# Patient Record
Sex: FEMALE | Race: BLACK OR AFRICAN AMERICAN | NOT HISPANIC OR LATINO | URBAN - METROPOLITAN AREA
[De-identification: names, ages, dates, MRNs, and addresses within clinical notes are randomized per-mention and may not be internally consistent; named-entity substitution may affect disease eponyms.]

---

## 2017-02-15 ENCOUNTER — EMERGENCY (EMERGENCY)
Facility: HOSPITAL | Age: 52
LOS: 1 days | Discharge: HOME | End: 2017-02-15
Attending: EMERGENCY MEDICINE | Admitting: EMERGENCY MEDICINE
Payer: MEDICAID

## 2017-02-15 VITALS
HEART RATE: 90 BPM | RESPIRATION RATE: 20 BRPM | TEMPERATURE: 99 F | HEIGHT: 61 IN | OXYGEN SATURATION: 100 % | DIASTOLIC BLOOD PRESSURE: 89 MMHG | SYSTOLIC BLOOD PRESSURE: 123 MMHG | WEIGHT: 240.08 LBS

## 2017-02-15 DIAGNOSIS — H10.9 UNSPECIFIED CONJUNCTIVITIS: ICD-10-CM

## 2017-02-15 DIAGNOSIS — H57.8 OTHER SPECIFIED DISORDERS OF EYE AND ADNEXA: ICD-10-CM

## 2017-02-15 PROCEDURE — 99283 EMERGENCY DEPT VISIT LOW MDM: CPT

## 2017-02-16 PROBLEM — Z00.00 ENCOUNTER FOR PREVENTIVE HEALTH EXAMINATION: Status: ACTIVE | Noted: 2017-02-16

## 2017-02-16 RX ORDER — POLYMYXIN B SULF/TRIMETHOPRIM 10000-1/ML
1 DROPS OPHTHALMIC (EYE)
Qty: 1 | Refills: 0
Start: 2017-02-16 | End: 2017-02-23

## 2017-02-16 RX ORDER — POLYMYXIN B SULF/TRIMETHOPRIM 10000-1/ML
1 DROPS OPHTHALMIC (EYE) ONCE
Qty: 0 | Refills: 0 | Status: COMPLETED | OUTPATIENT
Start: 2017-02-16 | End: 2017-02-16

## 2017-02-16 RX ADMIN — Medication 1 DROP(S): at 01:20

## 2017-02-17 NOTE — ED PROVIDER NOTE - OBJECTIVE STATEMENT
52 y/o female complaining of left eye irritation and discharge  that began earlier this am, admits to itchiness to right eye tonight. denies fever/Ha/blurry vision/FB sensation. Denies contact lens use.

## 2017-02-17 NOTE — ED PROVIDER NOTE - ATTENDING CONTRIBUTION TO CARE
51y old with acute eye irritation, and erythema, most likely conjunctivitis, no truma, no fb sensation, no uri, no fever, outpatient follow upwith eye suggested

## 2020-08-14 ENCOUNTER — INPATIENT (INPATIENT)
Facility: HOSPITAL | Age: 55
LOS: 2 days | Discharge: HOME | DRG: 638 | End: 2020-08-17
Attending: INTERNAL MEDICINE | Admitting: HOSPITALIST
Payer: COMMERCIAL

## 2020-08-14 VITALS
OXYGEN SATURATION: 98 % | HEART RATE: 91 BPM | TEMPERATURE: 98 F | SYSTOLIC BLOOD PRESSURE: 140 MMHG | RESPIRATION RATE: 18 BRPM | WEIGHT: 250 LBS | DIASTOLIC BLOOD PRESSURE: 85 MMHG | HEIGHT: 61 IN

## 2020-08-14 LAB
A1C WITH ESTIMATED AVERAGE GLUCOSE RESULT: 13.5 % — HIGH (ref 4–5.6)
ALBUMIN SERPL ELPH-MCNC: 4.5 G/DL — SIGNIFICANT CHANGE UP (ref 3.3–5.2)
ALP SERPL-CCNC: 113 U/L — SIGNIFICANT CHANGE UP (ref 40–120)
ALT FLD-CCNC: 31 U/L — SIGNIFICANT CHANGE UP
ANION GAP SERPL CALC-SCNC: 20 MMOL/L — HIGH (ref 5–17)
APPEARANCE UR: ABNORMAL
AST SERPL-CCNC: 20 U/L — SIGNIFICANT CHANGE UP
BACTERIA # UR AUTO: ABNORMAL
BASE EXCESS BLDV CALC-SCNC: -2.6 MMOL/L — LOW (ref -2–2)
BASOPHILS # BLD AUTO: 0.05 K/UL — SIGNIFICANT CHANGE UP (ref 0–0.2)
BASOPHILS NFR BLD AUTO: 0.7 % — SIGNIFICANT CHANGE UP (ref 0–2)
BILIRUB SERPL-MCNC: 0.6 MG/DL — SIGNIFICANT CHANGE UP (ref 0.4–2)
BILIRUB UR-MCNC: NEGATIVE — SIGNIFICANT CHANGE UP
BUN SERPL-MCNC: 9 MG/DL — SIGNIFICANT CHANGE UP (ref 8–20)
CA-I SERPL-SCNC: 1.1 MMOL/L — LOW (ref 1.15–1.33)
CALCIUM SERPL-MCNC: 9.9 MG/DL — SIGNIFICANT CHANGE UP (ref 8.6–10.2)
CHLORIDE BLDV-SCNC: 93 MMOL/L — LOW (ref 98–107)
CHLORIDE SERPL-SCNC: 87 MMOL/L — LOW (ref 98–107)
CO2 SERPL-SCNC: 22 MMOL/L — SIGNIFICANT CHANGE UP (ref 22–29)
COLOR SPEC: YELLOW — SIGNIFICANT CHANGE UP
COMMENT - URINE: SIGNIFICANT CHANGE UP
CREAT SERPL-MCNC: 1.01 MG/DL — SIGNIFICANT CHANGE UP (ref 0.5–1.3)
DIFF PNL FLD: ABNORMAL
EOSINOPHIL # BLD AUTO: 0.11 K/UL — SIGNIFICANT CHANGE UP (ref 0–0.5)
EOSINOPHIL NFR BLD AUTO: 1.5 % — SIGNIFICANT CHANGE UP (ref 0–6)
EPI CELLS # UR: SIGNIFICANT CHANGE UP
ESTIMATED AVERAGE GLUCOSE: 341 MG/DL — HIGH (ref 68–114)
GAS PNL BLDV: 133 MMOL/L — LOW (ref 135–145)
GAS PNL BLDV: SIGNIFICANT CHANGE UP
GAS PNL BLDV: SIGNIFICANT CHANGE UP
GLUCOSE BLDV-MCNC: 627 MG/DL — CRITICAL HIGH (ref 70–99)
GLUCOSE SERPL-MCNC: 633 MG/DL — CRITICAL HIGH (ref 70–99)
GLUCOSE UR QL: 1000 MG/DL
HCO3 BLDV-SCNC: 22 MMOL/L — SIGNIFICANT CHANGE UP (ref 20–26)
HCT VFR BLD CALC: 43.6 % — SIGNIFICANT CHANGE UP (ref 34.5–45)
HCT VFR BLDA CALC: 47 — SIGNIFICANT CHANGE UP (ref 39–50)
HGB BLD CALC-MCNC: 15.2 G/DL — SIGNIFICANT CHANGE UP (ref 11.5–15.5)
HGB BLD-MCNC: 14.4 G/DL — SIGNIFICANT CHANGE UP (ref 11.5–15.5)
IMM GRANULOCYTES NFR BLD AUTO: 0.1 % — SIGNIFICANT CHANGE UP (ref 0–1.5)
KETONES UR-MCNC: ABNORMAL
LACTATE BLDV-MCNC: 2.6 MMOL/L — HIGH (ref 0.5–2)
LEUKOCYTE ESTERASE UR-ACNC: ABNORMAL
LYMPHOCYTES # BLD AUTO: 2.67 K/UL — SIGNIFICANT CHANGE UP (ref 1–3.3)
LYMPHOCYTES # BLD AUTO: 37.4 % — SIGNIFICANT CHANGE UP (ref 13–44)
MCHC RBC-ENTMCNC: 30.3 PG — SIGNIFICANT CHANGE UP (ref 27–34)
MCHC RBC-ENTMCNC: 33 GM/DL — SIGNIFICANT CHANGE UP (ref 32–36)
MCV RBC AUTO: 91.8 FL — SIGNIFICANT CHANGE UP (ref 80–100)
MONOCYTES # BLD AUTO: 0.62 K/UL — SIGNIFICANT CHANGE UP (ref 0–0.9)
MONOCYTES NFR BLD AUTO: 8.7 % — SIGNIFICANT CHANGE UP (ref 2–14)
NEUTROPHILS # BLD AUTO: 3.68 K/UL — SIGNIFICANT CHANGE UP (ref 1.8–7.4)
NEUTROPHILS NFR BLD AUTO: 51.6 % — SIGNIFICANT CHANGE UP (ref 43–77)
NITRITE UR-MCNC: NEGATIVE — SIGNIFICANT CHANGE UP
OTHER CELLS CSF MANUAL: 15 ML/DL — LOW (ref 18–22)
PCO2 BLDV: 50 MMHG — SIGNIFICANT CHANGE UP (ref 35–50)
PH BLDV: 7.3 — LOW (ref 7.32–7.43)
PH UR: 6 — SIGNIFICANT CHANGE UP (ref 5–8)
PLATELET # BLD AUTO: 280 K/UL — SIGNIFICANT CHANGE UP (ref 150–400)
PO2 BLDV: 39 MMHG — SIGNIFICANT CHANGE UP (ref 25–45)
POTASSIUM BLDV-SCNC: 4.4 MMOL/L — SIGNIFICANT CHANGE UP (ref 3.4–4.5)
POTASSIUM SERPL-MCNC: 3.9 MMOL/L — SIGNIFICANT CHANGE UP (ref 3.5–5.3)
POTASSIUM SERPL-SCNC: 3.9 MMOL/L — SIGNIFICANT CHANGE UP (ref 3.5–5.3)
PROT SERPL-MCNC: 7.7 G/DL — SIGNIFICANT CHANGE UP (ref 6.6–8.7)
PROT UR-MCNC: 30 MG/DL
RBC # BLD: 4.75 M/UL — SIGNIFICANT CHANGE UP (ref 3.8–5.2)
RBC # FLD: 13.2 % — SIGNIFICANT CHANGE UP (ref 10.3–14.5)
RBC CASTS # UR COMP ASSIST: ABNORMAL /HPF (ref 0–4)
SAO2 % BLDV: 70 % — SIGNIFICANT CHANGE UP
SODIUM SERPL-SCNC: 129 MMOL/L — LOW (ref 135–145)
SP GR SPEC: 1.01 — SIGNIFICANT CHANGE UP (ref 1.01–1.02)
UROBILINOGEN FLD QL: NEGATIVE MG/DL — SIGNIFICANT CHANGE UP
WBC # BLD: 7.14 K/UL — SIGNIFICANT CHANGE UP (ref 3.8–10.5)
WBC # FLD AUTO: 7.14 K/UL — SIGNIFICANT CHANGE UP (ref 3.8–10.5)
WBC UR QL: ABNORMAL

## 2020-08-14 PROCEDURE — 99285 EMERGENCY DEPT VISIT HI MDM: CPT

## 2020-08-14 RX ORDER — FLUCONAZOLE 150 MG/1
150 TABLET ORAL ONCE
Refills: 0 | Status: COMPLETED | OUTPATIENT
Start: 2020-08-14 | End: 2020-08-14

## 2020-08-14 RX ORDER — INSULIN LISPRO 100/ML
15 VIAL (ML) SUBCUTANEOUS ONCE
Refills: 0 | Status: COMPLETED | OUTPATIENT
Start: 2020-08-14 | End: 2020-08-14

## 2020-08-14 RX ORDER — SODIUM CHLORIDE 9 MG/ML
1000 INJECTION, SOLUTION INTRAVENOUS ONCE
Refills: 0 | Status: COMPLETED | OUTPATIENT
Start: 2020-08-14 | End: 2020-08-14

## 2020-08-14 RX ADMIN — SODIUM CHLORIDE 1000 MILLILITER(S): 9 INJECTION, SOLUTION INTRAVENOUS at 21:58

## 2020-08-14 RX ADMIN — Medication 15 UNIT(S): at 23:02

## 2020-08-14 RX ADMIN — SODIUM CHLORIDE 1000 MILLILITER(S): 9 INJECTION, SOLUTION INTRAVENOUS at 23:00

## 2020-08-14 RX ADMIN — FLUCONAZOLE 150 MILLIGRAM(S): 150 TABLET ORAL at 22:13

## 2020-08-14 RX ADMIN — SODIUM CHLORIDE 1000 MILLILITER(S): 9 INJECTION, SOLUTION INTRAVENOUS at 23:01

## 2020-08-14 NOTE — ED ADULT NURSE NOTE - CHIEF COMPLAINT QUOTE
patient sent from  for "HI" glucose level, states feeling tired UA levels off denies N/V/HA/CP, states going to bathroom every hour. accucheck in ER HIGH

## 2020-08-14 NOTE — ED PROVIDER NOTE - PROGRESS NOTE DETAILS
Angelo: I rechecked the patient and updated her on plan of care. Angelo: I received lab critical result from patient's RN. Glucose 627 and lactate 2.4. IVF are running. UA with moderate ketones, 1000+ glucose. Awaiting VBG. Angelo: I received lab critical result from patient's RN. lactate 2.4. IVF are running. UA with moderate ketones, 1000+ glucose. Awaiting VBG. Angelo: I received lab critical result. glucose 633. Angelo. I rechecked the patient. She was updated on of care. Humalog 15 IV and 2nd liter of LR ordered. Labs (BMP, Mg, P, VBG) will be rechecked at 0230. Angelo: I rechecked the patient. She was updated on plan of care. Has received 2 units of LR now. Lab recheck at 0230. Angelo: I rechecked the patient. She feels like she is voiding less frequently. Last BG of 430. Angelo: I rechecked the patient and updated her on plan for COVID test + admission.

## 2020-08-14 NOTE — ED PROVIDER NOTE - OBJECTIVE STATEMENT
Ms. Isidro is a 54 year old female with history of obesity who presents with urinary frequency. For the last 1 week the patient has had urinary frequency. She voids once every 2 hours during the day and once every hour at night. She reports weight loss and polydipsia. Also notes "a little" blood in her urine (states this is chronic issue for her), new thick white vaginal discharge, and new vaginal pruritis. Presented to Urgent Care today where glucose was noted to be over 500 and was instructed to come to ED. Of note patient used an OTC diuretic for weight loss/bloating for most of July as well as laso tea from about 7/21-8/7. Denies fevers, chills, chest pain, shortness of breath, chest pain, abdominal pain, leg swelling, rashes.  Family hx significant for mother, sister, and brother all with DM2.

## 2020-08-14 NOTE — ED PROVIDER NOTE - NS ED ROS FT
Constitutional: +polydipsia, +weight loss, no fever, sweats, and no chills.  Eyes: no pain, no redness, and no visual changes.  ENMT: no ear pain and no hearing problems, no nasal congestion/drainage, no dysphagia, and no throat pain, no neck pain, no stiffness  CV: no chest pain, no edema.  Resp: no cough, no dyspnea  GI: no abdominal pain, no bloating, no constipation, no diarrhea, no nausea and no vomiting.  : +frequency, no dysuria, +hematuria, +increased vaginal discharge, +vaginal pruritis   MSK: no msk pain, no weakness  Skin: no lesions, and no rashes.  Neuro: no LOC, no headache, no sensory deficits, and no weakness.

## 2020-08-14 NOTE — ED ADULT NURSE NOTE - ED STAT RN HANDOFF DETAILS
Pt handed off to RN NG in stable condition. Pt A+Ox 4. No apparent distress noted at this time. Receiving RN without any questions or concerns at time of handoff.

## 2020-08-14 NOTE — ED ADULT TRIAGE NOTE - CHIEF COMPLAINT QUOTE
patient sent from  for "HI" glucose level, states feeling tired UA levels off denies N/V/HA/CP, states going to bathroom every hour patient sent from  for "HI" glucose level, states feeling tired UA levels off denies N/V/HA/CP, states going to bathroom every hour. accucheck in ER HIGH

## 2020-08-14 NOTE — ED PROVIDER NOTE - CLINICAL SUMMARY MEDICAL DECISION MAKING FREE TEXT BOX
Ms. Isidro is a 54 year old female with history of obesity who presents with urinary frequency, weight loss, and polydipsia. Labs showed glucose of 633, anion gap of 20, and A1C of 13.5. UA with moderate ketones and glycosuria. Also reported symptoms consistent with vulvovaginal candidiasis, given fluconazole 150 mg PO x1. Patient given 2 liters of LR and humalog 15 units IV with improvement in gap to 17. She will be admitted to medicine for further care and education on diabetes management.

## 2020-08-14 NOTE — ED PROVIDER NOTE - ATTENDING CONTRIBUTION TO CARE
Pt new onset diabetic with A1c >13, symptomatic hyperglycemia with mild ketosis, polyuria, polydipsia. Mild elevation of AG closed with single dose of humalog, FS downtrending to 200s. Given high A1c will likely need continued insulin treatment. Admitted to medicine for glycemic control and medical optimization.

## 2020-08-14 NOTE — ED PROVIDER NOTE - PHYSICAL EXAMINATION
General: well appearing, NAD  Head:  NC, AT  Eyes: EOMI,  no scleral icterus  Ears: no erythema/drainage  Nose: midline, no bleeding/drainage  Throat: MMM  Cardiac: RRR, no m/r/g, no lower extremity edema  Respiratory: CTA anteriorly, no wheezes/rales/rhonchi, equal chest wall expansions  Abdomen: soft, obese, ND, NT, no rebound tenderness, no guarding, nonperitonitic  MSK/Vascular: full ROM, distal pulses intact, soft compartments, warm extremities  Neuro: AAOx3, strength 5/5, sensation to light touch intact  Psych: calm, cooperative, normal affect

## 2020-08-14 NOTE — ED ADULT NURSE NOTE - OBJECTIVE STATEMENT
Pt went to urgent care today for urinary frequency. Pt sent to ED for high blood sugar. On arrival BG >530.

## 2020-08-15 DIAGNOSIS — Z98.890 OTHER SPECIFIED POSTPROCEDURAL STATES: Chronic | ICD-10-CM

## 2020-08-15 DIAGNOSIS — E11.10 TYPE 2 DIABETES MELLITUS WITH KETOACIDOSIS WITHOUT COMA: ICD-10-CM

## 2020-08-15 LAB
ANION GAP SERPL CALC-SCNC: 16 MMOL/L — SIGNIFICANT CHANGE UP (ref 5–17)
ANION GAP SERPL CALC-SCNC: 17 MMOL/L — SIGNIFICANT CHANGE UP (ref 5–17)
BASE EXCESS BLDV CALC-SCNC: -1.3 MMOL/L — SIGNIFICANT CHANGE UP (ref -2–2)
BUN SERPL-MCNC: 6 MG/DL — LOW (ref 8–20)
BUN SERPL-MCNC: 7 MG/DL — LOW (ref 8–20)
CA-I SERPL-SCNC: 1.13 MMOL/L — LOW (ref 1.15–1.33)
CALCIUM SERPL-MCNC: 9.2 MG/DL — SIGNIFICANT CHANGE UP (ref 8.6–10.2)
CALCIUM SERPL-MCNC: 9.3 MG/DL — SIGNIFICANT CHANGE UP (ref 8.6–10.2)
CHLORIDE BLDV-SCNC: 99 MMOL/L — SIGNIFICANT CHANGE UP (ref 98–107)
CHLORIDE SERPL-SCNC: 93 MMOL/L — LOW (ref 98–107)
CHLORIDE SERPL-SCNC: 95 MMOL/L — LOW (ref 98–107)
CO2 SERPL-SCNC: 21 MMOL/L — LOW (ref 22–29)
CO2 SERPL-SCNC: 25 MMOL/L — SIGNIFICANT CHANGE UP (ref 22–29)
CREAT SERPL-MCNC: 0.81 MG/DL — SIGNIFICANT CHANGE UP (ref 0.5–1.3)
CREAT SERPL-MCNC: 0.84 MG/DL — SIGNIFICANT CHANGE UP (ref 0.5–1.3)
GAS PNL BLDV: 137 MMOL/L — SIGNIFICANT CHANGE UP (ref 135–145)
GAS PNL BLDV: SIGNIFICANT CHANGE UP
GAS PNL BLDV: SIGNIFICANT CHANGE UP
GLUCOSE BLDC GLUCOMTR-MCNC: 202 MG/DL — HIGH (ref 70–99)
GLUCOSE BLDC GLUCOMTR-MCNC: 268 MG/DL — HIGH (ref 70–99)
GLUCOSE BLDC GLUCOMTR-MCNC: 273 MG/DL — HIGH (ref 70–99)
GLUCOSE BLDC GLUCOMTR-MCNC: 273 MG/DL — HIGH (ref 70–99)
GLUCOSE BLDC GLUCOMTR-MCNC: 300 MG/DL — HIGH (ref 70–99)
GLUCOSE BLDV-MCNC: 365 MG/DL — HIGH (ref 70–99)
GLUCOSE SERPL-MCNC: 281 MG/DL — HIGH (ref 70–99)
GLUCOSE SERPL-MCNC: 362 MG/DL — HIGH (ref 70–99)
HCO3 BLDV-SCNC: 23 MMOL/L — SIGNIFICANT CHANGE UP (ref 20–26)
HCT VFR BLDA CALC: 45 — SIGNIFICANT CHANGE UP (ref 39–50)
HGB BLD CALC-MCNC: 14.8 G/DL — SIGNIFICANT CHANGE UP (ref 11.5–15.5)
LACTATE BLDV-MCNC: 2 MMOL/L — SIGNIFICANT CHANGE UP (ref 0.5–2)
MAGNESIUM SERPL-MCNC: 1.8 MG/DL — SIGNIFICANT CHANGE UP (ref 1.6–2.6)
OTHER CELLS CSF MANUAL: 15 ML/DL — LOW (ref 18–22)
PCO2 BLDV: 47 MMHG — SIGNIFICANT CHANGE UP (ref 35–50)
PH BLDV: 7.34 — SIGNIFICANT CHANGE UP (ref 7.32–7.43)
PHOSPHATE SERPL-MCNC: 2.5 MG/DL — SIGNIFICANT CHANGE UP (ref 2.4–4.7)
PO2 BLDV: 40 MMHG — SIGNIFICANT CHANGE UP (ref 25–45)
POTASSIUM BLDV-SCNC: 3.5 MMOL/L — SIGNIFICANT CHANGE UP (ref 3.4–4.5)
POTASSIUM SERPL-MCNC: 3.3 MMOL/L — LOW (ref 3.5–5.3)
POTASSIUM SERPL-MCNC: 3.5 MMOL/L — SIGNIFICANT CHANGE UP (ref 3.5–5.3)
POTASSIUM SERPL-SCNC: 3.3 MMOL/L — LOW (ref 3.5–5.3)
POTASSIUM SERPL-SCNC: 3.5 MMOL/L — SIGNIFICANT CHANGE UP (ref 3.5–5.3)
SAO2 % BLDV: 74 % — SIGNIFICANT CHANGE UP
SARS-COV-2 RNA SPEC QL NAA+PROBE: SIGNIFICANT CHANGE UP
SODIUM SERPL-SCNC: 131 MMOL/L — LOW (ref 135–145)
SODIUM SERPL-SCNC: 136 MMOL/L — SIGNIFICANT CHANGE UP (ref 135–145)

## 2020-08-15 PROCEDURE — 99223 1ST HOSP IP/OBS HIGH 75: CPT

## 2020-08-15 PROCEDURE — 99222 1ST HOSP IP/OBS MODERATE 55: CPT

## 2020-08-15 PROCEDURE — 12345: CPT | Mod: NC

## 2020-08-15 RX ORDER — INSULIN LISPRO 100/ML
VIAL (ML) SUBCUTANEOUS
Refills: 0 | Status: DISCONTINUED | OUTPATIENT
Start: 2020-08-15 | End: 2020-08-17

## 2020-08-15 RX ORDER — INSULIN LISPRO 100/ML
VIAL (ML) SUBCUTANEOUS AT BEDTIME
Refills: 0 | Status: DISCONTINUED | OUTPATIENT
Start: 2020-08-15 | End: 2020-08-17

## 2020-08-15 RX ORDER — ACETAMINOPHEN 500 MG
650 TABLET ORAL ONCE
Refills: 0 | Status: COMPLETED | OUTPATIENT
Start: 2020-08-15 | End: 2020-08-15

## 2020-08-15 RX ORDER — ENOXAPARIN SODIUM 100 MG/ML
40 INJECTION SUBCUTANEOUS DAILY
Refills: 0 | Status: DISCONTINUED | OUTPATIENT
Start: 2020-08-15 | End: 2020-08-17

## 2020-08-15 RX ORDER — SODIUM CHLORIDE 9 MG/ML
1000 INJECTION, SOLUTION INTRAVENOUS
Refills: 0 | Status: DISCONTINUED | OUTPATIENT
Start: 2020-08-15 | End: 2020-08-16

## 2020-08-15 RX ORDER — DEXTROSE 50 % IN WATER 50 %
15 SYRINGE (ML) INTRAVENOUS ONCE
Refills: 0 | Status: DISCONTINUED | OUTPATIENT
Start: 2020-08-15 | End: 2020-08-17

## 2020-08-15 RX ORDER — DEXTROSE 50 % IN WATER 50 %
12.5 SYRINGE (ML) INTRAVENOUS ONCE
Refills: 0 | Status: DISCONTINUED | OUTPATIENT
Start: 2020-08-15 | End: 2020-08-17

## 2020-08-15 RX ORDER — INSULIN GLARGINE 100 [IU]/ML
20 INJECTION, SOLUTION SUBCUTANEOUS EVERY MORNING
Refills: 0 | Status: DISCONTINUED | OUTPATIENT
Start: 2020-08-15 | End: 2020-08-16

## 2020-08-15 RX ORDER — DEXTROSE 50 % IN WATER 50 %
25 SYRINGE (ML) INTRAVENOUS ONCE
Refills: 0 | Status: DISCONTINUED | OUTPATIENT
Start: 2020-08-15 | End: 2020-08-17

## 2020-08-15 RX ORDER — GLUCAGON INJECTION, SOLUTION 0.5 MG/.1ML
1 INJECTION, SOLUTION SUBCUTANEOUS ONCE
Refills: 0 | Status: DISCONTINUED | OUTPATIENT
Start: 2020-08-15 | End: 2020-08-17

## 2020-08-15 RX ORDER — NITROFURANTOIN MACROCRYSTAL 50 MG
100 CAPSULE ORAL
Refills: 0 | Status: DISCONTINUED | OUTPATIENT
Start: 2020-08-15 | End: 2020-08-17

## 2020-08-15 RX ORDER — SODIUM CHLORIDE 9 MG/ML
1000 INJECTION, SOLUTION INTRAVENOUS
Refills: 0 | Status: DISCONTINUED | OUTPATIENT
Start: 2020-08-15 | End: 2020-08-17

## 2020-08-15 RX ADMIN — Medication 100 MILLIGRAM(S): at 16:34

## 2020-08-15 RX ADMIN — SODIUM CHLORIDE 100 MILLILITER(S): 9 INJECTION, SOLUTION INTRAVENOUS at 20:32

## 2020-08-15 RX ADMIN — Medication 1: at 21:17

## 2020-08-15 RX ADMIN — SODIUM CHLORIDE 1000 MILLILITER(S): 9 INJECTION, SOLUTION INTRAVENOUS at 00:00

## 2020-08-15 RX ADMIN — Medication 650 MILLIGRAM(S): at 21:59

## 2020-08-15 RX ADMIN — ENOXAPARIN SODIUM 40 MILLIGRAM(S): 100 INJECTION SUBCUTANEOUS at 16:32

## 2020-08-15 RX ADMIN — SODIUM CHLORIDE 100 MILLILITER(S): 9 INJECTION, SOLUTION INTRAVENOUS at 08:41

## 2020-08-15 RX ADMIN — Medication 4: at 16:29

## 2020-08-15 RX ADMIN — Medication 6: at 08:41

## 2020-08-15 RX ADMIN — Medication 100 MILLIGRAM(S): at 04:57

## 2020-08-15 RX ADMIN — INSULIN GLARGINE 20 UNIT(S): 100 INJECTION, SOLUTION SUBCUTANEOUS at 08:42

## 2020-08-15 RX ADMIN — Medication 6: at 11:54

## 2020-08-15 NOTE — CONSULT NOTE ADULT - SUBJECTIVE AND OBJECTIVE BOX
HPI:  54yoF hx obesity but no other PMHx who presents to [facility] ED after being sent from urgent care for hyperglycemia.  Pt reports 1 week hx of polyuria every 1-2 hours, associated with dysuria and mild hematuria.  She has been drinking water more than usual and lost about 20 lbs in teofilo last 3 mos.  She also had increased vaginal discharge associated with pruritus.  She went to urgent care due to above symptoms, her labs were checked which showed glucose >500 and pt was advised to go to ER. Denies any prior diagnosis of DM.  Reports hx of DM in multiple family members including mother, brother and sister.  On admission glucose 633, AG of 20, HgbA1c 13, pH 7.30 on VBG. UA positive.  In ED, received Humalog 15U x 1 with improvement of glucose to 362, resolution of AG and normalization of pH on VBG.     PAST MEDICAL & SURGICAL HISTORY:  Obesity  H/O knee surgery    FAMILY HISTORY:  Family history of diabetes mellitus: mother, brother, sister    SOCIAL HISTORY: no tobacco, no etoh, no drugs, 5 children     REVIEW OF SYSTEMS:  Constitutional: No fever, no chills, lost 20 lbs   Eyes: No eye swelling,no  blurry vision, no redness, no loss of vision.  Neck: No neck pain, no change in voice.  Lungs: No shortness of breath, no wheezing, no cough  CV: No chest pain, no palpitations  GI: No nausea, no vomiting, no constipation, no diarrhea, no abdominal pain  : polyuria, hematuria, dysuria   Musculoskeletal: No muscle pain, no joint pain, no swelling.  Skin: No rash, no infections.  Neurologic: No headaches, no weakness, no burning or pain in feet, no tremor.  Endocrine: No heat intolerance, no cold intolerance  Psych: No depression, no anxiety    MEDICATIONS  (STANDING):  dextrose 5%. 1000 milliLiter(s) (50 mL/Hr) IV Continuous <Continuous>  dextrose 50% Injectable 12.5 Gram(s) IV Push once  dextrose 50% Injectable 25 Gram(s) IV Push once  dextrose 50% Injectable 25 Gram(s) IV Push once  enoxaparin Injectable 40 milliGRAM(s) SubCutaneous daily  insulin glargine Injectable (LANTUS) 20 Unit(s) SubCutaneous every morning  insulin lispro (HumaLOG) corrective regimen sliding scale   SubCutaneous three times a day before meals  insulin lispro (HumaLOG) corrective regimen sliding scale   SubCutaneous at bedtime  lactated ringers. 1000 milliLiter(s) (100 mL/Hr) IV Continuous <Continuous>  nitrofurantoin monohydrate/macrocrystals (MACROBID) 100 milliGRAM(s) Oral two times a day with meals    MEDICATIONS  (PRN):  dextrose 40% Gel 15 Gram(s) Oral once PRN Blood Glucose LESS THAN 70 milliGRAM(s)/deciliter  glucagon  Injectable 1 milliGRAM(s) IntraMuscular once PRN Glucose LESS THAN 70 milligrams/deciliter    Allergies  No Known Allergies    CAPILLARY BLOOD GLUCOSE  POCT Blood Glucose.: 268 mg/dL (15 Aug 2020 11:51)      PHYSICAL EXAM:    Vital Signs Last 24 Hrs  T(C): 36.6 (15 Aug 2020 08:46), Max: 37 (15 Aug 2020 05:45)  T(F): 97.8 (15 Aug 2020 08:46), Max: 98.6 (15 Aug 2020 05:45)  HR: 72 (15 Aug 2020 08:46) (72 - 91)  BP: 116/83 (15 Aug 2020 08:46) (116/83 - 140/85)  BP(mean): --  RR: 18 (15 Aug 2020 08:46) (18 - 18)  SpO2: 98% (15 Aug 2020 08:46) (96% - 98%)    General appearance: Well developed, well nourished,obese   Eyes: Pupils equal and reactive to light. EOM full. No exophthalmos.  Neck: Trachea midline. No thyroid enlargement.  Lungs: Normal respiratory excursion. Lungs clear.  CV: Regular cardiac rhythm. No murmur. Carotid and pedal pulses intact.  Abdomen: Soft, non tender, no organomegaly or mass.  Musculoskeletal: No cyanosis, clubbing, or edema. No pedal lesions.  Skin: Warm and moist. No rash.   Neuro: Cranial nerves intact. Normal motor and sensory function.   Psych: Normal affect, good judgement.    LABS:                        14.4   7.14  )-----------( 280      ( 14 Aug 2020 22:03 )             43.6     08-15    131<L>  |  93<L>  |  7.0<L>  ----------------------------<  362<H>  3.5   |  21.0<L>  |  0.84    Ca    9.3      15 Aug 2020 02:16  Phos  2.5     08-15  Mg     1.8     08-15    TPro  7.7  /  Alb  4.5  /  TBili  0.6  /  DBili  x   /  AST  20  /  ALT  31  /  AlkPhos  113  08-14    Urinalysis Basic - ( 14 Aug 2020 22:04 )    Color: Yellow / Appearance: Slightly Turbid / S.010 / pH: x  Gluc: x / Ketone: Moderate  / Bili: Negative / Urobili: Negative mg/dL   Blood: x / Protein: 30 mg/dL / Nitrite: Negative   Leuk Esterase: Moderate / RBC: 11-25 /HPF / WBC 11-25   Sq Epi: x / Non Sq Epi: Few / Bacteria: Occasional      LIVER FUNCTIONS - ( 14 Aug 2020 22:03 )  Alb: 4.5 g/dL / Pro: 7.7 g/dL / ALK PHOS: 113 U/L / ALT: 31 U/L / AST: 20 U/L / GGT: x

## 2020-08-15 NOTE — CONSULT NOTE ADULT - ASSESSMENT
54yoF hx obesity, strong FHx of DM in multiple family members, who presented to urgent care with 1 week hx of urinary frequency, dysuria, hematuria with vaginal discharge and pruritis and was sent to ED for having glucose >500; on admission, pt with serum glucose 633, AG 20, HgbA1c 13, being admitted for new onset diabetes mellitus    New onset, uncontrolled diabetes mellitus with hyperglycemia and mild DKA   - gap closed, bicarbonate still low.   - HgbA1c 13  - continue iv fluid generously   - started insulin based on 0.2units/kg   - she got lantus 20 u x 1 today   - monitor Bgs q 2 hr   - use SSI moderate intensity sliding scale  - trend cmp q 4 hr to ensure AG remains closed and bicarbonate recovered.   - she will need CDE   - she needs to followup with endocrine as outpatient and continue CDE followups after discharge   - she will need insulin upon discharge basal and bolus TID for meals.     Hyponatremia  -Pseudohyponatremia from hyperglycemia.Will correct with fluid replenished . TRend CMP     Acute cystitis: started on macrobid  x 5 days    Vaginitis   -Pt with likely candida vulvo-vaginitis from uncontrolled DM  -Received dilfucan x 1 in ED

## 2020-08-15 NOTE — H&P ADULT - ATTENDING COMMENTS
Pt with several questions about new diagnosis and management, explained new uncontrolled DM2 which requires insulin at this stage, also explained about cardiovascular, neurological, renal, ophthalmological associated complications and need for close outpatient follow up with PMD, endocrinologist and eye doctor.

## 2020-08-15 NOTE — H&P ADULT - NSHPLABSRESULTS_GEN_ALL_CORE
14.4   7.14  )-----------( 280      ( 14 Aug 2020 22:03 )             43.6       08-15    131<L>  |  93<L>  |  7.0<L>  ----------------------------<  362<H>  3.5   |  21.0<L>  |  0.84    Ca    9.3      15 Aug 2020 02:16  Phos  2.5     08-15  Mg     1.8     08-15    TPro  7.7  /  Alb  4.5  /  TBili  0.6  /  DBili  x   /  AST  20  /  ALT  31  /  AlkPhos  113  08-14

## 2020-08-15 NOTE — H&P ADULT - NSHPPHYSICALEXAM_GEN_ALL_CORE
Vital Signs Last 24 Hrs  T(C): 36.9 (15 Aug 2020 02:13), Max: 36.9 (15 Aug 2020 02:13)  T(F): 98.5 (15 Aug 2020 02:13), Max: 98.5 (15 Aug 2020 02:13)  HR: 86 (15 Aug 2020 02:13) (86 - 91)  BP: 138/78 (15 Aug 2020 02:13) (138/78 - 140/85)  BP(mean): --  RR: 18 (15 Aug 2020 02:13) (18 - 18)  SpO2: 96% (15 Aug 2020 02:13) (96% - 98%)    GENERAL:  Well-appearing obese, not in acute distress  EYES:  Clear conjunctiva, extraocular movement intact  ENT: Moist mucous membranes, no thrush   RESP:  Non-labored breathing pattern, lungs clear to ausculation in anterior fields  CV: Regular rate and rhythm, no murmurs appreciated, no lower extremity edema  GI: Soft, non-tender, non-distended  NEURO: Awake, alert, conversant, upper and lower extremity strength 5/5, light touch sensation grossly intact  PSYCH: Calm, cooperative  SKIN: No rash or lesions, warm and dry

## 2020-08-15 NOTE — H&P ADULT - HISTORY OF PRESENT ILLNESS
54yoF hx obesity but no other PMHx who presents to [facility] ED after being sent from urgent care for hyperglycemia.  Pt reports 1 week hx of urinary frequency with voiding almost every 1-2 hours, associated with dysuria and mild hematuria.  She has been drinking water more than usual as well but thought she was dehydrated as she had recently been using supplements (Xpel diuretic and then Iaso tea) for weight loss from June of this year up until about 2 weeks.  She also had increased vaginal discharge associated with pruritus.  She went to urgent care due to above symptoms, her labs were checked which showed glucose >500 and pt was advised to go to ER.   Pt has a PMD, reports having physical last August in which she had ‘normal labs’.  Denies any prior diagnosis of DM.  Reports hx of DM in multiple family members including mother, brother and sister.  On admission, FS >530, labs notable for serums glucose 633, AG of 20, HgbA1c 13, pH 7.30 on VBG. UA positive.  In ED, received Humalog 15U x 1 with improvement of glucose to 362, resolution of AG and normalization of pH on VBG.

## 2020-08-15 NOTE — H&P ADULT - ASSESSMENT
54yoF hx obesity, strong FHx of DM in multiple family members, who presented to urgent care with 1 week hx of urinary frequency, dysuria, hematuria with vaginal discharge and pruritis and was sent to ED for having glucose >500; on admission, pt with serum glucose 633, AG 20, HgbA1c 13, being admitted for new onset diabetes mellitus    New onset, uncontrolled diabetes mellitus with hyperglycemia  -s/p Humalog 15 U in ED with improvement in glucose  -Will start insulin based on 0.2units/kg which equals about 22.6U, will start lantus 20U for now  -Moderate intensity sliding scale  -Endocrine consult  -Pt inquiring about 'specific meal plans’ for weight loss, nutrition consulted  -Pt will likely require insulin at time of discharge and will need diabetes teaching    Acute cystitis  -Pt with urinary symptoms, +UA, no systemic signs of infection  -Start macrobid x 5 days    Vaginitis   -Pt with likely candida vulvo-vaginitis from uncontrolled DM  -Received dilfucan x 1 in ED    Hyponatremia  -Pseudohyponatremia from hyperglycemia    Prophylactic measure  -Lovenox 54yoF hx obesity, strong FHx of DM in multiple family members, who presented to urgent care with 1 week hx of urinary frequency, dysuria, hematuria with vaginal discharge and pruritis and was sent to ED for having glucose >500; on admission, pt with serum glucose 633, AG 20, HgbA1c 13, being admitted for new onset diabetes mellitus    New onset, uncontrolled diabetes mellitus with hyperglycemia and mild DKA  -Pt with significant hyperglycemia mild AGMA and acidemia and ketonuria and  -s/p Humalog 15 U in ED with improvement in glucose, resolution of AMGA and acidemia  -Will start insulin based on 0.2units/kg which equals about 22.6U, will start lantus 20U for now  -Moderate intensity sliding scale  -Endocrine consult  -Trend BMP to ensure AG remains closed  -Pt inquiring about 'specific meal plans’ for weight loss, nutrition consulted  -Pt will likely require insulin at time of discharge and will need diabetes teaching    Acute cystitis  -Pt with urinary symptoms, +UA, no systemic signs of infection  -Start macrobid x 5 days    Vaginitis   -Pt with likely candida vulvo-vaginitis from uncontrolled DM  -Received dilfucan x 1 in ED    Hyponatremia  -Pseudohyponatremia from hyperglycemia    Prophylactic measure  -Lovenox 54yoF hx obesity, strong FHx of DM in multiple family members, who presented to urgent care with 1 week hx of urinary frequency, dysuria, hematuria with vaginal discharge and pruritis and was sent to ED for having glucose >500; on admission, pt with serum glucose 633, AG 20, HgbA1c 13, being admitted for new onset diabetes mellitus    New onset, uncontrolled diabetes mellitus with hyperglycemia and mild DKA  -Pt with significant hyperglycemia, mild AGMA and acidemia, ketonuria, HgbA1c 13  -s/p Humalog 15 U in ED with improvement in glucose, resolution of AMGA and acidemia  -Will start insulin based on 0.2units/kg which equals about 22.6U, will start lantus 20U for now  -Moderate intensity sliding scale  -Endocrine consult  -Trend BMP to ensure AG remains closed  -Pt inquiring about 'specific meal plans’ for weight loss, nutrition consulted  -Pt will likely require insulin at time of discharge and will need diabetes teaching    Acute cystitis  -Pt with urinary symptoms, +UA, no systemic signs of infection  -Start macrobid x 5 days    Vaginitis   -Pt with likely candida vulvo-vaginitis from uncontrolled DM  -Received dilfucan x 1 in ED    Hyponatremia  -Pseudohyponatremia from hyperglycemia    Prophylactic measure  -Lovenox 54yoF hx obesity, strong FHx of DM in multiple family members, who presented to urgent care with 1 week hx of urinary frequency, dysuria, hematuria with vaginal discharge and pruritis and was sent to ED for having glucose >500; on admission, pt with serum glucose 633, AG 20, HgbA1c 13, being admitted for new onset diabetes mellitus    New onset, uncontrolled diabetes mellitus with hyperglycemia and mild DKA  -Pt with significant hyperglycemia, mild AGMA and acidemia, ketonuria, HgbA1c 13  -s/p 2L IVF and humalog 15 U in ED with improvement in glucose, resolution of AMGA and acidemia  -Will start insulin based on 0.2units/kg which equals about 22.6U, will start lantus 20U for now  -Moderate intensity sliding scale  -Start maintenance IVF  -Endocrine consult  -Trend BMP to ensure AG remains closed  -Pt inquiring about 'specific meal plans’ for weight loss, nutrition consulted  -Pt will likely require insulin at time of discharge and will need diabetes teaching    Acute cystitis  -Pt with urinary symptoms, +UA, no systemic signs of infection  -Start macrobid x 5 days    Vaginitis   -Pt with likely candida vulvo-vaginitis from uncontrolled DM  -Received dilfucan x 1 in ED    Hyponatremia  -Pseudohyponatremia from hyperglycemia    Prophylactic measure  -Lovenox

## 2020-08-16 LAB
ANION GAP SERPL CALC-SCNC: 15 MMOL/L — SIGNIFICANT CHANGE UP (ref 5–17)
ANION GAP SERPL CALC-SCNC: 17 MMOL/L — SIGNIFICANT CHANGE UP (ref 5–17)
BASOPHILS # BLD AUTO: 0.05 K/UL — SIGNIFICANT CHANGE UP (ref 0–0.2)
BASOPHILS NFR BLD AUTO: 0.8 % — SIGNIFICANT CHANGE UP (ref 0–2)
BUN SERPL-MCNC: 7 MG/DL — LOW (ref 8–20)
BUN SERPL-MCNC: 8 MG/DL — SIGNIFICANT CHANGE UP (ref 8–20)
CALCIUM SERPL-MCNC: 8.9 MG/DL — SIGNIFICANT CHANGE UP (ref 8.6–10.2)
CALCIUM SERPL-MCNC: 8.9 MG/DL — SIGNIFICANT CHANGE UP (ref 8.6–10.2)
CHLORIDE SERPL-SCNC: 94 MMOL/L — LOW (ref 98–107)
CHLORIDE SERPL-SCNC: 97 MMOL/L — LOW (ref 98–107)
CO2 SERPL-SCNC: 23 MMOL/L — SIGNIFICANT CHANGE UP (ref 22–29)
CO2 SERPL-SCNC: 26 MMOL/L — SIGNIFICANT CHANGE UP (ref 22–29)
CREAT SERPL-MCNC: 0.68 MG/DL — SIGNIFICANT CHANGE UP (ref 0.5–1.3)
CREAT SERPL-MCNC: 0.7 MG/DL — SIGNIFICANT CHANGE UP (ref 0.5–1.3)
CULTURE RESULTS: SIGNIFICANT CHANGE UP
EOSINOPHIL # BLD AUTO: 0.17 K/UL — SIGNIFICANT CHANGE UP (ref 0–0.5)
EOSINOPHIL NFR BLD AUTO: 2.8 % — SIGNIFICANT CHANGE UP (ref 0–6)
GLUCOSE BLDC GLUCOMTR-MCNC: 296 MG/DL — HIGH (ref 70–99)
GLUCOSE BLDC GLUCOMTR-MCNC: 310 MG/DL — HIGH (ref 70–99)
GLUCOSE BLDC GLUCOMTR-MCNC: 363 MG/DL — HIGH (ref 70–99)
GLUCOSE BLDC GLUCOMTR-MCNC: 387 MG/DL — HIGH (ref 70–99)
GLUCOSE SERPL-MCNC: 280 MG/DL — HIGH (ref 70–99)
GLUCOSE SERPL-MCNC: 292 MG/DL — HIGH (ref 70–99)
HCT VFR BLD CALC: 41.5 % — SIGNIFICANT CHANGE UP (ref 34.5–45)
HCV AB S/CO SERPL IA: 0.11 S/CO — SIGNIFICANT CHANGE UP (ref 0–0.99)
HCV AB SERPL-IMP: SIGNIFICANT CHANGE UP
HGB BLD-MCNC: 13.5 G/DL — SIGNIFICANT CHANGE UP (ref 11.5–15.5)
IMM GRANULOCYTES NFR BLD AUTO: 0.2 % — SIGNIFICANT CHANGE UP (ref 0–1.5)
LYMPHOCYTES # BLD AUTO: 2.57 K/UL — SIGNIFICANT CHANGE UP (ref 1–3.3)
LYMPHOCYTES # BLD AUTO: 42 % — SIGNIFICANT CHANGE UP (ref 13–44)
MCHC RBC-ENTMCNC: 30 PG — SIGNIFICANT CHANGE UP (ref 27–34)
MCHC RBC-ENTMCNC: 32.5 GM/DL — SIGNIFICANT CHANGE UP (ref 32–36)
MCV RBC AUTO: 92.2 FL — SIGNIFICANT CHANGE UP (ref 80–100)
MONOCYTES # BLD AUTO: 0.53 K/UL — SIGNIFICANT CHANGE UP (ref 0–0.9)
MONOCYTES NFR BLD AUTO: 8.7 % — SIGNIFICANT CHANGE UP (ref 2–14)
NEUTROPHILS # BLD AUTO: 2.79 K/UL — SIGNIFICANT CHANGE UP (ref 1.8–7.4)
NEUTROPHILS NFR BLD AUTO: 45.5 % — SIGNIFICANT CHANGE UP (ref 43–77)
PLATELET # BLD AUTO: 249 K/UL — SIGNIFICANT CHANGE UP (ref 150–400)
POTASSIUM SERPL-MCNC: 3.4 MMOL/L — LOW (ref 3.5–5.3)
POTASSIUM SERPL-MCNC: 4 MMOL/L — SIGNIFICANT CHANGE UP (ref 3.5–5.3)
POTASSIUM SERPL-SCNC: 3.4 MMOL/L — LOW (ref 3.5–5.3)
POTASSIUM SERPL-SCNC: 4 MMOL/L — SIGNIFICANT CHANGE UP (ref 3.5–5.3)
PROCALCITONIN SERPL-MCNC: 0.1 NG/ML — SIGNIFICANT CHANGE UP (ref 0.02–0.1)
RBC # BLD: 4.5 M/UL — SIGNIFICANT CHANGE UP (ref 3.8–5.2)
RBC # FLD: 13.5 % — SIGNIFICANT CHANGE UP (ref 10.3–14.5)
SARS-COV-2 IGG SERPL QL IA: NEGATIVE — SIGNIFICANT CHANGE UP
SARS-COV-2 IGM SERPL IA-ACNC: 0.08 INDEX — SIGNIFICANT CHANGE UP
SODIUM SERPL-SCNC: 135 MMOL/L — SIGNIFICANT CHANGE UP (ref 135–145)
SODIUM SERPL-SCNC: 137 MMOL/L — SIGNIFICANT CHANGE UP (ref 135–145)
SPECIMEN SOURCE: SIGNIFICANT CHANGE UP
WBC # BLD: 6.12 K/UL — SIGNIFICANT CHANGE UP (ref 3.8–10.5)
WBC # FLD AUTO: 6.12 K/UL — SIGNIFICANT CHANGE UP (ref 3.8–10.5)

## 2020-08-16 PROCEDURE — 99232 SBSQ HOSP IP/OBS MODERATE 35: CPT

## 2020-08-16 RX ORDER — INSULIN GLARGINE 100 [IU]/ML
25 INJECTION, SOLUTION SUBCUTANEOUS EVERY MORNING
Refills: 0 | Status: DISCONTINUED | OUTPATIENT
Start: 2020-08-16 | End: 2020-08-17

## 2020-08-16 RX ORDER — POTASSIUM CHLORIDE 20 MEQ
40 PACKET (EA) ORAL ONCE
Refills: 0 | Status: COMPLETED | OUTPATIENT
Start: 2020-08-16 | End: 2020-08-16

## 2020-08-16 RX ORDER — BENZOCAINE AND MENTHOL 5; 1 G/100ML; G/100ML
1 LIQUID ORAL
Refills: 0 | Status: DISCONTINUED | OUTPATIENT
Start: 2020-08-16 | End: 2020-08-17

## 2020-08-16 RX ORDER — INSULIN LISPRO 100/ML
5 VIAL (ML) SUBCUTANEOUS
Refills: 0 | Status: DISCONTINUED | OUTPATIENT
Start: 2020-08-16 | End: 2020-08-17

## 2020-08-16 RX ADMIN — Medication 40 MILLIEQUIVALENT(S): at 07:48

## 2020-08-16 RX ADMIN — INSULIN GLARGINE 25 UNIT(S): 100 INJECTION, SOLUTION SUBCUTANEOUS at 09:12

## 2020-08-16 RX ADMIN — Medication 100 MILLIGRAM(S): at 18:05

## 2020-08-16 RX ADMIN — Medication 5 UNIT(S): at 16:59

## 2020-08-16 RX ADMIN — Medication 100 MILLIGRAM(S): at 09:12

## 2020-08-16 RX ADMIN — Medication 3: at 21:45

## 2020-08-16 RX ADMIN — BENZOCAINE AND MENTHOL 1 LOZENGE: 5; 1 LIQUID ORAL at 18:05

## 2020-08-16 RX ADMIN — BENZOCAINE AND MENTHOL 1 LOZENGE: 5; 1 LIQUID ORAL at 21:47

## 2020-08-16 RX ADMIN — Medication 8: at 12:06

## 2020-08-16 RX ADMIN — Medication 10: at 16:59

## 2020-08-16 RX ADMIN — Medication 6: at 07:43

## 2020-08-17 ENCOUNTER — TRANSCRIPTION ENCOUNTER (OUTPATIENT)
Age: 55
End: 2020-08-17

## 2020-08-17 VITALS
DIASTOLIC BLOOD PRESSURE: 68 MMHG | HEART RATE: 82 BPM | TEMPERATURE: 98 F | SYSTOLIC BLOOD PRESSURE: 124 MMHG | RESPIRATION RATE: 18 BRPM | OXYGEN SATURATION: 96 %

## 2020-08-17 LAB
ANION GAP SERPL CALC-SCNC: 14 MMOL/L — SIGNIFICANT CHANGE UP (ref 5–17)
BASOPHILS # BLD AUTO: 0.03 K/UL — SIGNIFICANT CHANGE UP (ref 0–0.2)
BASOPHILS NFR BLD AUTO: 0.5 % — SIGNIFICANT CHANGE UP (ref 0–2)
BUN SERPL-MCNC: 9 MG/DL — SIGNIFICANT CHANGE UP (ref 8–20)
CALCIUM SERPL-MCNC: 8.7 MG/DL — SIGNIFICANT CHANGE UP (ref 8.6–10.2)
CHLORIDE SERPL-SCNC: 100 MMOL/L — SIGNIFICANT CHANGE UP (ref 98–107)
CO2 SERPL-SCNC: 24 MMOL/L — SIGNIFICANT CHANGE UP (ref 22–29)
CREAT SERPL-MCNC: 0.65 MG/DL — SIGNIFICANT CHANGE UP (ref 0.5–1.3)
EOSINOPHIL # BLD AUTO: 0.18 K/UL — SIGNIFICANT CHANGE UP (ref 0–0.5)
EOSINOPHIL NFR BLD AUTO: 3 % — SIGNIFICANT CHANGE UP (ref 0–6)
GLUCOSE BLDC GLUCOMTR-MCNC: 231 MG/DL — HIGH (ref 70–99)
GLUCOSE BLDC GLUCOMTR-MCNC: 243 MG/DL — HIGH (ref 70–99)
GLUCOSE BLDC GLUCOMTR-MCNC: 245 MG/DL — HIGH (ref 70–99)
GLUCOSE SERPL-MCNC: 281 MG/DL — HIGH (ref 70–99)
HCT VFR BLD CALC: 39.9 % — SIGNIFICANT CHANGE UP (ref 34.5–45)
HGB BLD-MCNC: 12.9 G/DL — SIGNIFICANT CHANGE UP (ref 11.5–15.5)
IMM GRANULOCYTES NFR BLD AUTO: 0.2 % — SIGNIFICANT CHANGE UP (ref 0–1.5)
LYMPHOCYTES # BLD AUTO: 1.86 K/UL — SIGNIFICANT CHANGE UP (ref 1–3.3)
LYMPHOCYTES # BLD AUTO: 30.6 % — SIGNIFICANT CHANGE UP (ref 13–44)
MCHC RBC-ENTMCNC: 30 PG — SIGNIFICANT CHANGE UP (ref 27–34)
MCHC RBC-ENTMCNC: 32.3 GM/DL — SIGNIFICANT CHANGE UP (ref 32–36)
MCV RBC AUTO: 92.8 FL — SIGNIFICANT CHANGE UP (ref 80–100)
MONOCYTES # BLD AUTO: 0.81 K/UL — SIGNIFICANT CHANGE UP (ref 0–0.9)
MONOCYTES NFR BLD AUTO: 13.3 % — SIGNIFICANT CHANGE UP (ref 2–14)
NEUTROPHILS # BLD AUTO: 3.19 K/UL — SIGNIFICANT CHANGE UP (ref 1.8–7.4)
NEUTROPHILS NFR BLD AUTO: 52.4 % — SIGNIFICANT CHANGE UP (ref 43–77)
PLATELET # BLD AUTO: 249 K/UL — SIGNIFICANT CHANGE UP (ref 150–400)
POTASSIUM SERPL-MCNC: 3.5 MMOL/L — SIGNIFICANT CHANGE UP (ref 3.5–5.3)
POTASSIUM SERPL-SCNC: 3.5 MMOL/L — SIGNIFICANT CHANGE UP (ref 3.5–5.3)
RBC # BLD: 4.3 M/UL — SIGNIFICANT CHANGE UP (ref 3.8–5.2)
RBC # FLD: 13.8 % — SIGNIFICANT CHANGE UP (ref 10.3–14.5)
SODIUM SERPL-SCNC: 138 MMOL/L — SIGNIFICANT CHANGE UP (ref 135–145)
WBC # BLD: 6.08 K/UL — SIGNIFICANT CHANGE UP (ref 3.8–10.5)
WBC # FLD AUTO: 6.08 K/UL — SIGNIFICANT CHANGE UP (ref 3.8–10.5)

## 2020-08-17 PROCEDURE — 83605 ASSAY OF LACTIC ACID: CPT

## 2020-08-17 PROCEDURE — 99232 SBSQ HOSP IP/OBS MODERATE 35: CPT

## 2020-08-17 PROCEDURE — 86769 SARS-COV-2 COVID-19 ANTIBODY: CPT

## 2020-08-17 PROCEDURE — 85027 COMPLETE CBC AUTOMATED: CPT

## 2020-08-17 PROCEDURE — U0003: CPT

## 2020-08-17 PROCEDURE — 82803 BLOOD GASES ANY COMBINATION: CPT

## 2020-08-17 PROCEDURE — 84295 ASSAY OF SERUM SODIUM: CPT

## 2020-08-17 PROCEDURE — 84100 ASSAY OF PHOSPHORUS: CPT

## 2020-08-17 PROCEDURE — 85014 HEMATOCRIT: CPT

## 2020-08-17 PROCEDURE — 82947 ASSAY GLUCOSE BLOOD QUANT: CPT

## 2020-08-17 PROCEDURE — 81001 URINALYSIS AUTO W/SCOPE: CPT

## 2020-08-17 PROCEDURE — 96361 HYDRATE IV INFUSION ADD-ON: CPT

## 2020-08-17 PROCEDURE — 87086 URINE CULTURE/COLONY COUNT: CPT

## 2020-08-17 PROCEDURE — 82330 ASSAY OF CALCIUM: CPT

## 2020-08-17 PROCEDURE — 99285 EMERGENCY DEPT VISIT HI MDM: CPT | Mod: 25

## 2020-08-17 PROCEDURE — 99239 HOSP IP/OBS DSCHRG MGMT >30: CPT

## 2020-08-17 PROCEDURE — 82435 ASSAY OF BLOOD CHLORIDE: CPT

## 2020-08-17 PROCEDURE — 80053 COMPREHEN METABOLIC PANEL: CPT

## 2020-08-17 PROCEDURE — 84145 PROCALCITONIN (PCT): CPT

## 2020-08-17 PROCEDURE — 84132 ASSAY OF SERUM POTASSIUM: CPT

## 2020-08-17 PROCEDURE — 83036 HEMOGLOBIN GLYCOSYLATED A1C: CPT

## 2020-08-17 PROCEDURE — 86803 HEPATITIS C AB TEST: CPT

## 2020-08-17 PROCEDURE — 83735 ASSAY OF MAGNESIUM: CPT

## 2020-08-17 PROCEDURE — 82962 GLUCOSE BLOOD TEST: CPT

## 2020-08-17 PROCEDURE — 80048 BASIC METABOLIC PNL TOTAL CA: CPT

## 2020-08-17 PROCEDURE — 36415 COLL VENOUS BLD VENIPUNCTURE: CPT

## 2020-08-17 PROCEDURE — 96360 HYDRATION IV INFUSION INIT: CPT

## 2020-08-17 RX ORDER — SACCHAROMYCES BOULARDII 250 MG
1 POWDER IN PACKET (EA) ORAL
Qty: 8 | Refills: 0
Start: 2020-08-17 | End: 2020-08-20

## 2020-08-17 RX ORDER — INSULIN GLARGINE 100 [IU]/ML
30 INJECTION, SOLUTION SUBCUTANEOUS EVERY MORNING
Refills: 0 | Status: DISCONTINUED | OUTPATIENT
Start: 2020-08-17 | End: 2020-08-17

## 2020-08-17 RX ORDER — INSULIN GLARGINE 100 [IU]/ML
5 INJECTION, SOLUTION SUBCUTANEOUS ONCE
Refills: 0 | Status: COMPLETED | OUTPATIENT
Start: 2020-08-17 | End: 2020-08-17

## 2020-08-17 RX ORDER — NITROFURANTOIN MACROCRYSTAL 50 MG
1 CAPSULE ORAL
Qty: 8 | Refills: 0
Start: 2020-08-17 | End: 2020-08-20

## 2020-08-17 RX ORDER — INSULIN LISPRO 100/ML
7 VIAL (ML) SUBCUTANEOUS
Refills: 0 | Status: DISCONTINUED | OUTPATIENT
Start: 2020-08-17 | End: 2020-08-17

## 2020-08-17 RX ORDER — INSULIN LISPRO 100/ML
7 VIAL (ML) SUBCUTANEOUS
Qty: 1 | Refills: 0
Start: 2020-08-17

## 2020-08-17 RX ORDER — INSULIN GLARGINE 100 [IU]/ML
30 INJECTION, SOLUTION SUBCUTANEOUS
Qty: 1 | Refills: 0
Start: 2020-08-17

## 2020-08-17 RX ORDER — SACCHAROMYCES BOULARDII 250 MG
250 POWDER IN PACKET (EA) ORAL
Refills: 0 | Status: DISCONTINUED | OUTPATIENT
Start: 2020-08-17 | End: 2020-08-17

## 2020-08-17 RX ORDER — LANOLIN ALCOHOL/MO/W.PET/CERES
5 CREAM (GRAM) TOPICAL AT BEDTIME
Refills: 0 | Status: COMPLETED | OUTPATIENT
Start: 2020-08-17 | End: 2020-08-17

## 2020-08-17 RX ADMIN — INSULIN GLARGINE 25 UNIT(S): 100 INJECTION, SOLUTION SUBCUTANEOUS at 08:11

## 2020-08-17 RX ADMIN — Medication 5 MILLIGRAM(S): at 01:39

## 2020-08-17 RX ADMIN — Medication 5 UNIT(S): at 12:11

## 2020-08-17 RX ADMIN — Medication 5 UNIT(S): at 08:49

## 2020-08-17 RX ADMIN — Medication 4: at 16:38

## 2020-08-17 RX ADMIN — Medication 100 MILLIGRAM(S): at 09:10

## 2020-08-17 RX ADMIN — Medication 100 MILLIGRAM(S): at 16:33

## 2020-08-17 RX ADMIN — INSULIN GLARGINE 5 UNIT(S): 100 INJECTION, SOLUTION SUBCUTANEOUS at 13:00

## 2020-08-17 RX ADMIN — Medication 250 MILLIGRAM(S): at 16:33

## 2020-08-17 RX ADMIN — Medication 4: at 11:59

## 2020-08-17 RX ADMIN — Medication 4: at 08:48

## 2020-08-17 RX ADMIN — Medication 7 UNIT(S): at 16:39

## 2020-08-17 NOTE — DISCHARGE NOTE PROVIDER - NSDCMRMEDTOKEN_GEN_ALL_CORE_FT
Basaglar KwikPen 100 units/mL subcutaneous solution: 30 unit(s) subcutaneous once a day   Glucometer:   HumaLOG KwikPen 100 units/mL injectable solution: 7 unit(s) injectable 3 times a day (with meals)   Insulin pen needles:   Lansets to check FS 4xdaily:   nitrofurantoin macrocrystals-monohydrate 100 mg oral capsule: 1 cap(s) orally 2 times a day (with meals)  saccharomyces boulardii lyo 250 mg oral capsule: 1 cap(s) orally 2 times a day  Test strips to check FS 4xdaily:

## 2020-08-17 NOTE — DISCHARGE NOTE PROVIDER - HOSPITAL COURSE
54yoF hx obesity, strong FHx of DM in multiple family members, who presented to urgent care with 1 week polyuria/polydipsia, she was diagnosed with new diagnosis of DM and started on Insulin treatment. A1c 13.  She also found to have signs of UTI. Patient treated with Macrobid to complete 5days.  Patient also treated with Diflucan for vaginitis secondary to uncontrolled DM.  Hyperglycemia slowly improving.  Insulin dosages adjusted.  Endocrine following.  Patient verbalized understanding of insulin teaching, checking glucose and diabetic diet.  Nutrition following.  Patient stable for discharge to home with outpatient follow up with primary doctor and endocrinology.          Vital Signs Last 24 Hrs    T(C): 36.3 (17 Aug 2020 08:19), Max: 37 (16 Aug 2020 23:48)    T(F): 97.3 (17 Aug 2020 08:19), Max: 98.6 (16 Aug 2020 23:48)    HR: 80 (17 Aug 2020 08:19) (80 - 86)    BP: 107/71 (17 Aug 2020 08:19) (107/71 - 137/62)    BP(mean): --    RR: 18 (17 Aug 2020 08:19) (18 - 18)    SpO2: 94% (17 Aug 2020 08:19) (94% - 96%)        PHYSICAL EXAM:    GENERAL: NAD, well-groomed, well-developed    HEAD:  Atraumatic, Normocephalic    NECK: Supple, No JVD, Normal thyroid    NERVOUS SYSTEM:  Alert & Oriented X3, Good concentration; Motor Strength 5/5 B/L upper and lower extremities    CHEST/LUNG: Clear to auscultation bilaterally; No rales, rhonchi, wheezing, or rubs    HEART: Regular rate and rhythm; No murmurs, rubs, or gallops    ABDOMEN: Soft, Nontender, Nondistended; Bowel sounds present    EXTREMITIES:  2+ Peripheral Pulses, No clubbing, cyanosis, or edema

## 2020-08-17 NOTE — DISCHARGE NOTE PROVIDER - NSDCCPCAREPLAN_GEN_ALL_CORE_FT
PRINCIPAL DISCHARGE DIAGNOSIS  Diagnosis: DKA, type 2  Assessment and Plan of Treatment: Continue current medications as prescribed.  Follow up with primary doctor and endocrinology.  Check FS 4xdaily and record results - bring results to appointment.      SECONDARY DISCHARGE DIAGNOSES  Diagnosis: Acute UTI  Assessment and Plan of Treatment: Complete antibiotic course.  Follow up with primary doctor.    Diagnosis: Vaginitis  Assessment and Plan of Treatment: Follow up with primary doctor.

## 2020-08-17 NOTE — PROGRESS NOTE ADULT - ASSESSMENT
54yoF hx obesity, strong FHx of DM in multiple family members, who presented to urgent care with 1 week  poliruia/polydipsis, she was diagnosed with new diagnosis of DM and started on Insulin treatment. She also found to have signs of UTI.    #New onset, uncontrolled diabetes mellitus with hyperglycemia, has improved  -  HgbA1c 13  - closing AG and trending up Bicarb  - increase basal insulin to 25 units, 5 AC with MDSS for now,  and DM diet, will adjust Insulin base on POC  - appreciate Endocrinology recommendation   - counseled on new Dm diagnosis, ongoing teaching for Insulin injections and FS by nursing.   - d/c IV  fluids, encourage fluid intake    #Acute cystitis, has improved  -Pt with urinary symptoms, +UA, no systemic signs of infection  -continue  microbid x 5 days    Vaginitis   -Pt with likely candida vulvo-vaginitis from uncontrolled DM  -Received diflucan x 1 in ED    Hyponatremia  -Pseudohyponatremia from hyperglycemia    Obesity due to excessive calory intake, BMi >40  - educated about importance of weigh loss      Prophylactic measure  -Lovenox  Activity Level:  as tolerated   Advanced Directives: full code  Disposition: will continue to titrat Insulin anticipation 2 days of hospital stay.
54yoF hx obesity, strong FHx of DM in multiple family members, who presented to urgent care with 1 week hx of urinary frequency, dysuria, hematuria with vaginal discharge and pruritis and was sent to ED for having glucose >500; on admission, pt with serum glucose 633, AG 20, HgbA1c 13, being admitted for new onset diabetes mellitus    New onset, uncontrolled diabetes mellitus with hyperglycemia and mild DKA  - HgbA1c 13  - increase lantsu to 25 u daily.   - start lispro 5 u TDI w meals  - start SSI   - check bsg actid and hs     Hyponatremia: resolved.     Acute cystitis: started on macrobid  x 5 days    Vaginitis   -Pt with likely candida vulvo-vaginitis from uncontrolled DM  -Received dilfucan x 1 in ED
54yoF hx obesity, strong FHx of DM in multiple family members, who presented to urgent care with 1 week  poliruia/polydipsis, she was diagnosed with new diagnosis of DM and started on Insulin treatment. She also found to have signs of UTI.    #New onset, uncontrolled diabetes mellitus with hyperglycemia, has improved  -  HgbA1c 13  - repeat BMP with mild downtreding hco3 but improving AG  - continue basal insulin 20 units, MDSS for now, and DM diet, will adjust Insulin base on POC  - appreciate Endocrinology recommendation   - counseled on new Dm diagnosis, pending DM teaching.   - will give another liter of LR @ 100 cc/hr today     #Acute cystitis, has improved  -Pt with urinary symptoms, +UA, no systemic signs of infection  -continue  microbid x 5 days    Vaginitis   -Pt with likely candida vulvo-vaginitis from uncontrolled DM  -Received diflucan x 1 in ED    Hyponatremia  -Pseudohyponatremia from hyperglycemia    Obesity due to excessive calory intake, BMi >40  - educated about importance of weigh loss      Prophylactic measure  -Lovenox  Activity Level:  as tolerated   Advanced Directives: full code  Disposition: will continue to titrat Insulin anticipation 2 days of hospital stay.
54yoF hx obesity, strong FHx of DM in multiple family members, who presented to urgent care with 1 week hx of urinary frequency, dysuria, hematuria with vaginal discharge and pruritis and was sent to ED for having glucose >500; on admission, pt with serum glucose 633, AG 20, HgbA1c 13, being admitted for new onset diabetes mellitus  1. T2DM - suboptimal control  - cont Lantus 30 units and Humalog 7  units w meals (dose increased today)  - pt will go home on basal/bolus insulin regimen, received teaching while in hospital  - should f/u in our office as out pt for DM management    2. Acute cystitis - improved sx  - cont macrobid    3. Vaginitis due to uncontrolled sugars  - s/p Diflucan    4. Obesity  - weight loss w lifestyle changes
54yoF hx obesity, strong FHx of DM in multiple family members, who presented to urgent care with 1 week polyuria/polydipsia, she was diagnosed with new diagnosis of DM and started on Insulin treatment. She also found to have signs of UTI.    #New onset, uncontrolled diabetes mellitus with hyperglycemia, has improved  -  HgbA1c 13  - closing AG and trending up Bicarb  - increase basal insulin to 30 units, 7 AC with MDSS for now, and DM diet  - appreciate Endocrinology recommendation   - counseled on new Dm diagnosis, ongoing teaching for Insulin injections and FS by nursing.   - encourage fluid intake    #Acute cystitis, has improved  -Pt with urinary symptoms, +UA, no systemic signs of infection  -continue macrobid x 5 days    Vaginitis   -Pt with likely candida vulvo-vaginitis from uncontrolled DM  -Received diflucan x 1 in ED    Hyponatremia  -Pseudohyponatremia from hyperglycemia - improved    Obesity due to excessive calorie intake, BMI >40  - educated about importance of weigh loss      Prophylactic measure  -Lovenox  Activity Level:  as tolerated   Advanced Directives: full code  Disposition: Discharge today with outpatient follow up with primary doctor and endocrine

## 2020-08-17 NOTE — DIETITIAN INITIAL EVALUATION ADULT. - PERTINENT LABORATORY DATA
08-17 Na138 mmol/L Glu 281 mg/dL<H> K+ 3.5 mmol/L Cr  0.65 mg/dL BUN 9.0 mg/dL Phos n/a   Alb n/a   PAB n/a

## 2020-08-17 NOTE — DISCHARGE NOTE PROVIDER - CARE PROVIDER_API CALL
Reece Bo  [facility] [place] [address]  [place] [address]  Phone: [phone]  Fax: [phone]  Follow Up Time:     Tere Corona  ENDOCRINOLOGY/METAB/DIABETES  [address]  [place] [address]  Phone: [phone]  Fax: [phone]  Follow Up Time:

## 2020-08-17 NOTE — DISCHARGE NOTE NURSING/CASE MANAGEMENT/SOCIAL WORK - PATIENT PORTAL LINK FT
You can access the FollowMyHealth Patient Portal offered by [facility] by registering at the following website: [email or web address]. By joining [facility]’s FollowMyHealth portal, you will also be able to view your health information using other applications (apps) compatible with our system.

## 2020-08-17 NOTE — PROGRESS NOTE ADULT - ATTENDING COMMENTS
I personally seen and examined simona, author reviewed available labs, imaging, consultants recommendations, discuss results and plan of care with patient and patient.     I agree with above physical exam, assessment and plan.     We spend 40 min coordination discharge

## 2020-08-17 NOTE — PROGRESS NOTE ADULT - REASON FOR ADMISSION
new onset, uncontrolled DM, mild DKA

## 2020-08-17 NOTE — PROGRESS NOTE ADULT - SUBJECTIVE AND OBJECTIVE BOX
INTERVAL HPI/OVERNIGHT EVENTS:  followup for dm management.     MEDICATIONS  (STANDING):  dextrose 5%. 1000 milliLiter(s) (50 mL/Hr) IV Continuous <Continuous>  dextrose 50% Injectable 12.5 Gram(s) IV Push once  dextrose 50% Injectable 25 Gram(s) IV Push once  dextrose 50% Injectable 25 Gram(s) IV Push once  enoxaparin Injectable 40 milliGRAM(s) SubCutaneous daily  insulin glargine Injectable (LANTUS) 25 Unit(s) SubCutaneous every morning  insulin lispro (HumaLOG) corrective regimen sliding scale   SubCutaneous three times a day before meals  insulin lispro (HumaLOG) corrective regimen sliding scale   SubCutaneous at bedtime  lactated ringers. 1000 milliLiter(s) (100 mL/Hr) IV Continuous <Continuous>  lactated ringers. 1000 milliLiter(s) (100 mL/Hr) IV Continuous <Continuous>  nitrofurantoin monohydrate/macrocrystals (MACROBID) 100 milliGRAM(s) Oral two times a day with meals    MEDICATIONS  (PRN):  dextrose 40% Gel 15 Gram(s) Oral once PRN Blood Glucose LESS THAN 70 milliGRAM(s)/deciliter  glucagon  Injectable 1 milliGRAM(s) IntraMuscular once PRN Glucose LESS THAN 70 milligrams/deciliter    Allergies  No Known Allergies    Review of systems: noCP, no SOb, no cough     Vital Signs Last 24 Hrs  T(C): 36.6 (16 Aug 2020 09:20), Max: 36.9 (15 Aug 2020 23:54)  T(F): 97.9 (16 Aug 2020 09:20), Max: 98.5 (15 Aug 2020 23:54)  HR: 83 (16 Aug 2020 09:20) (76 - 83)  BP: 118/64 (16 Aug 2020 09:20) (118/64 - 142/95)  BP(mean): --  RR: 19 (16 Aug 2020 09:20) (16 - 19)  SpO2: 98% (16 Aug 2020 09:20) (95% - 98%)    PHYSICAL EXAM:  General appearance: Well developed, obese   Eyes: Pupils equal and reactive to light. EOM full.   Neck: Trachea midline.   Lungs: Normal respiratory excursion. Lungs clear.  CV: Regular cardiac rhythm. No murmur. Carotid and pedal pulses intact.  Abdomen: Soft, non tender, no organomegaly or mass.  Musculoskeletal: No cyanosis, clubbing, or edema. No pedal lesions.  Skin: Warm and moist. No rash.   Neuro: Cranial nerves intact. Normal motor and sensory function.   Psych: Normal affect    LABS:                        13.5   6.12  )-----------( 249      ( 16 Aug 2020 07:13 )             41.5     08-16    135  |  94<L>  |  7.0<L>  ----------------------------<  292<H>  4.0   |  26.0  |  0.70    Ca    8.9      16 Aug 2020 09:07  Phos  2.5     08-15  Mg     1.8     08-15    TPro  7.7  /  Alb  4.5  /  TBili  0.6  /  DBili  x   /  AST  20  /  ALT  31  /  AlkPhos  113  08-14    Urinalysis Basic - ( 14 Aug 2020 22:04 )    Color: Yellow / Appearance: Slightly Turbid / S.010 / pH: x  Gluc: x / Ketone: Moderate  / Bili: Negative / Urobili: Negative mg/dL   Blood: x / Protein: 30 mg/dL / Nitrite: Negative   Leuk Esterase: Moderate / RBC: 11-25 /HPF / WBC 11-25   Sq Epi: x / Non Sq Epi: Few / Bacteria: Occasional
[facility] Division of Hospital Medicine    Chief Complaint:  polyuria/polydipsia    SUBJECTIVE: reports some dry cough, denied any fever, chills, other URI symptoms    OVERNIGHT EVENTS: none    Patient denies chest pain, SOB, abd pain, N/V, fever, chills, dysuria or any other complaints. All remainder ROS negative.     MEDICATIONS  (STANDING):  dextrose 5%. 1000 milliLiter(s) (50 mL/Hr) IV Continuous <Continuous>  dextrose 50% Injectable 12.5 Gram(s) IV Push once  dextrose 50% Injectable 25 Gram(s) IV Push once  dextrose 50% Injectable 25 Gram(s) IV Push once  enoxaparin Injectable 40 milliGRAM(s) SubCutaneous daily  insulin glargine Injectable (LANTUS) 25 Unit(s) SubCutaneous every morning  insulin lispro (HumaLOG) corrective regimen sliding scale   SubCutaneous three times a day before meals  insulin lispro (HumaLOG) corrective regimen sliding scale   SubCutaneous at bedtime  insulin lispro Injectable (HumaLOG) 5 Unit(s) SubCutaneous three times a day before meals  lactated ringers. 1000 milliLiter(s) (100 mL/Hr) IV Continuous <Continuous>  lactated ringers. 1000 milliLiter(s) (100 mL/Hr) IV Continuous <Continuous>  nitrofurantoin monohydrate/macrocrystals (MACROBID) 100 milliGRAM(s) Oral two times a day with meals    MEDICATIONS  (PRN):  dextrose 40% Gel 15 Gram(s) Oral once PRN Blood Glucose LESS THAN 70 milliGRAM(s)/deciliter  glucagon  Injectable 1 milliGRAM(s) IntraMuscular once PRN Glucose LESS THAN 70 milligrams/deciliter        I&O's Summary    15 Aug 2020 07  -  16 Aug 2020 07:00  --------------------------------------------------------  IN: 100 mL / OUT: 0 mL / NET: 100 mL    16 Aug 2020 07:01  -  16 Aug 2020 16:21  --------------------------------------------------------  IN: 118 mL / OUT: 0 mL / NET: 118 mL        PHYSICAL EXAM:  Vital Signs Last 24 Hrs  T(C): 36.9 (16 Aug 2020 15:50), Max: 36.9 (15 Aug 2020 23:54)  T(F): 98.5 (16 Aug 2020 15:50), Max: 98.5 (15 Aug 2020 23:54)  HR: 83 (16 Aug 2020 15:50) (76 - 83)  BP: 134/85 (16 Aug 2020 15:50) (118/64 - 142/95)  BP(mean): --  RR: 19 (16 Aug 2020 09:20) (16 - 19)  SpO2: 98% (16 Aug 2020 09:20) (95% - 98%)        CONSTITUTIONAL: NAD, well-developed, well-groomed, obese  ENMT: Moist oral mucosa, no pharyngeal injection or exudates; normal dentition; No JVD  RESPIRATORY: Normal respiratory effort; lungs are clear to auscultation bilaterally  CARDIOVASCULAR: Regular rate and rhythm, normal S1 and S2, no murmur/rub/gallop; No lower extremity edema; Peripheral pulses are 2+ bilaterally  ABDOMEN: Nontender to palpation, normoactive bowel sounds, no rebound/guarding; No hepatosplenomegaly  MUSCLOSKELETAL:  Normal gait; no clubbing or cyanosis of digits; no joint swelling or tenderness to palpation  PSYCH: A+O to person, place, and time; affect appropriate  NEUROLOGY: CN 2-12 are intact and symmetric; no gross sensory deficits; was observed moving all 4 ext against gravity cooperating with exam.   SKIN: No rashes; no palpable lesions    LABS:                        13.5   6.12  )-----------( 249      ( 16 Aug 2020 07:13 )             41.5     08-16    135  |  94<L>  |  7.0<L>  ----------------------------<  292<H>  4.0   |  26.0  |  0.70    Ca    8.9      16 Aug 2020 09:07  Phos  2.5     08-15  Mg     1.8     08-15    TPro  7.7  /  Alb  4.5  /  TBili  0.6  /  DBili  x   /  AST  20  /  ALT  31  /  AlkPhos  113  08-14          Urinalysis Basic - ( 14 Aug 2020 22:04 )    Color: Yellow / Appearance: Slightly Turbid / S.010 / pH: x  Gluc: x / Ketone: Moderate  / Bili: Negative / Urobili: Negative mg/dL   Blood: x / Protein: 30 mg/dL / Nitrite: Negative   Leuk Esterase: Moderate / RBC: 11-25 /HPF / WBC 11-25   Sq Epi: x / Non Sq Epi: Few / Bacteria: Occasional        CAPILLARY BLOOD GLUCOSE      POCT Blood Glucose.: 310 mg/dL (16 Aug 2020 12:05)  POCT Blood Glucose.: 296 mg/dL (16 Aug 2020 07:43)  POCT Blood Glucose.: 273 mg/dL (15 Aug 2020 20:40)  POCT Blood Glucose.: 202 mg/dL (15 Aug 2020 16:25)        RADIOLOGY & ADDITIONAL TESTS:  Results Reviewed:   Imaging Personally Reviewed:  Electrocardiogram Personally Reviewed:
CC: new onset, uncontrolled DM, mild DKA (16 Aug 2020 16:21)    HPI:  54yoF hx obesity but no other PMHx who presents to [facility] ED after being sent from urgent care for hyperglycemia.  She also had increased vaginal discharge associated with pruritus.  She went to urgent care due to above symptoms, her labs were checked which showed glucose >500 and pt was advised to go to ER.  On admission, FS >530, labs notable for serums glucose 633, AG of 20, HgbA1c 13, pH 7.30 on VBG. UA positive.  In ED, received Humalog 15U x 1 with improvement of glucose to 362, resolution of AG and normalization of pH on VBG. (15 Aug 2020 04:44)    INTERVAL HPI/OVERNIGHT EVENTS: Patient seen and examined lying in bed.  Patient complaining of fatigue, reports she didn't sleep well last night.  Patient denies any headache, dizziness, SOB, CP, abdominal pain, nausea, vomiting, dysuria.  Verbalized understanding of insulin teaching, and RN confirmed.  Other ROS reviewed and are negative.    Vital Signs Last 24 Hrs  T(C): 36.3 (17 Aug 2020 08:19), Max: 37 (16 Aug 2020 23:48)  T(F): 97.3 (17 Aug 2020 08:19), Max: 98.6 (16 Aug 2020 23:48)  HR: 80 (17 Aug 2020 08:19) (80 - 86)  BP: 107/71 (17 Aug 2020 08:19) (107/71 - 137/62)  BP(mean): --  RR: 18 (17 Aug 2020 08:19) (18 - 18)  SpO2: 94% (17 Aug 2020 08:19) (94% - 96%)  I&O's Detail    16 Aug 2020 07:01  -  17 Aug 2020 07:00  --------------------------------------------------------  IN:    Oral Fluid: 118 mL  Total IN: 118 mL    OUT:  Total OUT: 0 mL    Total NET: 118 mL      PHYSICAL EXAM:  GENERAL: NAD  HEAD:  Atraumatic, Normocephalic  NECK: Supple, No JVD, Normal thyroid  NERVOUS SYSTEM:  Alert & Oriented X3, Good concentration; Motor Strength 5/5 B/L upper and lower extremities  CHEST/LUNG: Clear to auscultation bilaterally; No rales, rhonchi, wheezing, or rubs  HEART: Regular rate and rhythm; No murmurs, rubs, or gallops  ABDOMEN: Soft, Nontender, Nondistended; Bowel sounds present  EXTREMITIES:  2+ Peripheral Pulses, No clubbing, cyanosis, or edema                                12.9   6.08  )-----------( 249      ( 17 Aug 2020 07:15 )             39.9     17 Aug 2020 07:15    138    |  100    |  9.0    ----------------------------<  281    3.5     |  24.0   |  0.65     Ca    8.7        17 Aug 2020 07:15        CAPILLARY BLOOD GLUCOSE  POCT Blood Glucose.: 243 mg/dL (17 Aug 2020 11:57)  POCT Blood Glucose.: 245 mg/dL (17 Aug 2020 08:33)  POCT Blood Glucose.: 387 mg/dL (16 Aug 2020 21:23)  POCT Blood Glucose.: 363 mg/dL (16 Aug 2020 16:59)          MEDICATIONS  (STANDING):  dextrose 5%. 1000 milliLiter(s) (50 mL/Hr) IV Continuous <Continuous>  dextrose 50% Injectable 12.5 Gram(s) IV Push once  dextrose 50% Injectable 25 Gram(s) IV Push once  dextrose 50% Injectable 25 Gram(s) IV Push once  enoxaparin Injectable 40 milliGRAM(s) SubCutaneous daily  insulin glargine Injectable (LANTUS) 30 Unit(s) SubCutaneous every morning  insulin lispro (HumaLOG) corrective regimen sliding scale   SubCutaneous three times a day before meals  insulin lispro (HumaLOG) corrective regimen sliding scale   SubCutaneous at bedtime  insulin lispro Injectable (HumaLOG) 7 Unit(s) SubCutaneous three times a day before meals  nitrofurantoin monohydrate/macrocrystals (MACROBID) 100 milliGRAM(s) Oral two times a day with meals  saccharomyces boulardii 250 milliGRAM(s) Oral two times a day    MEDICATIONS  (PRN):  benzocaine 15 mG/menthol 3.6 mG (Sugar-Free) Lozenge 1 Lozenge Oral every 3 hours PRN Sore Throat  dextrose 40% Gel 15 Gram(s) Oral once PRN Blood Glucose LESS THAN 70 milliGRAM(s)/deciliter  glucagon  Injectable 1 milliGRAM(s) IntraMuscular once PRN Glucose LESS THAN 70 milligrams/deciliter
Follow up: T2DM  Interval Hx/Events: no issues    MEDICATIONS  (STANDING):  dextrose 5%. 1000 milliLiter(s) (50 mL/Hr) IV Continuous <Continuous>  dextrose 50% Injectable 12.5 Gram(s) IV Push once  dextrose 50% Injectable 25 Gram(s) IV Push once  dextrose 50% Injectable 25 Gram(s) IV Push once  enoxaparin Injectable 40 milliGRAM(s) SubCutaneous daily  insulin glargine Injectable (LANTUS) 30 Unit(s) SubCutaneous every morning  insulin lispro (HumaLOG) corrective regimen sliding scale   SubCutaneous three times a day before meals  insulin lispro (HumaLOG) corrective regimen sliding scale   SubCutaneous at bedtime  insulin lispro Injectable (HumaLOG) 7 Unit(s) SubCutaneous three times a day before meals  nitrofurantoin monohydrate/macrocrystals (MACROBID) 100 milliGRAM(s) Oral two times a day with meals  saccharomyces boulardii 250 milliGRAM(s) Oral two times a day    MEDICATIONS  (PRN):  benzocaine 15 mG/menthol 3.6 mG (Sugar-Free) Lozenge 1 Lozenge Oral every 3 hours PRN Sore Throat  dextrose 40% Gel 15 Gram(s) Oral once PRN Blood Glucose LESS THAN 70 milliGRAM(s)/deciliter  glucagon  Injectable 1 milliGRAM(s) IntraMuscular once PRN Glucose LESS THAN 70 milligrams/deciliter      ALLERGIES: No Known Allergies      REVIEW OF SYSTEMS:  CONSTITUTIONAL: No fever, weight loss, or fatigue  EYES: No blurry vision  RESPIRATORY: No cough, No shortness of breath  CARDIOVASCULAR: No chest pain, No palpitations  GASTROINTESTINAL: No abdominal or epigastric pain. No nausea or vomitin  GENITOURINARY: No dysuria  SKIN: No rashes  PSYCHIATRIC: No depression or anxiety      Vital Signs Last 24 Hrs  T(C): 36.3 (17 Aug 2020 08:19), Max: 37 (16 Aug 2020 23:48)  T(F): 97.3 (17 Aug 2020 08:19), Max: 98.6 (16 Aug 2020 23:48)  HR: 80 (17 Aug 2020 08:19) (80 - 86)  BP: 107/71 (17 Aug 2020 08:19) (107/71 - 137/62)  BP(mean): --  RR: 18 (17 Aug 2020 08:19) (18 - 18)  SpO2: 94% (17 Aug 2020 08:19) (94% - 96%)    Physical Exam:  General appearance: Well developed, well nourished.  Eyes: Pupils equal. EOMI  Lungs: Normal respiratory excursion. Lungs clear no w/r/r  CV: Normal S1S2, regular. No m/r/g. No edema  Abdomen: Soft, nontender, nondistended, (+) BS  Musculoskeletal: No cyanosis, clubbing, or edema.  Skin: Warm and moist.   Neuro: Cranial nerves intact.   Psych: Normal affect, good judgement/insight      LABS:                        12.9   6.08  )-----------( 249      ( 17 Aug 2020 07:15 )             39.9     08-17    138  |  100  |  9.0  ----------------------------<  281<H>  3.5   |  24.0  |  0.65    Ca    8.7      17 Aug 2020 07:15          A1C with Estimated Average Glucose Result: 13.5 % (08-14-20 @ 22:03)      CAPILLARY BLOOD GLUCOSE  POCT Blood Glucose.: 231 mg/dL (17 Aug 2020 16:32)  POCT Blood Glucose.: 243 mg/dL (17 Aug 2020 11:57)  POCT Blood Glucose.: 245 mg/dL (17 Aug 2020 08:33)  POCT Blood Glucose.: 387 mg/dL (16 Aug 2020 21:23)  POCT Blood Glucose.: 363 mg/dL (16 Aug 2020 16:59)  POCT Blood Glucose.: 310 mg/dL (16 Aug 2020 12:05)  POCT Blood Glucose.: 296 mg/dL (16 Aug 2020 07:43)  POCT Blood Glucose.: 273 mg/dL (15 Aug 2020 20:40)
[facility] Division of Hospital Medicine    Chief Complaint:  polyria/polidypsia/fatigue    SUBJECTIVE: reports improvement in polyria/polidypsia, deneid any suprapubic pain or dysuria  OVERNIGHT EVENTS: started on Insulin with improvement in FS.     Patient denies chest pain, SOB, abd pain, N/V, fever, chills, dysuria or any other complaints. All remainder ROS negative.     MEDICATIONS  (STANDING):  dextrose 5%. 1000 milliLiter(s) (50 mL/Hr) IV Continuous <Continuous>  dextrose 50% Injectable 12.5 Gram(s) IV Push once  dextrose 50% Injectable 25 Gram(s) IV Push once  dextrose 50% Injectable 25 Gram(s) IV Push once  enoxaparin Injectable 40 milliGRAM(s) SubCutaneous daily  insulin glargine Injectable (LANTUS) 20 Unit(s) SubCutaneous every morning  insulin lispro (HumaLOG) corrective regimen sliding scale   SubCutaneous three times a day before meals  insulin lispro (HumaLOG) corrective regimen sliding scale   SubCutaneous at bedtime  lactated ringers. 1000 milliLiter(s) (100 mL/Hr) IV Continuous <Continuous>  lactated ringers. 1000 milliLiter(s) (100 mL/Hr) IV Continuous <Continuous>  nitrofurantoin monohydrate/macrocrystals (MACROBID) 100 milliGRAM(s) Oral two times a day with meals    MEDICATIONS  (PRN):  dextrose 40% Gel 15 Gram(s) Oral once PRN Blood Glucose LESS THAN 70 milliGRAM(s)/deciliter  glucagon  Injectable 1 milliGRAM(s) IntraMuscular once PRN Glucose LESS THAN 70 milligrams/deciliter        I&O's Summary      PHYSICAL EXAM:  Vital Signs Last 24 Hrs  T(C): 36.6 (15 Aug 2020 08:46), Max: 37 (15 Aug 2020 05:45)  T(F): 97.8 (15 Aug 2020 08:46), Max: 98.6 (15 Aug 2020 05:45)  HR: 72 (15 Aug 2020 08:46) (72 - 91)  BP: 116/83 (15 Aug 2020 08:46) (116/83 - 140/85)  BP(mean): --  RR: 18 (15 Aug 2020 08:46) (18 - 18)  SpO2: 98% (15 Aug 2020 08:46) (96% - 98%)        CONSTITUTIONAL: NAD, well-developed, well-groomed, obese  ENMT: Moist oral mucosa, no pharyngeal injection or exudates; normal dentition; No JVD  RESPIRATORY: Normal respiratory effort; lungs are clear to auscultation bilaterally  CARDIOVASCULAR: Regular rate and rhythm, normal S1 and S2, no murmur/rub/gallop; No lower extremity edema; Peripheral pulses are 2+ bilaterally  ABDOMEN: Nontender to palpation, normoactive bowel sounds, no rebound/guarding; No hepatosplenomegaly  MUSCLOSKELETAL:  Normal gait; no clubbing or cyanosis of digits; no joint swelling or tenderness to palpation, feet with dry skin and signs of onychomycosis but no ulcers or skin brakes.  PSYCH: A+O to person, place, and time; affect appropriate  NEUROLOGY: CN 2-12 are intact and symmetric; no gross sensory deficits; was observed moving all 4 ext against gravity cooperating with exam.   SKIN: No rashes; no palpable lesions    LABS:                        14.4   7.14  )-----------( 280      ( 14 Aug 2020 22:03 )             43.6     08-15    131<L>  |  93<L>  |  7.0<L>  ----------------------------<  362<H>  3.5   |  21.0<L>  |  0.84    Ca    9.3      15 Aug 2020 02:16  Phos  2.5     08-15  Mg     1.8     08-15    TPro  7.7  /  Alb  4.5  /  TBili  0.6  /  DBili  x   /  AST  20  /  ALT  31  /  AlkPhos  113  08-14          Urinalysis Basic - ( 14 Aug 2020 22:04 )    Color: Yellow / Appearance: Slightly Turbid / S.010 / pH: x  Gluc: x / Ketone: Moderate  / Bili: Negative / Urobili: Negative mg/dL   Blood: x / Protein: 30 mg/dL / Nitrite: Negative   Leuk Esterase: Moderate / RBC: 11-25 /HPF / WBC 11-25   Sq Epi: x / Non Sq Epi: Few / Bacteria: Occasional        CAPILLARY BLOOD GLUCOSE      POCT Blood Glucose.: 268 mg/dL (15 Aug 2020 11:51)  POCT Blood Glucose.: 300 mg/dL (15 Aug 2020 08:28)  POCT Blood Glucose.: 273 mg/dL (15 Aug 2020 04:50)  POCT Blood Glucose.: 341 mg/dL (15 Aug 2020 02:23)  POCT Blood Glucose.: 430 mg/dL (15 Aug 2020 00:31)  POCT Blood Glucose.: >530 mg/dL (14 Aug 2020 21:13)        RADIOLOGY & ADDITIONAL TESTS:  Results Reviewed:   Imaging Personally Reviewed:  Electrocardiogram Personally Reviewed:

## 2020-08-17 NOTE — DIETITIAN INITIAL EVALUATION ADULT. - OTHER INFO
54yoF hx obesity, strong FHx of DM in multiple family members, who presented to urgent care with 1 week polyuria/polydipsia, she was diagnosed with new diagnosis of DM and started on Insulin treatment. A1c 13.  She also found to have signs of UTI. Patient also treated with Diflucan for vaginitis secondary to uncontrolled DM. Hyperglycemia slowly improving. Insulin dosages adjusted.  Endocrine following. Pt reported good po intake PTA and currently. Pt stated her wt frequently fluctuates, #. Pt provided with written and verbal diabetes and portion control nutrition education. Pt verbalized understanding. Pt encouraged to see an outside RD for further DM education. HgbA1c 13% noted.

## 2020-09-25 ENCOUNTER — NON-APPOINTMENT (OUTPATIENT)
Age: 55
End: 2020-09-25

## 2020-09-25 ENCOUNTER — APPOINTMENT (OUTPATIENT)
Dept: CHRONIC DISEASE MANAGEMENT | Facility: CLINIC | Age: 55
End: 2020-09-25

## 2020-09-25 VITALS — HEIGHT: 61 IN | BODY MASS INDEX: 55.32 KG/M2 | WEIGHT: 293 LBS

## 2020-09-28 PROBLEM — E66.9 OBESITY, UNSPECIFIED: Chronic | Status: ACTIVE | Noted: 2020-08-14

## 2020-10-23 ENCOUNTER — APPOINTMENT (OUTPATIENT)
Dept: CHRONIC DISEASE MANAGEMENT | Facility: CLINIC | Age: 55
End: 2020-10-23

## 2020-10-23 ENCOUNTER — NON-APPOINTMENT (OUTPATIENT)
Age: 55
End: 2020-10-23

## 2020-11-24 ENCOUNTER — APPOINTMENT (OUTPATIENT)
Dept: CHRONIC DISEASE MANAGEMENT | Facility: CLINIC | Age: 55
End: 2020-11-24

## 2020-11-24 ENCOUNTER — NON-APPOINTMENT (OUTPATIENT)
Age: 55
End: 2020-11-24

## 2021-09-07 ENCOUNTER — INPATIENT (INPATIENT)
Facility: HOSPITAL | Age: 56
LOS: 19 days | Discharge: HOME | DRG: 981 | End: 2021-09-27
Attending: NEUROLOGICAL SURGERY | Admitting: HOSPITALIST
Payer: COMMERCIAL

## 2021-09-07 VITALS
OXYGEN SATURATION: 100 % | RESPIRATION RATE: 8 BRPM | SYSTOLIC BLOOD PRESSURE: 112 MMHG | HEART RATE: 130 BPM | DIASTOLIC BLOOD PRESSURE: 88 MMHG | WEIGHT: 250 LBS | HEIGHT: 66 IN

## 2021-09-07 DIAGNOSIS — Z98.890 OTHER SPECIFIED POSTPROCEDURAL STATES: Chronic | ICD-10-CM

## 2021-09-07 LAB
ALBUMIN SERPL ELPH-MCNC: 4.4 G/DL — SIGNIFICANT CHANGE UP (ref 3.3–5.2)
ALP SERPL-CCNC: 137 U/L — HIGH (ref 40–120)
ALT FLD-CCNC: 20 U/L — SIGNIFICANT CHANGE UP
ANION GAP SERPL CALC-SCNC: SIGNIFICANT CHANGE UP MMOL/L (ref 5–17)
APTT BLD: 29 SEC — SIGNIFICANT CHANGE UP (ref 27.5–35.5)
AST SERPL-CCNC: 21 U/L — SIGNIFICANT CHANGE UP
BASOPHILS # BLD AUTO: 0.11 K/UL — SIGNIFICANT CHANGE UP (ref 0–0.2)
BASOPHILS NFR BLD AUTO: 0.7 % — SIGNIFICANT CHANGE UP (ref 0–2)
BILIRUB SERPL-MCNC: 0.2 MG/DL — LOW (ref 0.4–2)
BUN SERPL-MCNC: 21.2 MG/DL — HIGH (ref 8–20)
CA-I SERPL-SCNC: 1.24 MMOL/L — SIGNIFICANT CHANGE UP (ref 1.15–1.33)
CALCIUM SERPL-MCNC: 9.4 MG/DL — SIGNIFICANT CHANGE UP (ref 8.6–10.2)
CHLORIDE BLDV-SCNC: 104 MMOL/L — SIGNIFICANT CHANGE UP (ref 98–107)
CHLORIDE SERPL-SCNC: 95 MMOL/L — LOW (ref 98–107)
CK SERPL-CCNC: 99 U/L — SIGNIFICANT CHANGE UP (ref 25–170)
CO2 SERPL-SCNC: <6 MMOL/L — CRITICAL LOW (ref 22–29)
CREAT SERPL-MCNC: 1.73 MG/DL — HIGH (ref 0.5–1.3)
EOSINOPHIL # BLD AUTO: 0.01 K/UL — SIGNIFICANT CHANGE UP (ref 0–0.5)
EOSINOPHIL NFR BLD AUTO: 0.1 % — SIGNIFICANT CHANGE UP (ref 0–6)
GAS PNL BLDV: 134 MMOL/L — LOW (ref 136–145)
GAS PNL BLDV: SIGNIFICANT CHANGE UP
GAS PNL BLDV: SIGNIFICANT CHANGE UP
GLUCOSE BLDV-MCNC: >656 MG/DL — CRITICAL HIGH (ref 70–99)
GLUCOSE SERPL-MCNC: 676 MG/DL — CRITICAL HIGH (ref 70–99)
HCG SERPL-ACNC: <4 MIU/ML — SIGNIFICANT CHANGE UP
HCT VFR BLD CALC: 56.3 % — HIGH (ref 34.5–45)
HCT VFR BLDA CALC: 53 % — HIGH (ref 34–46)
HGB BLD CALC-MCNC: 17.5 G/DL — HIGH (ref 11.7–16.1)
HGB BLD-MCNC: 17.1 G/DL — HIGH (ref 11.5–15.5)
IMM GRANULOCYTES NFR BLD AUTO: 1.2 % — SIGNIFICANT CHANGE UP (ref 0–1.5)
INR BLD: 0.95 RATIO — SIGNIFICANT CHANGE UP (ref 0.88–1.16)
LACTATE BLDV-MCNC: 4.3 MMOL/L — CRITICAL HIGH (ref 0.5–2)
LYMPHOCYTES # BLD AUTO: 1.7 K/UL — SIGNIFICANT CHANGE UP (ref 1–3.3)
LYMPHOCYTES # BLD AUTO: 11.5 % — LOW (ref 13–44)
MCHC RBC-ENTMCNC: 30.4 GM/DL — LOW (ref 32–36)
MCHC RBC-ENTMCNC: 30.4 PG — SIGNIFICANT CHANGE UP (ref 27–34)
MCV RBC AUTO: 100.2 FL — HIGH (ref 80–100)
MONOCYTES # BLD AUTO: 1.05 K/UL — HIGH (ref 0–0.9)
MONOCYTES NFR BLD AUTO: 7.1 % — SIGNIFICANT CHANGE UP (ref 2–14)
NEUTROPHILS # BLD AUTO: 11.73 K/UL — HIGH (ref 1.8–7.4)
NEUTROPHILS NFR BLD AUTO: 79.4 % — HIGH (ref 43–77)
NT-PROBNP SERPL-SCNC: 87 PG/ML — SIGNIFICANT CHANGE UP (ref 0–300)
PCO2 BLDV: 41 MMHG — SIGNIFICANT CHANGE UP (ref 39–42)
PH BLDV: <6.942 — CRITICAL LOW (ref 7.32–7.43)
PLATELET # BLD AUTO: 237 K/UL — SIGNIFICANT CHANGE UP (ref 150–400)
PO2 BLDV: 75 MMHG — HIGH (ref 25–45)
POTASSIUM BLDV-SCNC: 5.7 MMOL/L — HIGH (ref 3.5–5.1)
POTASSIUM SERPL-MCNC: 5.7 MMOL/L — HIGH (ref 3.5–5.3)
POTASSIUM SERPL-SCNC: 5.7 MMOL/L — HIGH (ref 3.5–5.3)
PROT SERPL-MCNC: 8.1 G/DL — SIGNIFICANT CHANGE UP (ref 6.6–8.7)
PROTHROM AB SERPL-ACNC: 11.1 SEC — SIGNIFICANT CHANGE UP (ref 10.6–13.6)
RBC # BLD: 5.62 M/UL — HIGH (ref 3.8–5.2)
RBC # FLD: 14.6 % — HIGH (ref 10.3–14.5)
SAO2 % BLDV: 93.1 % — SIGNIFICANT CHANGE UP
SODIUM SERPL-SCNC: 136 MMOL/L — SIGNIFICANT CHANGE UP (ref 135–145)
TROPONIN T SERPL-MCNC: <0.01 NG/ML — SIGNIFICANT CHANGE UP (ref 0–0.06)
WBC # BLD: 14.77 K/UL — HIGH (ref 3.8–10.5)
WBC # FLD AUTO: 14.77 K/UL — HIGH (ref 3.8–10.5)

## 2021-09-07 PROCEDURE — 71250 CT THORAX DX C-: CPT | Mod: 26,MD

## 2021-09-07 PROCEDURE — 71045 X-RAY EXAM CHEST 1 VIEW: CPT | Mod: 26,76

## 2021-09-07 PROCEDURE — 72125 CT NECK SPINE W/O DYE: CPT | Mod: 26

## 2021-09-07 PROCEDURE — 70450 CT HEAD/BRAIN W/O DYE: CPT | Mod: 26

## 2021-09-07 PROCEDURE — 99291 CRITICAL CARE FIRST HOUR: CPT | Mod: 25

## 2021-09-07 PROCEDURE — 31500 INSERT EMERGENCY AIRWAY: CPT

## 2021-09-07 RX ORDER — ETOMIDATE 2 MG/ML
20 INJECTION INTRAVENOUS ONCE
Refills: 0 | Status: COMPLETED | OUTPATIENT
Start: 2021-09-07 | End: 2021-09-07

## 2021-09-07 RX ORDER — SODIUM CHLORIDE 9 MG/ML
1000 INJECTION, SOLUTION INTRAVENOUS ONCE
Refills: 0 | Status: COMPLETED | OUTPATIENT
Start: 2021-09-07 | End: 2021-09-07

## 2021-09-07 RX ORDER — SUCCINYLCHOLINE CHLORIDE 100 MG/5ML
100 SYRINGE (ML) INTRAVENOUS ONCE
Refills: 0 | Status: COMPLETED | OUTPATIENT
Start: 2021-09-07 | End: 2021-09-07

## 2021-09-07 RX ORDER — PROPOFOL 10 MG/ML
5 INJECTION, EMULSION INTRAVENOUS
Qty: 1000 | Refills: 0 | Status: DISCONTINUED | OUTPATIENT
Start: 2021-09-07 | End: 2021-09-09

## 2021-09-07 RX ORDER — INSULIN HUMAN 100 [IU]/ML
11 INJECTION, SOLUTION SUBCUTANEOUS
Qty: 100 | Refills: 0 | Status: DISCONTINUED | OUTPATIENT
Start: 2021-09-07 | End: 2021-09-09

## 2021-09-07 RX ADMIN — Medication 100 MILLIGRAM(S): at 22:24

## 2021-09-07 RX ADMIN — PROPOFOL 3.6 MICROGRAM(S)/KG/MIN: 10 INJECTION, EMULSION INTRAVENOUS at 22:47

## 2021-09-07 RX ADMIN — ETOMIDATE 20 MILLIGRAM(S): 2 INJECTION INTRAVENOUS at 22:24

## 2021-09-07 NOTE — ED PROVIDER NOTE - CLINICAL SUMMARY MEDICAL DECISION MAKING FREE TEXT BOX
Patient coming in found down last known well 2 days ago, as per EMS found by son on ground unresponsive and hypoxic EMS attempted intubation in the field and were unsuccessful. Patient unresponsive upon arrival requiring immediate airway protection. CT head to rule out traumatic injury, fingerstick over 500 evaluate for DKA. Labs, cultures, fluids, abx, and MICU admission.

## 2021-09-07 NOTE — ED PROVIDER NOTE - OBJECTIVE STATEMENT
55 y/o female with no PMHx presents to ED BIBA in respiratory distress. As per EMS, family last saw patient 2 days ago and she was complaining of a headache, which patient is known to have but not as severe as this episode. Tonight, patient was found on the floor next to her bed unresponsive. As per EMS, patient has a BGL of 444, and intubation was attempted but EMS was unsuccessful. Patient was being ventilated upon arrival in ED. In ED, patient is unresponsive and was brought straight to critical care. 55 y/o female with no PMHx presents to ED BIBA in respiratory distress. As per EMS, family last saw patient 2 days ago and she was complaining of a headache, which patient is known to have but not as severe as this episode. Tonight, patient was found on the floor next to her bed unresponsive. As per EMS, patient has a BGL of 444, had urinary incontinence, and intubation was attempted but EMS was unsuccessful. Patient was being ventilated upon arrival in ED. In ED, patient is unresponsive and was brought straight to critical care.

## 2021-09-07 NOTE — ED PROVIDER NOTE - PROGRESS NOTE DETAILS
Stefano AIKEN for ED attending, Dr. Casper. Son Robert at bedside reports patient had a headache earlier this morning, has been urinating frequently, does not take any medications. son updated on status, pending MICU evaluation for admission. Dominick Guerra [phone]

## 2021-09-07 NOTE — ED ADULT TRIAGE NOTE - CHIEF COMPLAINT QUOTE
Pt BIBEMS after being found next to her bed unresponsive with periods of apnea. As per EMS patient was last seen 2 days ago and was c/o a headache at the time. Patient is cool and clammy on arrival with a GCS of 7. Patient taken to critical care bay with MD at bedside.

## 2021-09-07 NOTE — ED PROVIDER NOTE - PHYSICAL EXAMINATION
Vital Signs per nursing documentation  Gen: Obese, being bagged  HEENT: NCAT, MMM, pupils 3mm and reactive  Cardiac: Tachycardic, normal S1S2  Chest: clear to auscultation bilateral, no wheezes or crackles  Abdomen: soft, obese, non tender non distended  Extremity: no gross deformity, good perfusion  Skin: no bruising  Neuro: unresponsive

## 2021-09-08 DIAGNOSIS — E11.10 TYPE 2 DIABETES MELLITUS WITH KETOACIDOSIS WITHOUT COMA: ICD-10-CM

## 2021-09-08 LAB
ALBUMIN SERPL ELPH-MCNC: 3.5 G/DL — SIGNIFICANT CHANGE UP (ref 3.3–5.2)
ALBUMIN SERPL ELPH-MCNC: 3.5 G/DL — SIGNIFICANT CHANGE UP (ref 3.3–5.2)
ALP SERPL-CCNC: 102 U/L — SIGNIFICANT CHANGE UP (ref 40–120)
ALP SERPL-CCNC: 108 U/L — SIGNIFICANT CHANGE UP (ref 40–120)
ALT FLD-CCNC: 16 U/L — SIGNIFICANT CHANGE UP
ALT FLD-CCNC: 18 U/L — SIGNIFICANT CHANGE UP
AMPHET UR-MCNC: NEGATIVE — SIGNIFICANT CHANGE UP
ANION GAP SERPL CALC-SCNC: 20 MMOL/L — HIGH (ref 5–17)
ANION GAP SERPL CALC-SCNC: 24 MMOL/L — HIGH (ref 5–17)
ANION GAP SERPL CALC-SCNC: 24 MMOL/L — HIGH (ref 5–17)
ANION GAP SERPL CALC-SCNC: SIGNIFICANT CHANGE UP MMOL/L (ref 5–17)
APAP SERPL-MCNC: <3 UG/ML — LOW (ref 10–26)
APPEARANCE UR: CLEAR — SIGNIFICANT CHANGE UP
AST SERPL-CCNC: 22 U/L — SIGNIFICANT CHANGE UP
AST SERPL-CCNC: 27 U/L — SIGNIFICANT CHANGE UP
B-OH-BUTYR SERPL-SCNC: 12.2 MMOL/L — HIGH
BARBITURATES UR SCN-MCNC: NEGATIVE — SIGNIFICANT CHANGE UP
BASE EXCESS BLDV CALC-SCNC: -11.3 MMOL/L — LOW (ref -2–3)
BASE EXCESS BLDV CALC-SCNC: -15.4 MMOL/L — LOW (ref -2–3)
BASOPHILS # BLD AUTO: 0.05 K/UL — SIGNIFICANT CHANGE UP (ref 0–0.2)
BASOPHILS NFR BLD AUTO: 0.3 % — SIGNIFICANT CHANGE UP (ref 0–2)
BENZODIAZ UR-MCNC: NEGATIVE — SIGNIFICANT CHANGE UP
BILIRUB SERPL-MCNC: 0.3 MG/DL — LOW (ref 0.4–2)
BILIRUB SERPL-MCNC: 0.3 MG/DL — LOW (ref 0.4–2)
BILIRUB UR-MCNC: NEGATIVE — SIGNIFICANT CHANGE UP
BLD GP AB SCN SERPL QL: SIGNIFICANT CHANGE UP
BUN SERPL-MCNC: 23.1 MG/DL — HIGH (ref 8–20)
BUN SERPL-MCNC: 24.4 MG/DL — HIGH (ref 8–20)
BUN SERPL-MCNC: 24.9 MG/DL — HIGH (ref 8–20)
BUN SERPL-MCNC: 25.7 MG/DL — HIGH (ref 8–20)
BUN SERPL-MCNC: 26 MG/DL — HIGH (ref 8–20)
BUN SERPL-MCNC: 26.5 MG/DL — HIGH (ref 8–20)
CA-I SERPL-SCNC: 1.07 MMOL/L — LOW (ref 1.15–1.33)
CA-I SERPL-SCNC: 1.32 MMOL/L — SIGNIFICANT CHANGE UP (ref 1.15–1.33)
CALCIUM SERPL-MCNC: 8.5 MG/DL — LOW (ref 8.6–10.2)
CALCIUM SERPL-MCNC: 8.6 MG/DL — SIGNIFICANT CHANGE UP (ref 8.6–10.2)
CALCIUM SERPL-MCNC: 8.9 MG/DL — SIGNIFICANT CHANGE UP (ref 8.6–10.2)
CALCIUM SERPL-MCNC: 9.2 MG/DL — SIGNIFICANT CHANGE UP (ref 8.6–10.2)
CALCIUM SERPL-MCNC: 9.3 MG/DL — SIGNIFICANT CHANGE UP (ref 8.6–10.2)
CALCIUM SERPL-MCNC: 9.7 MG/DL — SIGNIFICANT CHANGE UP (ref 8.6–10.2)
CHLORIDE BLDV-SCNC: 110 MMOL/L — HIGH (ref 98–107)
CHLORIDE BLDV-SCNC: 119 MMOL/L — HIGH (ref 98–107)
CHLORIDE SERPL-SCNC: 103 MMOL/L — SIGNIFICANT CHANGE UP (ref 98–107)
CHLORIDE SERPL-SCNC: 104 MMOL/L — SIGNIFICANT CHANGE UP (ref 98–107)
CHLORIDE SERPL-SCNC: 108 MMOL/L — HIGH (ref 98–107)
CHLORIDE SERPL-SCNC: 108 MMOL/L — HIGH (ref 98–107)
CHLORIDE SERPL-SCNC: 112 MMOL/L — HIGH (ref 98–107)
CHLORIDE SERPL-SCNC: 99 MMOL/L — SIGNIFICANT CHANGE UP (ref 98–107)
CK SERPL-CCNC: 106 U/L — SIGNIFICANT CHANGE UP (ref 25–170)
CK SERPL-CCNC: 115 U/L — SIGNIFICANT CHANGE UP (ref 25–170)
CO2 SERPL-SCNC: 10 MMOL/L — CRITICAL LOW (ref 22–29)
CO2 SERPL-SCNC: 12 MMOL/L — LOW (ref 22–29)
CO2 SERPL-SCNC: 12 MMOL/L — LOW (ref 22–29)
CO2 SERPL-SCNC: <6 MMOL/L — CRITICAL LOW (ref 22–29)
COCAINE METAB.OTHER UR-MCNC: NEGATIVE — SIGNIFICANT CHANGE UP
COLOR SPEC: YELLOW — SIGNIFICANT CHANGE UP
CREAT SERPL-MCNC: 1.67 MG/DL — HIGH (ref 0.5–1.3)
CREAT SERPL-MCNC: 1.7 MG/DL — HIGH (ref 0.5–1.3)
CREAT SERPL-MCNC: 1.75 MG/DL — HIGH (ref 0.5–1.3)
CREAT SERPL-MCNC: 1.76 MG/DL — HIGH (ref 0.5–1.3)
CREAT SERPL-MCNC: 1.82 MG/DL — HIGH (ref 0.5–1.3)
CREAT SERPL-MCNC: 1.89 MG/DL — HIGH (ref 0.5–1.3)
CULTURE RESULTS: NO GROWTH — SIGNIFICANT CHANGE UP
DIFF PNL FLD: ABNORMAL
EOSINOPHIL # BLD AUTO: 0 K/UL — SIGNIFICANT CHANGE UP (ref 0–0.5)
EOSINOPHIL NFR BLD AUTO: 0 % — SIGNIFICANT CHANGE UP (ref 0–6)
EPI CELLS # UR: SIGNIFICANT CHANGE UP
ETHANOL SERPL-MCNC: <10 MG/DL — SIGNIFICANT CHANGE UP (ref 0–9)
GAS PNL BLDA: SIGNIFICANT CHANGE UP
GAS PNL BLDV: 141 MMOL/L — SIGNIFICANT CHANGE UP (ref 136–145)
GAS PNL BLDV: 141 MMOL/L — SIGNIFICANT CHANGE UP (ref 136–145)
GAS PNL BLDV: SIGNIFICANT CHANGE UP
GLUCOSE BLDC GLUCOMTR-MCNC: 146 MG/DL — HIGH (ref 70–99)
GLUCOSE BLDC GLUCOMTR-MCNC: 165 MG/DL — HIGH (ref 70–99)
GLUCOSE BLDC GLUCOMTR-MCNC: 175 MG/DL — HIGH (ref 70–99)
GLUCOSE BLDC GLUCOMTR-MCNC: 184 MG/DL — HIGH (ref 70–99)
GLUCOSE BLDC GLUCOMTR-MCNC: 193 MG/DL — HIGH (ref 70–99)
GLUCOSE BLDC GLUCOMTR-MCNC: 194 MG/DL — HIGH (ref 70–99)
GLUCOSE BLDC GLUCOMTR-MCNC: 200 MG/DL — HIGH (ref 70–99)
GLUCOSE BLDC GLUCOMTR-MCNC: 215 MG/DL — HIGH (ref 70–99)
GLUCOSE BLDC GLUCOMTR-MCNC: 225 MG/DL — HIGH (ref 70–99)
GLUCOSE BLDC GLUCOMTR-MCNC: 240 MG/DL — HIGH (ref 70–99)
GLUCOSE BLDC GLUCOMTR-MCNC: 251 MG/DL — HIGH (ref 70–99)
GLUCOSE BLDC GLUCOMTR-MCNC: 259 MG/DL — HIGH (ref 70–99)
GLUCOSE BLDC GLUCOMTR-MCNC: 278 MG/DL — HIGH (ref 70–99)
GLUCOSE BLDC GLUCOMTR-MCNC: 322 MG/DL — HIGH (ref 70–99)
GLUCOSE BLDC GLUCOMTR-MCNC: 337 MG/DL — HIGH (ref 70–99)
GLUCOSE BLDC GLUCOMTR-MCNC: 367 MG/DL — HIGH (ref 70–99)
GLUCOSE BLDC GLUCOMTR-MCNC: 393 MG/DL — HIGH (ref 70–99)
GLUCOSE BLDC GLUCOMTR-MCNC: 423 MG/DL — HIGH (ref 70–99)
GLUCOSE BLDC GLUCOMTR-MCNC: 434 MG/DL — HIGH (ref 70–99)
GLUCOSE BLDC GLUCOMTR-MCNC: 522 MG/DL — CRITICAL HIGH (ref 70–99)
GLUCOSE BLDC GLUCOMTR-MCNC: >530 MG/DL — CRITICAL HIGH (ref 70–99)
GLUCOSE BLDC GLUCOMTR-MCNC: >530 MG/DL — CRITICAL HIGH (ref 70–99)
GLUCOSE BLDV-MCNC: 212 MG/DL — HIGH (ref 70–99)
GLUCOSE SERPL-MCNC: 179 MG/DL — HIGH (ref 70–99)
GLUCOSE SERPL-MCNC: 201 MG/DL — HIGH (ref 70–99)
GLUCOSE SERPL-MCNC: 244 MG/DL — HIGH (ref 70–99)
GLUCOSE SERPL-MCNC: 346 MG/DL — HIGH (ref 70–99)
GLUCOSE SERPL-MCNC: 458 MG/DL — HIGH (ref 70–99)
GLUCOSE SERPL-MCNC: 659 MG/DL — CRITICAL HIGH (ref 70–99)
GLUCOSE UR QL: 1000 MG/DL
HCO3 BLDV-SCNC: 11 MMOL/L — LOW (ref 22–29)
HCO3 BLDV-SCNC: 16 MMOL/L — LOW (ref 22–29)
HCT VFR BLD CALC: 46.4 % — HIGH (ref 34.5–45)
HCT VFR BLDA CALC: 44 % — SIGNIFICANT CHANGE UP (ref 34–46)
HGB BLD CALC-MCNC: 14.5 G/DL — SIGNIFICANT CHANGE UP (ref 11.7–16.1)
HGB BLD-MCNC: 14.9 G/DL — SIGNIFICANT CHANGE UP (ref 11.5–15.5)
IMM GRANULOCYTES NFR BLD AUTO: 0.9 % — SIGNIFICANT CHANGE UP (ref 0–1.5)
KETONES UR-MCNC: ABNORMAL
LACTATE BLDV-MCNC: 2.3 MMOL/L — HIGH (ref 0.5–2)
LACTATE BLDV-MCNC: 2.9 MMOL/L — HIGH (ref 0.5–2)
LEUKOCYTE ESTERASE UR-ACNC: NEGATIVE — SIGNIFICANT CHANGE UP
LYMPHOCYTES # BLD AUTO: 26 % — SIGNIFICANT CHANGE UP (ref 13–44)
LYMPHOCYTES # BLD AUTO: 4.04 K/UL — HIGH (ref 1–3.3)
MAGNESIUM SERPL-MCNC: 2 MG/DL — SIGNIFICANT CHANGE UP (ref 1.6–2.6)
MAGNESIUM SERPL-MCNC: 2.1 MG/DL — SIGNIFICANT CHANGE UP (ref 1.8–2.6)
MAGNESIUM SERPL-MCNC: 2.2 MG/DL — SIGNIFICANT CHANGE UP (ref 1.8–2.6)
MAGNESIUM SERPL-MCNC: 2.3 MG/DL — SIGNIFICANT CHANGE UP (ref 1.6–2.6)
MAGNESIUM SERPL-MCNC: 2.5 MG/DL — SIGNIFICANT CHANGE UP (ref 1.6–2.6)
MAGNESIUM SERPL-MCNC: 2.6 MG/DL — SIGNIFICANT CHANGE UP (ref 1.6–2.6)
MCHC RBC-ENTMCNC: 31.4 PG — SIGNIFICANT CHANGE UP (ref 27–34)
MCHC RBC-ENTMCNC: 32.1 GM/DL — SIGNIFICANT CHANGE UP (ref 32–36)
MCV RBC AUTO: 97.9 FL — SIGNIFICANT CHANGE UP (ref 80–100)
METHADONE UR-MCNC: NEGATIVE — SIGNIFICANT CHANGE UP
MONOCYTES # BLD AUTO: 0.84 K/UL — SIGNIFICANT CHANGE UP (ref 0–0.9)
MONOCYTES NFR BLD AUTO: 5.4 % — SIGNIFICANT CHANGE UP (ref 2–14)
NEUTROPHILS # BLD AUTO: 10.45 K/UL — HIGH (ref 1.8–7.4)
NEUTROPHILS NFR BLD AUTO: 67.4 % — SIGNIFICANT CHANGE UP (ref 43–77)
NITRITE UR-MCNC: NEGATIVE — SIGNIFICANT CHANGE UP
OPIATES UR-MCNC: NEGATIVE — SIGNIFICANT CHANGE UP
PCO2 BLDV: 24 MMHG — LOW (ref 39–42)
PCO2 BLDV: 34 MMHG — LOW (ref 39–42)
PCP SPEC-MCNC: SIGNIFICANT CHANGE UP
PCP UR-MCNC: NEGATIVE — SIGNIFICANT CHANGE UP
PH BLDV: 7.27 — LOW (ref 7.32–7.43)
PH BLDV: 7.28 — LOW (ref 7.32–7.43)
PH UR: 5 — SIGNIFICANT CHANGE UP (ref 5–8)
PHOSPHATE SERPL-MCNC: 0.9 MG/DL — CRITICAL LOW (ref 2.4–4.7)
PHOSPHATE SERPL-MCNC: 1.2 MG/DL — LOW (ref 2.4–4.7)
PHOSPHATE SERPL-MCNC: 1.4 MG/DL — LOW (ref 2.4–4.7)
PHOSPHATE SERPL-MCNC: 1.7 MG/DL — LOW (ref 2.4–4.7)
PHOSPHATE SERPL-MCNC: 3.9 MG/DL — SIGNIFICANT CHANGE UP (ref 2.4–4.7)
PHOSPHATE SERPL-MCNC: 6.1 MG/DL — HIGH (ref 2.4–4.7)
PLATELET # BLD AUTO: 194 K/UL — SIGNIFICANT CHANGE UP (ref 150–400)
PO2 BLDV: 51 MMHG — HIGH (ref 25–45)
POTASSIUM BLDV-SCNC: 3.5 MMOL/L — SIGNIFICANT CHANGE UP (ref 3.5–5.1)
POTASSIUM BLDV-SCNC: 4 MMOL/L — SIGNIFICANT CHANGE UP (ref 3.5–5.1)
POTASSIUM SERPL-MCNC: 3.4 MMOL/L — LOW (ref 3.5–5.3)
POTASSIUM SERPL-MCNC: 3.6 MMOL/L — SIGNIFICANT CHANGE UP (ref 3.5–5.3)
POTASSIUM SERPL-MCNC: 4.2 MMOL/L — SIGNIFICANT CHANGE UP (ref 3.5–5.3)
POTASSIUM SERPL-MCNC: 4.3 MMOL/L — SIGNIFICANT CHANGE UP (ref 3.5–5.3)
POTASSIUM SERPL-MCNC: 5 MMOL/L — SIGNIFICANT CHANGE UP (ref 3.5–5.3)
POTASSIUM SERPL-MCNC: 5.5 MMOL/L — HIGH (ref 3.5–5.3)
POTASSIUM SERPL-SCNC: 3.4 MMOL/L — LOW (ref 3.5–5.3)
POTASSIUM SERPL-SCNC: 3.6 MMOL/L — SIGNIFICANT CHANGE UP (ref 3.5–5.3)
POTASSIUM SERPL-SCNC: 4.2 MMOL/L — SIGNIFICANT CHANGE UP (ref 3.5–5.3)
POTASSIUM SERPL-SCNC: 4.3 MMOL/L — SIGNIFICANT CHANGE UP (ref 3.5–5.3)
POTASSIUM SERPL-SCNC: 5 MMOL/L — SIGNIFICANT CHANGE UP (ref 3.5–5.3)
POTASSIUM SERPL-SCNC: 5.5 MMOL/L — HIGH (ref 3.5–5.3)
PROT SERPL-MCNC: 6.3 G/DL — LOW (ref 6.6–8.7)
PROT SERPL-MCNC: 6.7 G/DL — SIGNIFICANT CHANGE UP (ref 6.6–8.7)
PROT UR-MCNC: 100 MG/DL
RAPID RVP RESULT: SIGNIFICANT CHANGE UP
RBC # BLD: 4.74 M/UL — SIGNIFICANT CHANGE UP (ref 3.8–5.2)
RBC # FLD: 14.6 % — HIGH (ref 10.3–14.5)
RBC CASTS # UR COMP ASSIST: ABNORMAL /HPF (ref 0–4)
SALICYLATES SERPL-MCNC: <0.6 MG/DL — LOW (ref 10–20)
SAO2 % BLDV: 50.8 % — SIGNIFICANT CHANGE UP
SARS-COV-2 RNA SPEC QL NAA+PROBE: SIGNIFICANT CHANGE UP
SODIUM SERPL-SCNC: 137 MMOL/L — SIGNIFICANT CHANGE UP (ref 135–145)
SODIUM SERPL-SCNC: 137 MMOL/L — SIGNIFICANT CHANGE UP (ref 135–145)
SODIUM SERPL-SCNC: 140 MMOL/L — SIGNIFICANT CHANGE UP (ref 135–145)
SODIUM SERPL-SCNC: 141 MMOL/L — SIGNIFICANT CHANGE UP (ref 135–145)
SODIUM SERPL-SCNC: 144 MMOL/L — SIGNIFICANT CHANGE UP (ref 135–145)
SODIUM SERPL-SCNC: 144 MMOL/L — SIGNIFICANT CHANGE UP (ref 135–145)
SP GR SPEC: 1.02 — SIGNIFICANT CHANGE UP (ref 1.01–1.02)
SPECIMEN SOURCE: SIGNIFICANT CHANGE UP
THC UR QL: NEGATIVE — SIGNIFICANT CHANGE UP
TROPONIN T SERPL-MCNC: 0.03 NG/ML — SIGNIFICANT CHANGE UP (ref 0–0.06)
TSH SERPL-MCNC: 0.5 UIU/ML — SIGNIFICANT CHANGE UP (ref 0.27–4.2)
UROBILINOGEN FLD QL: NEGATIVE MG/DL — SIGNIFICANT CHANGE UP
WBC # BLD: 15.52 K/UL — HIGH (ref 3.8–10.5)
WBC # FLD AUTO: 15.52 K/UL — HIGH (ref 3.8–10.5)
WBC UR QL: NEGATIVE — SIGNIFICANT CHANGE UP

## 2021-09-08 PROCEDURE — 99222 1ST HOSP IP/OBS MODERATE 55: CPT

## 2021-09-08 PROCEDURE — 71045 X-RAY EXAM CHEST 1 VIEW: CPT | Mod: 26

## 2021-09-08 PROCEDURE — 95720 EEG PHY/QHP EA INCR W/VEEG: CPT

## 2021-09-08 PROCEDURE — 93010 ELECTROCARDIOGRAM REPORT: CPT

## 2021-09-08 RX ORDER — CHLORHEXIDINE GLUCONATE 213 G/1000ML
1 SOLUTION TOPICAL
Refills: 0 | Status: DISCONTINUED | OUTPATIENT
Start: 2021-09-08 | End: 2021-09-14

## 2021-09-08 RX ORDER — POTASSIUM PHOSPHATE, MONOBASIC POTASSIUM PHOSPHATE, DIBASIC 236; 224 MG/ML; MG/ML
30 INJECTION, SOLUTION INTRAVENOUS ONCE
Refills: 0 | Status: COMPLETED | OUTPATIENT
Start: 2021-09-08 | End: 2021-09-08

## 2021-09-08 RX ORDER — SODIUM CHLORIDE 9 MG/ML
1000 INJECTION, SOLUTION INTRAVENOUS
Refills: 0 | Status: DISCONTINUED | OUTPATIENT
Start: 2021-09-08 | End: 2021-09-08

## 2021-09-08 RX ORDER — SODIUM BICARBONATE 1 MEQ/ML
50 SYRINGE (ML) INTRAVENOUS ONCE
Refills: 0 | Status: COMPLETED | OUTPATIENT
Start: 2021-09-08 | End: 2021-09-08

## 2021-09-08 RX ORDER — SODIUM CHLORIDE 9 MG/ML
2000 INJECTION, SOLUTION INTRAVENOUS ONCE
Refills: 0 | Status: COMPLETED | OUTPATIENT
Start: 2021-09-08 | End: 2021-09-08

## 2021-09-08 RX ORDER — CHLORHEXIDINE GLUCONATE 213 G/1000ML
15 SOLUTION TOPICAL EVERY 12 HOURS
Refills: 0 | Status: DISCONTINUED | OUTPATIENT
Start: 2021-09-08 | End: 2021-09-09

## 2021-09-08 RX ORDER — LEVETIRACETAM 250 MG/1
500 TABLET, FILM COATED ORAL EVERY 12 HOURS
Refills: 0 | Status: DISCONTINUED | OUTPATIENT
Start: 2021-09-08 | End: 2021-09-09

## 2021-09-08 RX ORDER — LEVETIRACETAM 250 MG/1
1000 TABLET, FILM COATED ORAL EVERY 12 HOURS
Refills: 0 | Status: DISCONTINUED | OUTPATIENT
Start: 2021-09-08 | End: 2021-09-08

## 2021-09-08 RX ORDER — DEXTROSE MONOHYDRATE, SODIUM CHLORIDE, AND POTASSIUM CHLORIDE 50; .745; 4.5 G/1000ML; G/1000ML; G/1000ML
1000 INJECTION, SOLUTION INTRAVENOUS
Refills: 0 | Status: DISCONTINUED | OUTPATIENT
Start: 2021-09-08 | End: 2021-09-09

## 2021-09-08 RX ORDER — PANTOPRAZOLE SODIUM 20 MG/1
40 TABLET, DELAYED RELEASE ORAL DAILY
Refills: 0 | Status: DISCONTINUED | OUTPATIENT
Start: 2021-09-08 | End: 2021-09-09

## 2021-09-08 RX ORDER — PIPERACILLIN AND TAZOBACTAM 4; .5 G/20ML; G/20ML
3.38 INJECTION, POWDER, LYOPHILIZED, FOR SOLUTION INTRAVENOUS ONCE
Refills: 0 | Status: COMPLETED | OUTPATIENT
Start: 2021-09-08 | End: 2021-09-08

## 2021-09-08 RX ORDER — POTASSIUM CHLORIDE 20 MEQ
10 PACKET (EA) ORAL
Refills: 0 | Status: COMPLETED | OUTPATIENT
Start: 2021-09-08 | End: 2021-09-08

## 2021-09-08 RX ORDER — NYSTATIN CREAM 100000 [USP'U]/G
1 CREAM TOPICAL
Refills: 0 | Status: DISCONTINUED | OUTPATIENT
Start: 2021-09-08 | End: 2021-09-27

## 2021-09-08 RX ORDER — MIDAZOLAM HYDROCHLORIDE 1 MG/ML
0.02 INJECTION, SOLUTION INTRAMUSCULAR; INTRAVENOUS
Qty: 100 | Refills: 0 | Status: DISCONTINUED | OUTPATIENT
Start: 2021-09-08 | End: 2021-09-08

## 2021-09-08 RX ORDER — LEVETIRACETAM 250 MG/1
500 TABLET, FILM COATED ORAL EVERY 12 HOURS
Refills: 0 | Status: DISCONTINUED | OUTPATIENT
Start: 2021-09-08 | End: 2021-09-08

## 2021-09-08 RX ORDER — MIDAZOLAM HYDROCHLORIDE 1 MG/ML
4 INJECTION, SOLUTION INTRAMUSCULAR; INTRAVENOUS ONCE
Refills: 0 | Status: DISCONTINUED | OUTPATIENT
Start: 2021-09-08 | End: 2021-09-08

## 2021-09-08 RX ORDER — VANCOMYCIN HCL 1 G
1000 VIAL (EA) INTRAVENOUS ONCE
Refills: 0 | Status: COMPLETED | OUTPATIENT
Start: 2021-09-08 | End: 2021-09-08

## 2021-09-08 RX ORDER — CEFEPIME 1 G/1
2000 INJECTION, POWDER, FOR SOLUTION INTRAMUSCULAR; INTRAVENOUS ONCE
Refills: 0 | Status: COMPLETED | OUTPATIENT
Start: 2021-09-08 | End: 2021-09-08

## 2021-09-08 RX ORDER — SODIUM BICARBONATE 1 MEQ/ML
100 SYRINGE (ML) INTRAVENOUS ONCE
Refills: 0 | Status: COMPLETED | OUTPATIENT
Start: 2021-09-08 | End: 2021-09-08

## 2021-09-08 RX ORDER — HEPARIN SODIUM 5000 [USP'U]/ML
5000 INJECTION INTRAVENOUS; SUBCUTANEOUS EVERY 8 HOURS
Refills: 0 | Status: DISCONTINUED | OUTPATIENT
Start: 2021-09-08 | End: 2021-09-20

## 2021-09-08 RX ORDER — MIDAZOLAM HYDROCHLORIDE 1 MG/ML
0.05 INJECTION, SOLUTION INTRAMUSCULAR; INTRAVENOUS
Qty: 1250 | Refills: 0 | Status: DISCONTINUED | OUTPATIENT
Start: 2021-09-08 | End: 2021-09-08

## 2021-09-08 RX ADMIN — SODIUM CHLORIDE 150 MILLILITER(S): 9 INJECTION, SOLUTION INTRAVENOUS at 07:06

## 2021-09-08 RX ADMIN — SODIUM CHLORIDE 1000 MILLILITER(S): 9 INJECTION, SOLUTION INTRAVENOUS at 00:00

## 2021-09-08 RX ADMIN — Medication 250 MILLIGRAM(S): at 01:47

## 2021-09-08 RX ADMIN — PROPOFOL 3.6 MICROGRAM(S)/KG/MIN: 10 INJECTION, EMULSION INTRAVENOUS at 09:38

## 2021-09-08 RX ADMIN — SODIUM CHLORIDE 125 MILLILITER(S): 9 INJECTION, SOLUTION INTRAVENOUS at 03:48

## 2021-09-08 RX ADMIN — INSULIN HUMAN 11 UNIT(S)/HR: 100 INJECTION, SOLUTION SUBCUTANEOUS at 00:50

## 2021-09-08 RX ADMIN — MIDAZOLAM HYDROCHLORIDE 2.27 MG/KG/HR: 1 INJECTION, SOLUTION INTRAMUSCULAR; INTRAVENOUS at 03:47

## 2021-09-08 RX ADMIN — INSULIN HUMAN 11 UNIT(S)/HR: 100 INJECTION, SOLUTION SUBCUTANEOUS at 03:47

## 2021-09-08 RX ADMIN — SODIUM CHLORIDE 200 MILLILITER(S): 9 INJECTION, SOLUTION INTRAVENOUS at 12:49

## 2021-09-08 RX ADMIN — NYSTATIN CREAM 1 APPLICATION(S): 100000 CREAM TOPICAL at 05:04

## 2021-09-08 RX ADMIN — Medication 100 MILLIEQUIVALENT(S): at 06:13

## 2021-09-08 RX ADMIN — HEPARIN SODIUM 5000 UNIT(S): 5000 INJECTION INTRAVENOUS; SUBCUTANEOUS at 13:57

## 2021-09-08 RX ADMIN — PIPERACILLIN AND TAZOBACTAM 25 GRAM(S): 4; .5 INJECTION, POWDER, LYOPHILIZED, FOR SOLUTION INTRAVENOUS at 11:47

## 2021-09-08 RX ADMIN — POTASSIUM PHOSPHATE, MONOBASIC POTASSIUM PHOSPHATE, DIBASIC 83.33 MILLIMOLE(S): 236; 224 INJECTION, SOLUTION INTRAVENOUS at 22:05

## 2021-09-08 RX ADMIN — Medication 100 MILLIEQUIVALENT(S): at 07:05

## 2021-09-08 RX ADMIN — HEPARIN SODIUM 5000 UNIT(S): 5000 INJECTION INTRAVENOUS; SUBCUTANEOUS at 06:12

## 2021-09-08 RX ADMIN — Medication 85 MILLIMOLE(S): at 15:38

## 2021-09-08 RX ADMIN — MIDAZOLAM HYDROCHLORIDE 4 MILLIGRAM(S): 1 INJECTION, SOLUTION INTRAMUSCULAR; INTRAVENOUS at 03:50

## 2021-09-08 RX ADMIN — CEFEPIME 100 MILLIGRAM(S): 1 INJECTION, POWDER, FOR SOLUTION INTRAMUSCULAR; INTRAVENOUS at 00:50

## 2021-09-08 RX ADMIN — SODIUM CHLORIDE 4000 MILLILITER(S): 9 INJECTION, SOLUTION INTRAVENOUS at 07:06

## 2021-09-08 RX ADMIN — PANTOPRAZOLE SODIUM 40 MILLIGRAM(S): 20 TABLET, DELAYED RELEASE ORAL at 11:04

## 2021-09-08 RX ADMIN — CHLORHEXIDINE GLUCONATE 1 APPLICATION(S): 213 SOLUTION TOPICAL at 05:05

## 2021-09-08 RX ADMIN — Medication 100 MILLIEQUIVALENT(S): at 11:58

## 2021-09-08 RX ADMIN — SODIUM CHLORIDE 200 MILLILITER(S): 9 INJECTION, SOLUTION INTRAVENOUS at 18:03

## 2021-09-08 RX ADMIN — SODIUM CHLORIDE 4000 MILLILITER(S): 9 INJECTION, SOLUTION INTRAVENOUS at 01:48

## 2021-09-08 RX ADMIN — Medication 100 MILLIEQUIVALENT(S): at 08:05

## 2021-09-08 RX ADMIN — CHLORHEXIDINE GLUCONATE 15 MILLILITER(S): 213 SOLUTION TOPICAL at 17:20

## 2021-09-08 RX ADMIN — PROPOFOL 3.6 MICROGRAM(S)/KG/MIN: 10 INJECTION, EMULSION INTRAVENOUS at 03:47

## 2021-09-08 RX ADMIN — LEVETIRACETAM 400 MILLIGRAM(S): 250 TABLET, FILM COATED ORAL at 03:48

## 2021-09-08 RX ADMIN — PROPOFOL 3.6 MICROGRAM(S)/KG/MIN: 10 INJECTION, EMULSION INTRAVENOUS at 16:20

## 2021-09-08 RX ADMIN — Medication 50 MILLIEQUIVALENT(S): at 05:03

## 2021-09-08 RX ADMIN — CHLORHEXIDINE GLUCONATE 15 MILLILITER(S): 213 SOLUTION TOPICAL at 05:03

## 2021-09-08 RX ADMIN — PIPERACILLIN AND TAZOBACTAM 200 GRAM(S): 4; .5 INJECTION, POWDER, LYOPHILIZED, FOR SOLUTION INTRAVENOUS at 03:48

## 2021-09-08 RX ADMIN — DEXTROSE MONOHYDRATE, SODIUM CHLORIDE, AND POTASSIUM CHLORIDE 150 MILLILITER(S): 50; .745; 4.5 INJECTION, SOLUTION INTRAVENOUS at 23:06

## 2021-09-08 RX ADMIN — HEPARIN SODIUM 5000 UNIT(S): 5000 INJECTION INTRAVENOUS; SUBCUTANEOUS at 21:03

## 2021-09-08 RX ADMIN — SODIUM CHLORIDE 200 MILLILITER(S): 9 INJECTION, SOLUTION INTRAVENOUS at 09:38

## 2021-09-08 RX ADMIN — LEVETIRACETAM 420 MILLIGRAM(S): 250 TABLET, FILM COATED ORAL at 17:20

## 2021-09-08 RX ADMIN — HEPARIN SODIUM 5000 UNIT(S): 5000 INJECTION INTRAVENOUS; SUBCUTANEOUS at 01:48

## 2021-09-08 RX ADMIN — NYSTATIN CREAM 1 APPLICATION(S): 100000 CREAM TOPICAL at 17:20

## 2021-09-08 NOTE — H&P ADULT - HISTORY OF PRESENT ILLNESS
Pt is a 53 y/o female with reportedly no PMHx questionable "DKA attack" in the past per family was misdiagnosed presents to [facility] ED after being found unresponsive in pool of urine @ home yesterday. Pt reported last known normal in yesterday AM. On arrival by EMS, pt unresponsive, agonal, attempted intubation but was unsuccessful. BS in field >400. Brought to ED subsequently intubated. Labs revealing severe DKA w/ pH 6.9, serum co2 2, undetectable BS on f/s, 676 on BMP. Utox neg. CTH/chest unremarkable. ICU consulted.     Seen and examined in ED. Tachycardic, normotensive, saturating 100% on 50% fio2. On 60mcg propofol, 11u insulin.

## 2021-09-08 NOTE — ED ADULT NURSE NOTE - OBJECTIVE STATEMENT
BIBEMS from home s/p syncope. As per patients' son (who was interviewed hours after patient arrival) patient was very lethargic, thirsty and peeing a lot the passed 2 days. Son was asked to go get her orange juice at the store, which was unusual for patient. Patient was sleeping all day 9/6 and son got concerned. Patient was eating and drinking today as usual however was taking multiple naps and still complained of increased urination and lethargy. Son last spoke with mother around 7p and then heard her get up to use the bathroom and heard her fall around 10p when he found her on the floor and called the ambulance. Patient was unresponsive, incontinent of urine. Patient arrived to hospital on BVM and intubated immediately for airway protection and poor saturation. +Kussmals breathing. 20g PIV to L lateral AC on arrival, BL 18g PIV's inserted by RN for better access. Intubated with 7.5 ETT tube, originally 24 @LL but then later pulled back to 21 based on the CXR. Bolus in progress, propofol infusing well for sedation. FS found to be HI, to be started on an insulin drip. Brought to CT for priority scans. Temp sensing montgomery catheter placed nd urine sample sent. Obvious fungal infection to perineum and skin folds however sacral skin in tact. ID/insurance card as well as earrings placed in property bag and taped to bedside. CCM in place. Safety maintained, needs attended, will continue to monitor.

## 2021-09-08 NOTE — H&P ADULT - NSHPPHYSICALEXAM_GEN_ALL_CORE
General: Unresponsive, intubated    HEENT: Pupils equal, reactive to light.  Symmetric. No scleral icterus or injection    PULM: Clear to auscultation bilaterally, no wheezes, rales or rhonchi, no significant sputum production or increased respiratory effort    NECK: Supple, no lymphadenopathy, trachea midline    CVS: sinus tach, no murmurs, +s1/s2    ABD: Soft, nondistended, normoactive bowel sounds    EXT: b/l 2+ LE pitting edema, fungal appearing groin rash b/l    SKIN: Warm and well perfused    NEURO: Unresponsive to all stimuli

## 2021-09-08 NOTE — ED ADULT NURSE REASSESSMENT NOTE - NS ED NURSE REASSESS COMMENT FT1
Increased insulin drip to 15mL/hr as 2a FS still reads HI. Boluses infusing well and propofol sedation remains at 50mcg/kg. VSS, NAD. Safety maintained, needs attended, will continue to monitor.

## 2021-09-08 NOTE — PROVIDER CONTACT NOTE (CRITICAL VALUE NOTIFICATION) - TEST AND RESULT REPORTED:
Lactate 4.3 pH <6.9 glucose >656   Blood gas values
CO2 less than 6
CO2 - 10  CO2 -10  CO2 - 10, Phos 0.9

## 2021-09-08 NOTE — CONSULT NOTE ADULT - SUBJECTIVE AND OBJECTIVE BOX
[facility]                                                                     MD Leda Desai DO                                              Epilepsy Consult #: [phone] ([phone])                                               Office: [address] [place] [address], [facility]                                                 Phone: [phone]; Fax: [phone]    HPI:  Patient is currently intubated and sedated. History obtained from chart. Family number currently unavailable.   55 yo woman with past medical history of diabetes was admitted after being found unresponsive in a pool of urine at home yesterday. She was found to be in severe DKA upon arrival to the hospital. She was unresponsive and intubated and sedated. She had a witnessed generalized tonic clonic seizure overnight and versed was added to propofol at that time. She was loaded on Keppra 1 g IV x 1 dose last night. CT head with no acute findings.    PMHx:    PSHx:  Meds:  Allergies:  FamHx:  SocHx:    Physical Exam                                            [facility]                                                                     MD Leda Desai DO                                              Epilepsy Consult #: [phone] ([phone])                                               Office: [address] [place] [address], [address]                                                 Phone: [phone]; Fax: [phone]    HPI:  Patient is currently intubated and sedated. History obtained from chart. Family number currently unavailable.   55 yo woman with past medical history of diabetes was admitted after being found unresponsive in a pool of urine at home yesterday. She was found to be in severe DKA upon arrival to the hospital. She was unresponsive and intubated and sedated. She had a witnessed generalized tonic clonic seizure overnight and versed was added to propofol at that time. She was loaded on Keppra 1 g IV x 1 dose last night. CT head with no acute findings.    PMHx:  DM, admitted in 8/2020 with DKA    PSHx:  hx of knee surgery    Meds:  MEDICATIONS  (STANDING):  chlorhexidine 0.12% Liquid 15 milliLiter(s) Oral Mucosa every 12 hours  chlorhexidine 2% Cloths 1 Application(s) Topical <User Schedule>  heparin   Injectable 5000 Unit(s) SubCutaneous every 8 hours  insulin regular Infusion 11 Unit(s)/Hr (11 mL/Hr) IV Continuous <Continuous>  lactated ringers 1000 milliLiter(s) (200 mL/Hr) IV Continuous <Continuous>  levETIRAcetam  IVPB 500 milliGRAM(s) IV Intermittent every 12 hours  midazolam Infusion 0.02 mG/kG/Hr (2.27 mL/Hr) IV Continuous <Continuous>  nystatin Powder 1 Application(s) Topical two times a day  pantoprazole  Injectable 40 milliGRAM(s) IV Push daily  propofol Infusion 5 MICROgram(s)/kG/Min (3.6 mL/Hr) IV Continuous <Continuous>  sodium phosphate IVPB 30 milliMole(s) IV Intermittent once    MEDICATIONS  (PRN):    Allergies:  NKA    FamHx:  DM    SocHx:  unknown    ROS: intubated and sedated, unable to obtain    Physical Exam  Vital Signs Last 24 Hrs  T(C): 37.2 (08 Sep 2021 12:04), Max: 37.7 (08 Sep 2021 00:01)  T(F): 99 (08 Sep 2021 12:04), Max: 99.8 (08 Sep 2021 00:01)  HR: 116 (08 Sep 2021 12:00) (103 - 130)  BP: 105/70 (08 Sep 2021 12:00) (85/48 - 137/91)  BP(mean): 80 (08 Sep 2021 12:00) (59 - 103)  RR: 33 (08 Sep 2021 12:00) (8 - 40)  SpO2: 100% (08 Sep 2021 12:00) (94% - 100%)  General: no acute distress  HEENT:NC/AT  Lungs: intubated and sedated  Skin: no rash or ecchymoses    Mental Status: intubated on sedation, unresponsive to verbal or tactile stimuli  CN: pupils sluggishly reactive bilaterally, no gross facial asymmetry  Motor/Sensory:  Does not withdraw to painful stimuli x 4 extremities  Gait: deferred      LABS:  09-08 @ 10:24 Creatine 115 U/L [25 - 170]  09-08 @ 01:39 Creatine 106 U/L [25 - 170]  cret                        14.9   15.52 )-----------( 194      ( 08 Sep 2021 05:50 )             46.4     09-08    137  |  104  |  24.9<H>  ----------------------------<  346<H>  5.0   |  <6.0<LL>  |  1.70<H>    Ca    9.3      08 Sep 2021 10:24  Phos  1.7     09-08  Mg     2.3     09-08    TPro  6.3<L>  /  Alb  3.5  /  TBili  0.3<L>  /  DBili  x   /  AST  22  /  ALT  16  /  AlkPhos  102  09-08    PT/INR - ( 07 Sep 2021 22:45 )   PT: 11.1 sec;   INR: 0.95 ratio         PTT - ( 07 Sep 2021 22:45 )  PTT:29.0 sec    CT head w/o contrast - no acute findings

## 2021-09-08 NOTE — H&P ADULT - ASSESSMENT
Pt is a 55 y/o female with unknown PMHx here with:    Assessment:  1. Acute hypoxic respiratory failure  2. Severe DKA  3. Lactic acidosis  4. Encephalopathy- unclear etiology, in severe DKA  5. Hypovolemia  6. FREDRICK    Plan:  - Admit to MICU  - On VAC 32/400/50/5, wean to 40% fio2, repeat ABG stat. Vent bundle ordered. Actively titrating to maintain Spo2 >90%  - Stable from a cardiac standpoint, mildly tachycardic, suspect hypovolemia related  - Maintain MAP 65-70  - Check formal TTE, consider carotid duplex  - Propofol for sedation, start to wean down, was on 60mcg. Goal RASS 0 to -1.   - CTH unremarkable, TSH w/in normal limits, check ammonia  - Utox neg, BAL <10, tylenol/salicylate normal  - If spikes fever consider LP  - Check CEEG given pt found in pool of urine, could have had seizure that led to incontinence  - Hold off on AEDs for now, neuro consult in AM  - NPO  - Rao for I/Os, trend cr, avoid nephrotoxins, adhere to renal dose adjustments  - Aggressive hydration and lyte repletion, additional 2L LR + 125cc/hr  - Check CK, r/o rhabdo  - insulin gtt per DKA protocol + q1h f/s  - Hep subq for dvt ppx  - Empiric zosyn, f/u cultures, nystatin powder for groin rash. Trend WBC/Temp curve.     Dispo: Full code. Critically ill. Family not at bedside, number was not taken down.     CRITICAL CARE TIME SPENT:  45 mins assessing presenting problems of acute illness that poses high probability of life threatening deterioration or end organ damage/dysfunction.  Medical decision making including initiating plan of care, reviewing data, reviewing radiology, direct patient bedside evaluation and interpretation of vital signs, any necessary ventilator management, discussion with multidisciplinary team, discussing goals of care with patient/family, all non inclusive of procedures

## 2021-09-08 NOTE — H&P ADULT - NSHPLABSRESULTS_GEN_ALL_CORE
Mode: AC/ CMV (Assist Control/ Continuous Mandatory Ventilation)  RR (machine): 32  TV (machine): 400  FiO2: 80  PEEP: 5  ITime: 0.5  MAP: 11  PIP: 20      ICU Vital Signs Last 24 Hrs  T(C): 36.3 (08 Sep 2021 01:07), Max: 37.7 (08 Sep 2021 00:01)  T(F): 97.3 (08 Sep 2021 01:07), Max: 99.8 (08 Sep 2021 00:01)  HR: 122 (08 Sep 2021 01:07) (122 - 130)  BP: 133/61 (08 Sep 2021 01:07) (112/88 - 133/61)  BP(mean): --  ABP: --  ABP(mean): --  RR: 33 (08 Sep 2021 01:) (8 - 33)  SpO2: 100% (08 Sep 2021 01:) (94% - 100%)      ABG - ( 08 Sep 2021 00:21 )  pH, Arterial: <6.942 pH, Blood: x     /  pCO2: <20   /  pO2: 382   / HCO3: 2     / Base Excess: -31.7 /  SaO2: 100.0               I&O's Detail      LABS:                        17.1   14.77 )-----------( 237      ( 07 Sep 2021 22:45 )             56.3         136  |  95<L>  |  21.2<H>  ----------------------------<  676<HH>  5.7<H>   |  <6.0<LL>  |  1.73<H>    Ca    9.4      07 Sep 2021 22:45    TPro  8.1  /  Alb  4.4  /  TBili  0.2<L>  /  DBili  x   /  AST  21  /  ALT  20  /  AlkPhos  137<H>        CARDIAC MARKERS ( 07 Sep 2021 22:45 )  x     / <0.01 ng/mL / 99 U/L / x     / x          CAPILLARY BLOOD GLUCOSE      POCT Blood Glucose.: >530 mg/dL (08 Sep 2021 00:51)    PT/INR - ( 07 Sep 2021 22:45 )   PT: 11.1 sec;   INR: 0.95 ratio         PTT - ( 07 Sep 2021 22:45 )  PTT:29.0 sec  Urinalysis Basic - ( 08 Sep 2021 00:57 )    Color: Yellow / Appearance: Clear / S.020 / pH: x  Gluc: x / Ketone: Large  / Bili: Negative / Urobili: Negative mg/dL   Blood: x / Protein: 100 mg/dL / Nitrite: Negative   Leuk Esterase: Negative / RBC: 3-5 /HPF / WBC Negative   Sq Epi: x / Non Sq Epi: Occasional / Bacteria: x      CULTURES:  Rapid RVP Result: NotDetec ( @ 22:47)    RADIOLOGY: < from: CT Chest No Cont (21 @ 23:43) >      IMPRESSION:  ET tube tip in the right mainstem bronchus; slight retraction is recommended.    < end of copied text >    < from: CT Head No Cont (21 @ 23:36) >      IMPRESSION:  CT head:  No acute intracranial hemorrhage or mass effect.    CT cervical spine:  No CT evidence of cervical spine fracture.    --- End of Report ---    < end of copied text >

## 2021-09-08 NOTE — ED ADULT NURSE NOTE - INTERVENTIONS DEFINITIONS
Stretcher in lowest position, wheels locked, appropriate side rails in place/Provide visual cue, wrist band, yellow gown, etc./Review medications for side effects contributing to fall risk/Provide visual clues: red socks

## 2021-09-08 NOTE — CONSULT NOTE ADULT - ASSESSMENT
53 yo woman with DKA and witnessed generalized tonic clonic seizures last night, unclear if patient has epilepsy history, however, she was admitted in 8/2020 without a documented history of seizures at that time    Seizures in setting of DKA  likely due to DKA, hyperglycemia  Correct underlying metabolic abnormalities  continue cEEG monitoring, first hour of recording without seizure activity or epileptiform abnormalities, official report to follow tomorrow morning.  Decrease Keppra to 500mg bid given renal function    Plan discussed with MICU team    Will continue to follow

## 2021-09-08 NOTE — PROVIDER CONTACT NOTE (EICU) - BACKGROUND
called for Sz activity, ordered verzseed 4 mg, versed drip, keppra 1 G Q 12 H at request of bedside provider.

## 2021-09-08 NOTE — ED ADULT NURSE REASSESSMENT NOTE - NS ED NURSE REASSESS COMMENT FT1
Instructed to increase insulin drip o 20mL/hr as per MICU PA. Report given to Maame for bed 3105, waiting for respiratory therapist for transport upstairs.

## 2021-09-09 LAB
A1C WITH ESTIMATED AVERAGE GLUCOSE RESULT: >16.9 % — HIGH (ref 4–5.6)
A1C WITH ESTIMATED AVERAGE GLUCOSE RESULT: >16.9 % — HIGH (ref 4–5.6)
ANION GAP SERPL CALC-SCNC: 12 MMOL/L — SIGNIFICANT CHANGE UP (ref 5–17)
ANION GAP SERPL CALC-SCNC: 16 MMOL/L — SIGNIFICANT CHANGE UP (ref 5–17)
ANION GAP SERPL CALC-SCNC: 19 MMOL/L — HIGH (ref 5–17)
BUN SERPL-MCNC: 19.8 MG/DL — SIGNIFICANT CHANGE UP (ref 8–20)
BUN SERPL-MCNC: 22.4 MG/DL — HIGH (ref 8–20)
BUN SERPL-MCNC: 23.8 MG/DL — HIGH (ref 8–20)
CALCIUM SERPL-MCNC: 8.7 MG/DL — SIGNIFICANT CHANGE UP (ref 8.6–10.2)
CALCIUM SERPL-MCNC: 8.8 MG/DL — SIGNIFICANT CHANGE UP (ref 8.6–10.2)
CALCIUM SERPL-MCNC: 9.1 MG/DL — SIGNIFICANT CHANGE UP (ref 8.6–10.2)
CHLORIDE SERPL-SCNC: 115 MMOL/L — HIGH (ref 98–107)
CHLORIDE SERPL-SCNC: 116 MMOL/L — HIGH (ref 98–107)
CHLORIDE SERPL-SCNC: 118 MMOL/L — HIGH (ref 98–107)
CO2 SERPL-SCNC: 14 MMOL/L — LOW (ref 22–29)
CO2 SERPL-SCNC: 16 MMOL/L — LOW (ref 22–29)
CO2 SERPL-SCNC: 16 MMOL/L — LOW (ref 22–29)
COVID-19 SPIKE DOMAIN AB INTERP: POSITIVE
COVID-19 SPIKE DOMAIN ANTIBODY RESULT: >250 U/ML — HIGH
CREAT SERPL-MCNC: 1.46 MG/DL — HIGH (ref 0.5–1.3)
CREAT SERPL-MCNC: 1.7 MG/DL — HIGH (ref 0.5–1.3)
CREAT SERPL-MCNC: 1.71 MG/DL — HIGH (ref 0.5–1.3)
ESTIMATED AVERAGE GLUCOSE: >438 MG/DL — HIGH (ref 68–114)
ESTIMATED AVERAGE GLUCOSE: >438 MG/DL — HIGH (ref 68–114)
GAS PNL BLDA: SIGNIFICANT CHANGE UP
GLUCOSE BLDC GLUCOMTR-MCNC: 122 MG/DL — HIGH (ref 70–99)
GLUCOSE BLDC GLUCOMTR-MCNC: 122 MG/DL — HIGH (ref 70–99)
GLUCOSE BLDC GLUCOMTR-MCNC: 126 MG/DL — HIGH (ref 70–99)
GLUCOSE BLDC GLUCOMTR-MCNC: 128 MG/DL — HIGH (ref 70–99)
GLUCOSE BLDC GLUCOMTR-MCNC: 130 MG/DL — HIGH (ref 70–99)
GLUCOSE BLDC GLUCOMTR-MCNC: 133 MG/DL — HIGH (ref 70–99)
GLUCOSE BLDC GLUCOMTR-MCNC: 134 MG/DL — HIGH (ref 70–99)
GLUCOSE BLDC GLUCOMTR-MCNC: 138 MG/DL — HIGH (ref 70–99)
GLUCOSE BLDC GLUCOMTR-MCNC: 139 MG/DL — HIGH (ref 70–99)
GLUCOSE BLDC GLUCOMTR-MCNC: 144 MG/DL — HIGH (ref 70–99)
GLUCOSE BLDC GLUCOMTR-MCNC: 149 MG/DL — HIGH (ref 70–99)
GLUCOSE BLDC GLUCOMTR-MCNC: 155 MG/DL — HIGH (ref 70–99)
GLUCOSE BLDC GLUCOMTR-MCNC: 157 MG/DL — HIGH (ref 70–99)
GLUCOSE BLDC GLUCOMTR-MCNC: 157 MG/DL — HIGH (ref 70–99)
GLUCOSE BLDC GLUCOMTR-MCNC: 162 MG/DL — HIGH (ref 70–99)
GLUCOSE BLDC GLUCOMTR-MCNC: 166 MG/DL — HIGH (ref 70–99)
GLUCOSE BLDC GLUCOMTR-MCNC: 167 MG/DL — HIGH (ref 70–99)
GLUCOSE BLDC GLUCOMTR-MCNC: 186 MG/DL — HIGH (ref 70–99)
GLUCOSE BLDC GLUCOMTR-MCNC: 187 MG/DL — HIGH (ref 70–99)
GLUCOSE BLDC GLUCOMTR-MCNC: 205 MG/DL — HIGH (ref 70–99)
GLUCOSE BLDC GLUCOMTR-MCNC: 230 MG/DL — HIGH (ref 70–99)
GLUCOSE BLDC GLUCOMTR-MCNC: 235 MG/DL — HIGH (ref 70–99)
GLUCOSE BLDC GLUCOMTR-MCNC: >530 MG/DL — CRITICAL HIGH (ref 70–99)
GLUCOSE SERPL-MCNC: 144 MG/DL — HIGH (ref 70–99)
GLUCOSE SERPL-MCNC: 188 MG/DL — HIGH (ref 70–99)
GLUCOSE SERPL-MCNC: 191 MG/DL — HIGH (ref 70–99)
HCT VFR BLD CALC: 34.3 % — LOW (ref 34.5–45)
HCV AB S/CO SERPL IA: 0.09 S/CO — SIGNIFICANT CHANGE UP (ref 0–0.99)
HCV AB SERPL-IMP: SIGNIFICANT CHANGE UP
HGB BLD-MCNC: 11.7 G/DL — SIGNIFICANT CHANGE UP (ref 11.5–15.5)
MAGNESIUM SERPL-MCNC: 1.8 MG/DL — SIGNIFICANT CHANGE UP (ref 1.6–2.6)
MAGNESIUM SERPL-MCNC: 1.9 MG/DL — SIGNIFICANT CHANGE UP (ref 1.6–2.6)
MAGNESIUM SERPL-MCNC: 1.9 MG/DL — SIGNIFICANT CHANGE UP (ref 1.8–2.6)
MCHC RBC-ENTMCNC: 30.5 PG — SIGNIFICANT CHANGE UP (ref 27–34)
MCHC RBC-ENTMCNC: 34.1 GM/DL — SIGNIFICANT CHANGE UP (ref 32–36)
MCV RBC AUTO: 89.3 FL — SIGNIFICANT CHANGE UP (ref 80–100)
PHOSPHATE SERPL-MCNC: 2.3 MG/DL — LOW (ref 2.4–4.7)
PHOSPHATE SERPL-MCNC: 2.5 MG/DL — SIGNIFICANT CHANGE UP (ref 2.4–4.7)
PHOSPHATE SERPL-MCNC: 3.7 MG/DL — SIGNIFICANT CHANGE UP (ref 2.4–4.7)
PLATELET # BLD AUTO: 118 K/UL — LOW (ref 150–400)
POTASSIUM SERPL-MCNC: 3.1 MMOL/L — LOW (ref 3.5–5.3)
POTASSIUM SERPL-MCNC: 3.3 MMOL/L — LOW (ref 3.5–5.3)
POTASSIUM SERPL-MCNC: 3.9 MMOL/L — SIGNIFICANT CHANGE UP (ref 3.5–5.3)
POTASSIUM SERPL-SCNC: 3.1 MMOL/L — LOW (ref 3.5–5.3)
POTASSIUM SERPL-SCNC: 3.3 MMOL/L — LOW (ref 3.5–5.3)
POTASSIUM SERPL-SCNC: 3.9 MMOL/L — SIGNIFICANT CHANGE UP (ref 3.5–5.3)
RBC # BLD: 3.84 M/UL — SIGNIFICANT CHANGE UP (ref 3.8–5.2)
RBC # FLD: 14.4 % — SIGNIFICANT CHANGE UP (ref 10.3–14.5)
SARS-COV-2 IGG+IGM SERPL QL IA: >250 U/ML — HIGH
SARS-COV-2 IGG+IGM SERPL QL IA: POSITIVE
SODIUM SERPL-SCNC: 146 MMOL/L — HIGH (ref 135–145)
SODIUM SERPL-SCNC: 147 MMOL/L — HIGH (ref 135–145)
SODIUM SERPL-SCNC: 148 MMOL/L — HIGH (ref 135–145)
T3 SERPL-MCNC: 44 NG/DL — LOW (ref 80–200)
T4 AB SER-ACNC: 3.1 UG/DL — LOW (ref 4.5–12)
TSH SERPL-MCNC: 1.26 UIU/ML — SIGNIFICANT CHANGE UP (ref 0.27–4.2)
WBC # BLD: 6.45 K/UL — SIGNIFICANT CHANGE UP (ref 3.8–10.5)
WBC # FLD AUTO: 6.45 K/UL — SIGNIFICANT CHANGE UP (ref 3.8–10.5)

## 2021-09-09 PROCEDURE — 99233 SBSQ HOSP IP/OBS HIGH 50: CPT

## 2021-09-09 PROCEDURE — 71045 X-RAY EXAM CHEST 1 VIEW: CPT | Mod: 26,76

## 2021-09-09 PROCEDURE — 95720 EEG PHY/QHP EA INCR W/VEEG: CPT

## 2021-09-09 PROCEDURE — 99232 SBSQ HOSP IP/OBS MODERATE 35: CPT

## 2021-09-09 RX ORDER — DEXTROSE 50 % IN WATER 50 %
25 SYRINGE (ML) INTRAVENOUS ONCE
Refills: 0 | Status: DISCONTINUED | OUTPATIENT
Start: 2021-09-09 | End: 2021-09-27

## 2021-09-09 RX ORDER — DEXTROSE 50 % IN WATER 50 %
15 SYRINGE (ML) INTRAVENOUS ONCE
Refills: 0 | Status: DISCONTINUED | OUTPATIENT
Start: 2021-09-09 | End: 2021-09-27

## 2021-09-09 RX ORDER — POTASSIUM CHLORIDE 20 MEQ
10 PACKET (EA) ORAL
Refills: 0 | Status: COMPLETED | OUTPATIENT
Start: 2021-09-09 | End: 2021-09-09

## 2021-09-09 RX ORDER — POTASSIUM CHLORIDE 20 MEQ
40 PACKET (EA) ORAL EVERY 4 HOURS
Refills: 0 | Status: COMPLETED | OUTPATIENT
Start: 2021-09-09 | End: 2021-09-09

## 2021-09-09 RX ORDER — DEXMEDETOMIDINE HYDROCHLORIDE IN 0.9% SODIUM CHLORIDE 4 UG/ML
0.3 INJECTION INTRAVENOUS
Qty: 200 | Refills: 0 | Status: DISCONTINUED | OUTPATIENT
Start: 2021-09-09 | End: 2021-09-09

## 2021-09-09 RX ORDER — INSULIN GLARGINE 100 [IU]/ML
20 INJECTION, SOLUTION SUBCUTANEOUS
Refills: 0 | Status: DISCONTINUED | OUTPATIENT
Start: 2021-09-09 | End: 2021-09-13

## 2021-09-09 RX ORDER — DEXTROSE 50 % IN WATER 50 %
12.5 SYRINGE (ML) INTRAVENOUS ONCE
Refills: 0 | Status: DISCONTINUED | OUTPATIENT
Start: 2021-09-09 | End: 2021-09-27

## 2021-09-09 RX ORDER — GLUCAGON INJECTION, SOLUTION 0.5 MG/.1ML
1 INJECTION, SOLUTION SUBCUTANEOUS ONCE
Refills: 0 | Status: DISCONTINUED | OUTPATIENT
Start: 2021-09-09 | End: 2021-09-27

## 2021-09-09 RX ORDER — DEXTROSE MONOHYDRATE, SODIUM CHLORIDE, AND POTASSIUM CHLORIDE 50; .745; 4.5 G/1000ML; G/1000ML; G/1000ML
1000 INJECTION, SOLUTION INTRAVENOUS
Refills: 0 | Status: COMPLETED | OUTPATIENT
Start: 2021-09-09 | End: 2021-09-09

## 2021-09-09 RX ORDER — INSULIN LISPRO 100/ML
VIAL (ML) SUBCUTANEOUS EVERY 6 HOURS
Refills: 0 | Status: DISCONTINUED | OUTPATIENT
Start: 2021-09-09 | End: 2021-09-10

## 2021-09-09 RX ORDER — POTASSIUM PHOSPHATE, MONOBASIC POTASSIUM PHOSPHATE, DIBASIC 236; 224 MG/ML; MG/ML
15 INJECTION, SOLUTION INTRAVENOUS ONCE
Refills: 0 | Status: COMPLETED | OUTPATIENT
Start: 2021-09-09 | End: 2021-09-09

## 2021-09-09 RX ADMIN — INSULIN GLARGINE 20 UNIT(S): 100 INJECTION, SOLUTION SUBCUTANEOUS at 20:35

## 2021-09-09 RX ADMIN — NYSTATIN CREAM 1 APPLICATION(S): 100000 CREAM TOPICAL at 17:57

## 2021-09-09 RX ADMIN — DEXMEDETOMIDINE HYDROCHLORIDE IN 0.9% SODIUM CHLORIDE 8.98 MICROGRAM(S)/KG/HR: 4 INJECTION INTRAVENOUS at 08:12

## 2021-09-09 RX ADMIN — NYSTATIN CREAM 1 APPLICATION(S): 100000 CREAM TOPICAL at 05:11

## 2021-09-09 RX ADMIN — DEXMEDETOMIDINE HYDROCHLORIDE IN 0.9% SODIUM CHLORIDE 8.98 MICROGRAM(S)/KG/HR: 4 INJECTION INTRAVENOUS at 05:35

## 2021-09-09 RX ADMIN — Medication 100 MILLIEQUIVALENT(S): at 15:41

## 2021-09-09 RX ADMIN — HEPARIN SODIUM 5000 UNIT(S): 5000 INJECTION INTRAVENOUS; SUBCUTANEOUS at 21:21

## 2021-09-09 RX ADMIN — CHLORHEXIDINE GLUCONATE 15 MILLILITER(S): 213 SOLUTION TOPICAL at 05:10

## 2021-09-09 RX ADMIN — PANTOPRAZOLE SODIUM 40 MILLIGRAM(S): 20 TABLET, DELAYED RELEASE ORAL at 11:20

## 2021-09-09 RX ADMIN — Medication 100 MILLIEQUIVALENT(S): at 08:12

## 2021-09-09 RX ADMIN — DEXTROSE MONOHYDRATE, SODIUM CHLORIDE, AND POTASSIUM CHLORIDE 100 MILLILITER(S): 50; .745; 4.5 INJECTION, SOLUTION INTRAVENOUS at 23:22

## 2021-09-09 RX ADMIN — INSULIN HUMAN 11 UNIT(S)/HR: 100 INJECTION, SOLUTION SUBCUTANEOUS at 05:09

## 2021-09-09 RX ADMIN — HEPARIN SODIUM 5000 UNIT(S): 5000 INJECTION INTRAVENOUS; SUBCUTANEOUS at 14:34

## 2021-09-09 RX ADMIN — Medication 100 MILLIEQUIVALENT(S): at 07:17

## 2021-09-09 RX ADMIN — CHLORHEXIDINE GLUCONATE 1 APPLICATION(S): 213 SOLUTION TOPICAL at 05:11

## 2021-09-09 RX ADMIN — LEVETIRACETAM 420 MILLIGRAM(S): 250 TABLET, FILM COATED ORAL at 05:44

## 2021-09-09 RX ADMIN — DEXTROSE MONOHYDRATE, SODIUM CHLORIDE, AND POTASSIUM CHLORIDE 150 MILLILITER(S): 50; .745; 4.5 INJECTION, SOLUTION INTRAVENOUS at 11:20

## 2021-09-09 RX ADMIN — LEVETIRACETAM 420 MILLIGRAM(S): 250 TABLET, FILM COATED ORAL at 17:57

## 2021-09-09 RX ADMIN — DEXTROSE MONOHYDRATE, SODIUM CHLORIDE, AND POTASSIUM CHLORIDE 150 MILLILITER(S): 50; .745; 4.5 INJECTION, SOLUTION INTRAVENOUS at 05:09

## 2021-09-09 RX ADMIN — Medication 4: at 23:25

## 2021-09-09 RX ADMIN — Medication 100 MILLIEQUIVALENT(S): at 16:43

## 2021-09-09 RX ADMIN — HEPARIN SODIUM 5000 UNIT(S): 5000 INJECTION INTRAVENOUS; SUBCUTANEOUS at 05:10

## 2021-09-09 RX ADMIN — PROPOFOL 3.6 MICROGRAM(S)/KG/MIN: 10 INJECTION, EMULSION INTRAVENOUS at 05:12

## 2021-09-09 RX ADMIN — POTASSIUM PHOSPHATE, MONOBASIC POTASSIUM PHOSPHATE, DIBASIC 62.5 MILLIMOLE(S): 236; 224 INJECTION, SOLUTION INTRAVENOUS at 14:36

## 2021-09-09 RX ADMIN — Medication 100 MILLIEQUIVALENT(S): at 09:21

## 2021-09-09 RX ADMIN — Medication 100 MILLIEQUIVALENT(S): at 14:34

## 2021-09-09 NOTE — EEG REPORT - NS EEG TEXT BOX
[facility]   COMPREHENSIVE EPILEPSY CENTER   REPORT OF LONG-TERM VIDEO EEG     [facility]: [address], [address], [place] [address], Ph#: [phone]  [facility]: [address], [place] [address], Ph#: [phone]  [facility]: [address] [place] [address], Ph#: [phone]    Patient Name: [facility]  Age and : 54y (63)  MRN #: [id number]  Location: [address] [address]  Referring Physician: Loco Resendiz    Start Time/Date: 09:38on 21  End Time/Date: 08:00 on 21  Duration: 22 h 22 min    _____________________________________________________________  STUDY INFORMATION    EEG Recording Technique:  The patient underwent continuous Video-EEG monitoring, using Telemetry System hardware on the XLTek Digital System. EEG and video data were stored on a computer hard drive with important events saved in digital archive files. The material was reviewed by a physician (electroencephalographer / epileptologist) on a daily basis. Kalin and seizure detection algorithms were utilized and reviewed. An EEG Technician attended to the patient, and was available throughout daytime work hours.  The epilepsy center neurologist was available in person or on call 24-hours per day.    EEG Placement and Labeling of Electrodes:  The EEG was performed utilizing 20 channel referential EEG connections (coronal over temporal over parasagittal montage) using all standard 10-20 electrode placements with EKG, with additional electrodes placed in the inferior temporal region using the modified 10-10 montage electrode placements for elective admissions, or if deemed necessary. Recording was at a sampling rate of 256 samples per second per channel. Time synchronized digital video recording was done simultaneously with EEG recording. A low light infrared camera was used for low light recording.     _____________________________________________________________  HISTORY    Patient is a 54y old  Female who presents with a chief complaint of Unresponsive, intubated (09 Sep 2021 04:40)      PERTINENT MEDICATION:  MEDICATIONS  (STANDING):  chlorhexidine 0.12% Liquid 15 milliLiter(s) Oral Mucosa every 12 hours  chlorhexidine 2% Cloths 1 Application(s) Topical <User Schedule>  dexMEDEtomidine Infusion 0.3 MICROgram(s)/kG/Hr (8.98 mL/Hr) IV Continuous <Continuous>  dextrose 5% + lactated ringers with potassium chloride 20 mEq/L 1000 milliLiter(s) (150 mL/Hr) IV Continuous <Continuous>  heparin   Injectable 5000 Unit(s) SubCutaneous every 8 hours  insulin regular Infusion 11 Unit(s)/Hr (11 mL/Hr) IV Continuous <Continuous>  levETIRAcetam  IVPB 500 milliGRAM(s) IV Intermittent every 12 hours  nystatin Powder 1 Application(s) Topical two times a day  pantoprazole  Injectable 40 milliGRAM(s) IV Push daily  potassium chloride   Powder 40 milliEquivalent(s) Oral every 4 hours  potassium chloride  10 mEq/100 mL IVPB 10 milliEquivalent(s) IV Intermittent every 1 hour  propofol Infusion 5 MICROgram(s)/kG/Min (3.6 mL/Hr) IV Continuous <Continuous>    _____________________________________________________________  STUDY INTERPRETATION    Findings: The background was consisted of a relatively low amplitude, continuous arrhythmic mixture of delta, theta, and excessive beta activity. No posterior dominant rhythm was seen.      Background Slowing:  Background predominantly consisted of theta, delta and faster activities.    Focal Slowing:   None were present.    Sleep Background:  Drowsiness and stage II sleep transients were not recorded.      Other Non-Epileptiform Findings:  Diffuse excess beta activity.      Interictal Epileptiform Activity:   None were present.    Events:  Clinical events: None recorded.  Seizures: None recorded.    Activation Procedures:   Hyperventilation was not performed.    Photic stimulation was not performed.     Artifacts:  Intermittent myogenic and movement artifacts were noted.    ECG:  The heart rate on single channel ECG was predominantly between 110-120 BPM.    _____________________________________________________________  EEG SUMMARY/CLASSIFICATION    Abnormal EEG in a sedated patient.  - Diffuse excessive beta activity  - Moderate to severe generalized slowing.    _____________________________________________________________  EEG IMPRESSION/CLINICAL CORRELATE    This EEG is consistent with moderate to severe diffuse cerebral dysfunction.  Diffuse excessive beta activity is likely benzodiazepine side effect.  No epileptiform pattern or seizure seen.  _____________________________________________________________    Leda Mathur DO  Attending Physician, [facility]     [facility]   COMPREHENSIVE EPILEPSY CENTER   REPORT OF LONG-TERM VIDEO EEG     [facility]: [address], [place] [address], Ph#: [phone]  [facility]: [phone], [place] [address], Ph#: [phone]  [facility]: [address] [place] [address], Ph#: [phone]    Patient Name: [facility]  Age and : 54y (63)  MRN #: [id number]  Location: [address] [address]  Referring Physician: Loco Resendiz    Start Time/Date: 09:38on 21  End Time/Date: 08:00 on 21  Duration: 22 h 22 min    _____________________________________________________________  STUDY INFORMATION    EEG Recording Technique:  The patient underwent continuous Video-EEG monitoring, using Telemetry System hardware on the XLTek Digital System. EEG and video data were stored on a computer hard drive with important events saved in digital archive files. The material was reviewed by a physician (electroencephalographer / epileptologist) on a daily basis. Kalin and seizure detection algorithms were utilized and reviewed. An EEG Technician attended to the patient, and was available throughout daytime work hours.  The epilepsy center neurologist was available in person or on call 24-hours per day.    EEG Placement and Labeling of Electrodes:  The EEG was performed utilizing 20 channel referential EEG connections (coronal over temporal over parasagittal montage) using all standard 10-20 electrode placements with EKG, with additional electrodes placed in the inferior temporal region using the modified 10-10 montage electrode placements for elective admissions, or if deemed necessary. Recording was at a sampling rate of 256 samples per second per channel. Time synchronized digital video recording was done simultaneously with EEG recording. A low light infrared camera was used for low light recording.     _____________________________________________________________  HISTORY    Patient is a 54y old  Female who presents with a chief complaint of Unresponsive, intubated (09 Sep 2021 04:40)      PERTINENT MEDICATION:  MEDICATIONS  (STANDING):  chlorhexidine 0.12% Liquid 15 milliLiter(s) Oral Mucosa every 12 hours  chlorhexidine 2% Cloths 1 Application(s) Topical <User Schedule>  dexMEDEtomidine Infusion 0.3 MICROgram(s)/kG/Hr (8.98 mL/Hr) IV Continuous <Continuous>  dextrose 5% + lactated ringers with potassium chloride 20 mEq/L 1000 milliLiter(s) (150 mL/Hr) IV Continuous <Continuous>  heparin   Injectable 5000 Unit(s) SubCutaneous every 8 hours  insulin regular Infusion 11 Unit(s)/Hr (11 mL/Hr) IV Continuous <Continuous>  levETIRAcetam  IVPB 500 milliGRAM(s) IV Intermittent every 12 hours  nystatin Powder 1 Application(s) Topical two times a day  pantoprazole  Injectable 40 milliGRAM(s) IV Push daily  potassium chloride   Powder 40 milliEquivalent(s) Oral every 4 hours  potassium chloride  10 mEq/100 mL IVPB 10 milliEquivalent(s) IV Intermittent every 1 hour  propofol Infusion 5 MICROgram(s)/kG/Min (3.6 mL/Hr) IV Continuous <Continuous>    _____________________________________________________________  STUDY INTERPRETATION    Findings: The background was consisted of a relatively low amplitude, continuous arrhythmic mixture of delta, theta, and excessive beta activity. No posterior dominant rhythm was seen.      Background Slowing:  Background predominantly consisted of theta, delta and faster activities.    Focal Slowing:   None were present.    Sleep Background:  Drowsiness and stage II sleep transients were not recorded.      Other Non-Epileptiform Findings:  Diffuse excess beta activity.      Interictal Epileptiform Activity:   None were present.    Events:  Clinical events: None recorded.  Seizures: None recorded.    Activation Procedures:   Hyperventilation was not performed.    Photic stimulation was not performed.     Artifacts:  Intermittent myogenic and movement artifacts were noted.    ECG:  The heart rate on single channel ECG was predominantly between 110-120 BPM.    _____________________________________________________________  EEG SUMMARY/CLASSIFICATION    Abnormal EEG in a sedated patient.  - Diffuse excessive beta activity.  - Severe generalized slowing.    _____________________________________________________________  EEG IMPRESSION/CLINICAL CORRELATE    This EEG is consistent with severe diffuse cerebral dysfunction.  Diffuse excessive beta activity is likely benzodiazepine side effect.  No epileptiform pattern or seizure seen.  _____________________________________________________________    Leda Mathur DO  Attending Physician, [facility] Epilepsy [place]     [facility]   COMPREHENSIVE EPILEPSY CENTER   REPORT OF LONG-TERM VIDEO EEG     [facility]: [address], [place] [address], Ph#: [phone]  [facility]: [address], [place] [address], Ph#: [phone]  [facility]: [address] [place] [address], Ph#: [phone]    Patient Name: [facility]  Age and : 54y (63)  MRN #: [id number]  Location: [address] [address]  Referring Physician: Loco Resendiz    Start Time/Date: 09:38on 21  End Time/Date: 08:00 on 21  Duration: 22 h 22 min    _____________________________________________________________  STUDY INFORMATION    EEG Recording Technique:  The patient underwent continuous Video-EEG monitoring, using Telemetry System hardware on the XLTek Digital System. EEG and video data were stored on a computer hard drive with important events saved in digital archive files. The material was reviewed by a physician (electroencephalographer / epileptologist) on a daily basis. Kalin and seizure detection algorithms were utilized and reviewed. An EEG Technician attended to the patient, and was available throughout daytime work hours.  The epilepsy center neurologist was available in person or on call 24-hours per day.    EEG Placement and Labeling of Electrodes:  The EEG was performed utilizing 20 channel referential EEG connections (coronal over temporal over parasagittal montage) using all standard 10-20 electrode placements with EKG, with additional electrodes placed in the inferior temporal region using the modified 10-10 montage electrode placements for elective admissions, or if deemed necessary. Recording was at a sampling rate of 256 samples per second per channel. Time synchronized digital video recording was done simultaneously with EEG recording. A low light infrared camera was used for low light recording.     _____________________________________________________________  HISTORY    Patient is a 54y old  Female who presents with a chief complaint of Unresponsive, intubated (09 Sep 2021 04:40)      PERTINENT MEDICATION:  MEDICATIONS  (STANDING):  chlorhexidine 0.12% Liquid 15 milliLiter(s) Oral Mucosa every 12 hours  chlorhexidine 2% Cloths 1 Application(s) Topical <User Schedule>  dexMEDEtomidine Infusion 0.3 MICROgram(s)/kG/Hr (8.98 mL/Hr) IV Continuous <Continuous>  dextrose 5% + lactated ringers with potassium chloride 20 mEq/L 1000 milliLiter(s) (150 mL/Hr) IV Continuous <Continuous>  heparin   Injectable 5000 Unit(s) SubCutaneous every 8 hours  insulin regular Infusion 11 Unit(s)/Hr (11 mL/Hr) IV Continuous <Continuous>  levETIRAcetam  IVPB 500 milliGRAM(s) IV Intermittent every 12 hours  nystatin Powder 1 Application(s) Topical two times a day  pantoprazole  Injectable 40 milliGRAM(s) IV Push daily  potassium chloride   Powder 40 milliEquivalent(s) Oral every 4 hours  potassium chloride  10 mEq/100 mL IVPB 10 milliEquivalent(s) IV Intermittent every 1 hour  propofol Infusion 5 MICROgram(s)/kG/Min (3.6 mL/Hr) IV Continuous <Continuous>    _____________________________________________________________  STUDY INTERPRETATION    Findings: The background was consisted of a relatively low amplitude, continuous arrhythmic mixture of delta, theta, and excessive beta activity. No posterior dominant rhythm was seen.      Background Slowing:  Background predominantly consisted of theta, delta and faster activities.    Focal Slowing:   None were present.    Sleep Background:  Drowsiness and stage II sleep transients were not recorded.      Other Non-Epileptiform Findings:  Diffuse excess beta activity.      Interictal Epileptiform Activity:   None were present.    Events:  Clinical events: None recorded.  Seizures: None recorded.    Activation Procedures:   Hyperventilation was not performed.    Photic stimulation was not performed.     Artifacts:  Intermittent myogenic and movement artifacts were noted.    ECG:  The heart rate on single channel ECG was predominantly between 110-120 BPM.    _____________________________________________________________  EEG SUMMARY/CLASSIFICATION    Abnormal EEG in a sedated patient.  - Diffuse excessive beta activity.  - Severe generalized slowing.    _____________________________________________________________  EEG IMPRESSION/CLINICAL CORRELATE    This EEG is consistent with severe diffuse cerebral dysfunction.  Diffuse excessive beta activity is likely benzodiazepine side effect.  Sinus tachycardia on single lead ECG  No epileptiform pattern or seizure seen.  _____________________________________________________________    Leda Mathur DO  Attending Physician, [facility]ilepsy [place]

## 2021-09-09 NOTE — PROGRESS NOTE ADULT - SUBJECTIVE AND OBJECTIVE BOX
[facility]  MRN-[id number]  Patient is a 54y old  Female who presents with a chief complaint of Unresponsive, intubated (08 Sep 2021 12:13)    HPI:  55 y/o AAF with ? PMHx presenetd with AMS and intubated in ED for acute hypoxic and hypercapnic resp failure with severe metabolic acidosis with DKA which was treated with IVF, Insulin and Na Bicarb. Upon arrival to ICU, had GTC requiring versed pushes and infusion with Prop infusion and Keppra with cEEG while continuing with IVF and insulin and MV.     Hospital course/last 24 hours:   Improving MA  No further GTC on cEEG  Improving BS and renal function   Improving mentation, SAT ok but low vT on SBT this AM      REVIEW OF SYSTEMS:    Could not obtain ROS due to underlying mentation     Physical Exam:  Vital Signs Last 24 Hrs  T(C): 37.7 (09 Sep 2021 03:07), Max: 37.8 (08 Sep 2021 23:16)  T(F): 99.9 (09 Sep 2021 03:07), Max: 100 (08 Sep 2021 23:16)  HR: 88 (09 Sep 2021 04:00) (88 - 122)  BP: 122/76 (09 Sep 2021 04:00) (93/61 - 137/91)  BP(mean): 89 (09 Sep 2021 04:00) (72 - 103)  RR: 32 (09 Sep 2021 04:00) (20 - 40)  SpO2: 100% (09 Sep 2021 04:00) (96% - 100%)    Gen:  arousable   CNS: non focal 	  Neck: no JVD  RES : clear , no wheezing                      CVS: Regular  rhythm. Normal S1/S2  Abd: Soft, non-distended. Bowel sounds present.  Skin: No rash.  Ext:  no edema    ============================I/O===========================   I&O's Detail    07 Sep 2021 07:01  -  08 Sep 2021 07:00  --------------------------------------------------------  IN:    Insulin: 30 mL    IV PiggyBack: 110 mL    Lactated Ringers: 500 mL    Lactated Ringers Bolus: 1000 mL    Midazolam: 10.8 mL    Propofol: 96.9 mL  Total IN: 1747.7 mL    OUT:    Voided (mL): 220 mL  Total OUT: 220 mL    Total NET: 1527.7 mL      08 Sep 2021 07:01  -  09 Sep 2021 04:41  --------------------------------------------------------  IN:    dextrose 5% + lactated ringers: 800 mL    dextrose 5% + lactated ringers w/ Additives: 1100 mL    Insulin: 115 mL    IV PiggyBack: 100 mL    IV PiggyBack: 100 mL    IV PiggyBack: 500 mL    IV PiggyBack: 100 mL    Lactated Ringers: 250 mL    Lactated Ringers Bolus: 1000 mL    Lactated Ringers w/ Additives: 1950 mL    Midazolam: 38.4 mL    Propofol: 254 mL  Total IN: 6307.4 mL    OUT:    Indwelling Catheter - Urethral (mL): 2115 mL  Total OUT: 2115 mL    Total NET: 4192.4 mL        ============================ LABS =========================                        11.7   6.45  )-----------( 118      ( 09 Sep 2021 03:34 )             34.3         144  |  112<H>  |  26.5<H>  ----------------------------<  201<H>  3.4<L>   |  12.0<L>  |  1.75<H>    Ca    8.9      08 Sep 2021 21:11  Phos  1.4       Mg     2.0         TPro  6.3<L>  /  Alb  3.5  /  TBili  0.3<L>  /  DBili  x   /  AST  22  /  ALT  16  /  AlkPhos  102      LIVER FUNCTIONS - ( 08 Sep 2021 05:50 )  Alb: 3.5 g/dL / Pro: 6.3 g/dL / ALK PHOS: 102 U/L / ALT: 16 U/L / AST: 22 U/L / GGT: x           PT/INR - ( 07 Sep 2021 22:45 )   PT: 11.1 sec;   INR: 0.95 ratio         PTT - ( 07 Sep 2021 22:45 )  PTT:29.0 sec  ABG - ( 09 Sep 2021 03:35 )  pH, Arterial: 7.490 pH, Blood: x     /  pCO2: 20    /  pO2: 140   / HCO3: 15    / Base Excess: -8.1  /  SaO2: 100.0             Urinalysis Basic - ( 08 Sep 2021 00:57 )    Color: Yellow / Appearance: Clear / S.020 / pH: x  Gluc: x / Ketone: Large  / Bili: Negative / Urobili: Negative mg/dL   Blood: x / Protein: 100 mg/dL / Nitrite: Negative   Leuk Esterase: Negative / RBC: 3-5 /HPF / WBC Negative   Sq Epi: x / Non Sq Epi: Occasional / Bacteria: x      ======================Micro/Rad/Cardio=================  Culture: Reviewed   CXR: Reviewed  Echo:Reviewed  ======================================================  PAST MEDICAL & SURGICAL HISTORY:  No pertinent past medical history      ====================ASSESSMENT ==============  1: DKA  2: Severe metabolic acidosis   3: Acute metabolic encephalopathy   4: Acute hypoxic and hypercapnic respiratory failure   5: GTC: mostly metabolic origin   6: Obesity  ====================== NEUROLOGY=====================  dexMEDEtomidine Infusion 0.3 MICROgram(s)/kG/Hr (8.98 mL/Hr) IV Continuous <Continuous> for SAT later this AM  levETIRAcetam  IVPB 500 milliGRAM(s) IV Intermittent every 12 hours  propofol Infusion 5 MICROgram(s)/kG/Min (3.6 mL/Hr) IV Continuous <Continuous>, can wean off through this AM  cEEG continued for now     ==================== RESPIRATORY======================  Mechanical Ventilation:  Mode: AC/ CMV (Assist Control/ Continuous Mandatory Ventilation)  RR (machine): 24  TV (machine): 400  FiO2: 40  PEEP: 5  ITime: 0.5  MAP: 11  PIP: 23  SBT later this AM again once better mentation     ====================CARDIOVASCULAR==================  HDS   TTE normal   ===================HEMATOLOGIC/ONC ===================  heparin   Injectable 5000 Unit(s) SubCutaneous every 8 hours    ===================== RENAL =========================  Continue monitoring urine output  Avoid Nephrotoxic agents   Adjust medications for renal fuctiion   Close urinary output monitoring   UA/Renal imaging reviviewed   Replacing electrolytes as needed with Goal K> 4, PO> 3, Mg> 2    ==================== GASTROINTESTINAL===================   mL/Hr) IV Continuous <Continuous>  pantoprazole  Injectable 40 milliGRAM(s) IV Push daily  NPO for now     =======================    ENDOCRINE  =====================  insulin regular Infusion 11 Unit(s)/Hr (11 mL/Hr) IV Continuous <Continuous>  Pending A1C, TSH  dextrose 5% + lactated ringers with potassium chloride 20 mEq/L 1000 milliLiter(s) (150    ========================INFECTIOUS DISEASE================  TYSON  Pending Cx    Patient requires continuous monitoring with bedside rhythm monitoring, pulse oximetry, ventilator  monitoring and intermittent blood gas analysis.    Care plan discussed with ICU care team, family and consultants    Patient remained critical; I have spent 35 minutes providing non-routine care, revaluated multiple times during the day.

## 2021-09-09 NOTE — PROGRESS NOTE ADULT - SUBJECTIVE AND OBJECTIVE BOX
[facility]                                                                     MD Leda Desai DO                                              Epilepsy Consult #: [phone] ([phone])                                               Office: [address] [place] [address], [address]                                                 Phone: [phone]; Fax: [phone]    Interval History:  Patient taken off sedation this morning, now following commands.    HPI 9/8/21:  Patient is currently intubated and sedated. History obtained from chart. Family number currently unavailable.   55 yo woman with past medical history of diabetes was admitted after being found unresponsive in a pool of urine at home yesterday. She was found to be in severe DKA upon arrival to the hospital. She was unresponsive and intubated and sedated. She had a witnessed generalized tonic clonic seizure overnight and versed was added to propofol at that time. She was loaded on Keppra 1 g IV x 1 dose last night. CT head with no acute findings.    Meds:  MEDICATIONS  (STANDING):  chlorhexidine 0.12% Liquid 15 milliLiter(s) Oral Mucosa every 12 hours  chlorhexidine 2% Cloths 1 Application(s) Topical <User Schedule>  dexMEDEtomidine Infusion 0.3 MICROgram(s)/kG/Hr (8.98 mL/Hr) IV Continuous <Continuous>  dextrose 5% + lactated ringers with potassium chloride 20 mEq/L 1000 milliLiter(s) (150 mL/Hr) IV Continuous <Continuous>  heparin   Injectable 5000 Unit(s) SubCutaneous every 8 hours  insulin regular Infusion 11 Unit(s)/Hr (11 mL/Hr) IV Continuous <Continuous>  levETIRAcetam  IVPB 500 milliGRAM(s) IV Intermittent every 12 hours  nystatin Powder 1 Application(s) Topical two times a day  pantoprazole  Injectable 40 milliGRAM(s) IV Push daily  potassium chloride   Powder 40 milliEquivalent(s) Oral every 4 hours  potassium chloride  10 mEq/100 mL IVPB 10 milliEquivalent(s) IV Intermittent every 1 hour  potassium phosphate IVPB 15 milliMole(s) IV Intermittent once  propofol Infusion 5 MICROgram(s)/kG/Min (3.6 mL/Hr) IV Continuous <Continuous>    MEDICATIONS  (PRN):    Physical Exam  Vital Signs Last 24 Hrs  T(C): 37.2 (08 Sep 2021 12:04), Max: 37.7 (08 Sep 2021 00:01)  T(F): 99 (08 Sep 2021 12:04), Max: 99.8 (08 Sep 2021 00:01)  HR: 116 (08 Sep 2021 12:00) (103 - 130)  BP: 105/70 (08 Sep 2021 12:00) (85/48 - 137/91)  BP(mean): 80 (08 Sep 2021 12:00) (59 - 103)  RR: 33 (08 Sep 2021 12:00) (8 - 40)  SpO2: 100% (08 Sep 2021 12:00) (94% - 100%)  General: no acute distress  HEENT:NC/AT  Lungs: intubated and sedated  Skin: no rash or ecchymoses    Mental Status: intubated off sedation, follows simple commands, opens her eyes to command and raises her arms to command  CN: pupils sluggishly reactive bilaterally, no gross facial asymmetry  Motor/Sensory:  Does not withdraw to painful stimuli x 4 extremities  Gait: deferred      LABS:  09-08 @ 10:24 Creatine 115 U/L [25 - 170]  09-08 @ 01:39 Creatine 106 U/L [25 - 170]  cret                        14.9   15.52 )-----------( 194      ( 08 Sep 2021 05:50 )             46.4     09-08    137  |  104  |  24.9<H>  ----------------------------<  346<H>  5.0   |  <6.0<LL>  |  1.70<H>    Ca    9.3      08 Sep 2021 10:24  Phos  1.7     09-08  Mg     2.3     09-08    TPro  6.3<L>  /  Alb  3.5  /  TBili  0.3<L>  /  DBili  x   /  AST  22  /  ALT  16  /  AlkPhos  102  09-08    PT/INR - ( 07 Sep 2021 22:45 )   PT: 11.1 sec;   INR: 0.95 ratio         PTT - ( 07 Sep 2021 22:45 )  PTT:29.0 sec    CT head w/o contrast - no acute findings                                          [facility]                                                                     MD Leda Desai DO                                              Epilepsy Consult #: [phone] ([phone])                                               Office: [address] [place] [address], [address]                                                 Phone: [phone]; Fax: [phone]    Interval History:  Patient taken off sedation this morning, now following commands.    HPI 9/8/21:  Patient is currently intubated and sedated. History obtained from chart. Family number currently unavailable.   55 yo woman with past medical history of diabetes was admitted after being found unresponsive in a pool of urine at home yesterday. She was found to be in severe DKA upon arrival to the hospital. She was unresponsive and intubated and sedated. She had a witnessed generalized tonic clonic seizure overnight and versed was added to propofol at that time. She was loaded on Keppra 1 g IV x 1 dose last night. CT head with no acute findings.    Meds:  MEDICATIONS  (STANDING):  chlorhexidine 0.12% Liquid 15 milliLiter(s) Oral Mucosa every 12 hours  chlorhexidine 2% Cloths 1 Application(s) Topical <User Schedule>  dexMEDEtomidine Infusion 0.3 MICROgram(s)/kG/Hr (8.98 mL/Hr) IV Continuous <Continuous>  dextrose 5% + lactated ringers with potassium chloride 20 mEq/L 1000 milliLiter(s) (150 mL/Hr) IV Continuous <Continuous>  heparin   Injectable 5000 Unit(s) SubCutaneous every 8 hours  insulin regular Infusion 11 Unit(s)/Hr (11 mL/Hr) IV Continuous <Continuous>  levETIRAcetam  IVPB 500 milliGRAM(s) IV Intermittent every 12 hours  nystatin Powder 1 Application(s) Topical two times a day  pantoprazole  Injectable 40 milliGRAM(s) IV Push daily  potassium chloride   Powder 40 milliEquivalent(s) Oral every 4 hours  potassium chloride  10 mEq/100 mL IVPB 10 milliEquivalent(s) IV Intermittent every 1 hour  potassium phosphate IVPB 15 milliMole(s) IV Intermittent once  propofol Infusion 5 MICROgram(s)/kG/Min (3.6 mL/Hr) IV Continuous <Continuous>    MEDICATIONS  (PRN):    Physical Exam  Vital Signs Last 24 Hrs  T(C): 37.2 (08 Sep 2021 12:04), Max: 37.7 (08 Sep 2021 00:01)  T(F): 99 (08 Sep 2021 12:04), Max: 99.8 (08 Sep 2021 00:01)  HR: 116 (08 Sep 2021 12:00) (103 - 130)  BP: 105/70 (08 Sep 2021 12:00) (85/48 - 137/91)  BP(mean): 80 (08 Sep 2021 12:00) (59 - 103)  RR: 33 (08 Sep 2021 12:00) (8 - 40)  SpO2: 100% (08 Sep 2021 12:00) (94% - 100%)  General: no acute distress  HEENT:NC/AT  Lungs: intubated and sedated  Skin: no rash or ecchymoses    Mental Status: intubated off sedation, follows simple commands, opens her eyes to command and raises her arms to command  CN: pupils reactive bilaterally, no gross facial asymmetry  Motor/Sensory:  Moves all extremities at least 2/5 throughout, symmetrically  Gait: deferred      LABS:  09-08 @ 10:24 Creatine 115 U/L [25 - 170]  09-08 @ 01:39 Creatine 106 U/L [25 - 170]  cret                        14.9   15.52 )-----------( 194      ( 08 Sep 2021 05:50 )             46.4     09-08    137  |  104  |  24.9<H>  ----------------------------<  346<H>  5.0   |  <6.0<LL>  |  1.70<H>    Ca    9.3      08 Sep 2021 10:24  Phos  1.7     09-08  Mg     2.3     09-08    TPro  6.3<L>  /  Alb  3.5  /  TBili  0.3<L>  /  DBili  x   /  AST  22  /  ALT  16  /  AlkPhos  102  09-08    PT/INR - ( 07 Sep 2021 22:45 )   PT: 11.1 sec;   INR: 0.95 ratio         PTT - ( 07 Sep 2021 22:45 )  PTT:29.0 sec    CT head w/o contrast - no acute findings    cEEG -   EEG SUMMARY/CLASSIFICATION    Abnormal EEG in a sedated patient.  - Diffuse excessive beta activity.  - Severe generalized slowing.    _____________________________________________________________  EEG IMPRESSION/CLINICAL CORRELATE    This EEG is consistent with severe diffuse cerebral dysfunction.  Diffuse excessive beta activity is likely benzodiazepine side effect.  Sinus tachycardia on single lead ECG  No epileptiform pattern or seizure seen.  _____________________________________________                                          [facility]                                                                     MD Leda Desai DO                                              Epilepsy Consult #: [phone] ([phone])                                               Office: [address] [place] [address], [address]                                                 Phone: [phone]; Fax: [phone]    Interval History:  Patient taken off sedation this morning, now following commands.    HPI 9/8/21:  Patient is currently intubated and sedated. History obtained from chart. Family number currently unavailable.   55 yo woman with past medical history of diabetes was admitted after being found unresponsive in a pool of urine at home yesterday. She was found to be in severe DKA upon arrival to the hospital. She was unresponsive and intubated and sedated. She had a witnessed generalized tonic clonic seizure overnight and versed was added to propofol at that time. She was loaded on Keppra 1 g IV x 1 dose last night. CT head with no acute findings.    Meds:  MEDICATIONS  (STANDING):  chlorhexidine 0.12% Liquid 15 milliLiter(s) Oral Mucosa every 12 hours  chlorhexidine 2% Cloths 1 Application(s) Topical <User Schedule>  dexMEDEtomidine Infusion 0.3 MICROgram(s)/kG/Hr (8.98 mL/Hr) IV Continuous <Continuous>  dextrose 5% + lactated ringers with potassium chloride 20 mEq/L 1000 milliLiter(s) (150 mL/Hr) IV Continuous <Continuous>  heparin   Injectable 5000 Unit(s) SubCutaneous every 8 hours  insulin regular Infusion 11 Unit(s)/Hr (11 mL/Hr) IV Continuous <Continuous>  levETIRAcetam  IVPB 500 milliGRAM(s) IV Intermittent every 12 hours  nystatin Powder 1 Application(s) Topical two times a day  pantoprazole  Injectable 40 milliGRAM(s) IV Push daily  potassium chloride   Powder 40 milliEquivalent(s) Oral every 4 hours  potassium chloride  10 mEq/100 mL IVPB 10 milliEquivalent(s) IV Intermittent every 1 hour  potassium phosphate IVPB 15 milliMole(s) IV Intermittent once  propofol Infusion 5 MICROgram(s)/kG/Min (3.6 mL/Hr) IV Continuous <Continuous>    MEDICATIONS  (PRN):    Physical Exam  Vital Signs Last 24 Hrs  T(C): 37.1 (09 Sep 2021 11:38), Max: 37.8 (08 Sep 2021 23:16)  T(F): 98.8 (09 Sep 2021 11:38), Max: 100 (08 Sep 2021 23:16)  HR: 77 (09 Sep 2021 12:18) (67 - 106)  BP: 104/60 (09 Sep 2021 12:00) (90/60 - 125/83)  BP(mean): 73 (09 Sep 2021 12:00) (69 - 95)  RR: 15 (09 Sep 2021 12:00) (14 - 32)  SpO2: 99% (09 Sep 2021 12:18) (96% - 100%)  General: no acute distress  HEENT:NC/AT  Lungs: intubated and sedated  Skin: no rash or ecchymoses    Mental Status: intubated off sedation, follows simple commands, opens her eyes to command and raises her arms to command  CN: pupils reactive bilaterally, no gross facial asymmetry  Motor/Sensory:  Moves all extremities at least 2/5 throughout, symmetrically  Gait: deferred      LABS:  09-08 @ 10:24 Creatine 115 U/L [25 - 170]  09-08 @ 01:39 Creatine 106 U/L [25 - 170]  cret                        14.9   15.52 )-----------( 194      ( 08 Sep 2021 05:50 )             46.4     09-08    137  |  104  |  24.9<H>  ----------------------------<  346<H>  5.0   |  <6.0<LL>  |  1.70<H>    Ca    9.3      08 Sep 2021 10:24  Phos  1.7     09-08  Mg     2.3     09-08    TPro  6.3<L>  /  Alb  3.5  /  TBili  0.3<L>  /  DBili  x   /  AST  22  /  ALT  16  /  AlkPhos  102  09-08    PT/INR - ( 07 Sep 2021 22:45 )   PT: 11.1 sec;   INR: 0.95 ratio         PTT - ( 07 Sep 2021 22:45 )  PTT:29.0 sec    CT head w/o contrast - no acute findings    cEEG -   EEG SUMMARY/CLASSIFICATION    Abnormal EEG in a sedated patient.  - Diffuse excessive beta activity.  - Severe generalized slowing.    _____________________________________________________________  EEG IMPRESSION/CLINICAL CORRELATE    This EEG is consistent with severe diffuse cerebral dysfunction.  Diffuse excessive beta activity is likely benzodiazepine side effect.  Sinus tachycardia on single lead ECG  No epileptiform pattern or seizure seen.  _____________________________________________

## 2021-09-09 NOTE — AIRWAY REMOVAL NOTE  ADULT & PEDS - RESPIRATORY RHYTHM/PATTERN
Chief Complaint   Patient presents with    post op/ drain removal       HPI   79 y.o. male presents for a post op visit. He has been doing well since his recent surgery. His pain is well controlled. MARU drain output has been 30-40ml in 24 hours.    Review of Systems   Constitutional: Negative for fatigue and unexpected weight change.   HENT: Per HPI.  Eyes: Negative for visual disturbance.   Respiratory: Negative for shortness of breath, hemoptysis   Cardiovascular: Negative for chest pain and palpitations.   Musculoskeletal: Negative for decreased ROM, back pain.   Skin: Negative for rash, sunburn, itching.   Neurological: Negative for dizziness and seizures.   Hematological: Negative for adenopathy. Does not bruise/bleed easily.   Endocrine: Negative for rapid weight loss/weight gain, heat/cold intolerance.     Past Medical History   Patient Active Problem List   Diagnosis    Preop exam for internal medicine    Anxiety state, unspecified    Depressive disorder, not elsewhere classified    Generalized anxiety disorder    Preventative health care    Depression    Lipoma of neck           Past Surgical History   Past Surgical History:   Procedure Laterality Date    APPENDECTOMY      COLONOSCOPY      PROSTATE SURGERY      TESTICLE BIOPSY      TESTICLE SURGERY           Family History   Family History   Problem Relation Age of Onset    Arthritis Mother     Alcohol abuse Father     Cancer Father     Depression Father     Early death Father     Heart disease Father     Hypertension Father     Alcohol abuse Sister     Depression Sister     Dementia Sister     Depression Brother     Alcohol abuse Brother            Social History   .  Social History     Social History    Marital status:      Spouse name: N/A    Number of children: 3    Years of education: N/A     Occupational History    Not on file.     Social History Main Topics    Smoking status: Never Smoker    Smokeless tobacco:  Never Used    Alcohol use 0.6 oz/week     1 Shots of liquor per week      Comment: every day    Drug use: No    Sexual activity: Not on file     Other Topics Concern    Not on file     Social History Narrative    No narrative on file         Allergies   Review of patient's allergies indicates:   Allergen Reactions    No known allergies            Physical Exam     Vitals:    02/02/18 1437   BP: (!) 180/85   Pulse: 81   Temp: 97.9 °F (36.6 °C)         Body mass index is 23.14 kg/m².      General: AOx3, NAD   Respiratory:  Symmetric chest rise, normal effort  Neck: Left neck Incision CDI.  No redness, drainage, or fluid collection noted.  Edges well approximated. MARU drain with small amount of SS fluid.  Face: House Brackmann I bilaterally.     Assessment/Plan          Problem List Items Addressed This Visit        Oncology    Lipoma of neck - Primary        Aleksandra Jacques is healing well. MARU drain output is too high to remove at this visit today. Explained that output should be <30 ml in 24 hours. Questions answered. RTC Monday.           no shortness of breath/rate regular/depth regular/pattern regular/unlabored

## 2021-09-09 NOTE — PROGRESS NOTE ADULT - ASSESSMENT
55 yo woman with DKA and witnessed generalized tonic clonic seizures last night, unclear if patient has epilepsy history, however, she was admitted in 8/2020 without a documented history of seizures at that time    Seizures in setting of DKA  likely due to DKA, hyperglycemia  Correct underlying metabolic abnormalities  continue cEEG monitoring, first hour of recording without seizure activity or epileptiform abnormalities, official report to follow tomorrow morning.  Decrease Keppra to 500mg bid given renal function    Plan discussed with MICU team    Will continue to follow     55 yo woman with DKA and witnessed generalized tonic clonic seizures night of admission, unclear if patient has epilepsy history, however, she was admitted in 8/2020 without a documented history of seizures at that time    Seizures in setting of DKA  likely due to DKA, hyperglycemia  Correct underlying metabolic abnormalities  continue cEEG, patient off sedation since this morning  C/w Keppra 500mg bid  MRI brain epilepsy protocol w/o contrast when stable    Plan discussed with MICU team    Will continue to follow

## 2021-09-10 LAB
ALBUMIN SERPL ELPH-MCNC: 2.8 G/DL — LOW (ref 3.3–5.2)
ALP SERPL-CCNC: 83 U/L — SIGNIFICANT CHANGE UP (ref 40–120)
ALT FLD-CCNC: 14 U/L — SIGNIFICANT CHANGE UP
ANION GAP SERPL CALC-SCNC: 15 MMOL/L — SIGNIFICANT CHANGE UP (ref 5–17)
AST SERPL-CCNC: 21 U/L — SIGNIFICANT CHANGE UP
BILIRUB SERPL-MCNC: 0.6 MG/DL — SIGNIFICANT CHANGE UP (ref 0.4–2)
BUN SERPL-MCNC: 14.1 MG/DL — SIGNIFICANT CHANGE UP (ref 8–20)
BUN SERPL-MCNC: 14.7 MG/DL — SIGNIFICANT CHANGE UP (ref 8–20)
BUN SERPL-MCNC: 17.7 MG/DL — SIGNIFICANT CHANGE UP (ref 8–20)
C PEPTIDE SERPL-MCNC: 0.4 NG/ML — LOW (ref 1.1–4.4)
CALCIUM SERPL-MCNC: 8.6 MG/DL — SIGNIFICANT CHANGE UP (ref 8.6–10.2)
CALCIUM SERPL-MCNC: 8.7 MG/DL — SIGNIFICANT CHANGE UP (ref 8.6–10.2)
CALCIUM SERPL-MCNC: 8.9 MG/DL — SIGNIFICANT CHANGE UP (ref 8.6–10.2)
CHLORIDE SERPL-SCNC: 114 MMOL/L — HIGH (ref 98–107)
CHLORIDE SERPL-SCNC: 116 MMOL/L — HIGH (ref 98–107)
CHLORIDE SERPL-SCNC: 120 MMOL/L — HIGH (ref 98–107)
CO2 SERPL-SCNC: 17 MMOL/L — LOW (ref 22–29)
CO2 SERPL-SCNC: 18 MMOL/L — LOW (ref 22–29)
CO2 SERPL-SCNC: 18 MMOL/L — LOW (ref 22–29)
CREAT SERPL-MCNC: 1.08 MG/DL — SIGNIFICANT CHANGE UP (ref 0.5–1.3)
CREAT SERPL-MCNC: 1.12 MG/DL — SIGNIFICANT CHANGE UP (ref 0.5–1.3)
CREAT SERPL-MCNC: 1.44 MG/DL — HIGH (ref 0.5–1.3)
GLUCOSE BLDC GLUCOMTR-MCNC: 133 MG/DL — HIGH (ref 70–99)
GLUCOSE BLDC GLUCOMTR-MCNC: 151 MG/DL — HIGH (ref 70–99)
GLUCOSE BLDC GLUCOMTR-MCNC: 163 MG/DL — HIGH (ref 70–99)
GLUCOSE BLDC GLUCOMTR-MCNC: 183 MG/DL — HIGH (ref 70–99)
GLUCOSE BLDC GLUCOMTR-MCNC: 197 MG/DL — HIGH (ref 70–99)
GLUCOSE BLDC GLUCOMTR-MCNC: 209 MG/DL — HIGH (ref 70–99)
GLUCOSE SERPL-MCNC: 159 MG/DL — HIGH (ref 70–99)
GLUCOSE SERPL-MCNC: 175 MG/DL — HIGH (ref 70–99)
GLUCOSE SERPL-MCNC: 180 MG/DL — HIGH (ref 70–99)
HCT VFR BLD CALC: 39 % — SIGNIFICANT CHANGE UP (ref 34.5–45)
HGB BLD-MCNC: 12.8 G/DL — SIGNIFICANT CHANGE UP (ref 11.5–15.5)
MAGNESIUM SERPL-MCNC: 1.9 MG/DL — SIGNIFICANT CHANGE UP (ref 1.6–2.6)
MAGNESIUM SERPL-MCNC: 1.9 MG/DL — SIGNIFICANT CHANGE UP (ref 1.6–2.6)
MCHC RBC-ENTMCNC: 30.1 PG — SIGNIFICANT CHANGE UP (ref 27–34)
MCHC RBC-ENTMCNC: 32.8 GM/DL — SIGNIFICANT CHANGE UP (ref 32–36)
MCV RBC AUTO: 91.8 FL — SIGNIFICANT CHANGE UP (ref 80–100)
PHOSPHATE SERPL-MCNC: 2.5 MG/DL — SIGNIFICANT CHANGE UP (ref 2.4–4.7)
PHOSPHATE SERPL-MCNC: 2.8 MG/DL — SIGNIFICANT CHANGE UP (ref 2.4–4.7)
PLATELET # BLD AUTO: 120 K/UL — LOW (ref 150–400)
POTASSIUM SERPL-MCNC: 3.7 MMOL/L — SIGNIFICANT CHANGE UP (ref 3.5–5.3)
POTASSIUM SERPL-SCNC: 3.7 MMOL/L — SIGNIFICANT CHANGE UP (ref 3.5–5.3)
PROT SERPL-MCNC: 5.5 G/DL — LOW (ref 6.6–8.7)
RBC # BLD: 4.25 M/UL — SIGNIFICANT CHANGE UP (ref 3.8–5.2)
RBC # FLD: 15.2 % — HIGH (ref 10.3–14.5)
SODIUM SERPL-SCNC: 147 MMOL/L — HIGH (ref 135–145)
SODIUM SERPL-SCNC: 149 MMOL/L — HIGH (ref 135–145)
SODIUM SERPL-SCNC: 152 MMOL/L — HIGH (ref 135–145)
WBC # BLD: 7.96 K/UL — SIGNIFICANT CHANGE UP (ref 3.8–10.5)
WBC # FLD AUTO: 7.96 K/UL — SIGNIFICANT CHANGE UP (ref 3.8–10.5)

## 2021-09-10 PROCEDURE — 95718 EEG PHYS/QHP 2-12 HR W/VEEG: CPT

## 2021-09-10 PROCEDURE — 99233 SBSQ HOSP IP/OBS HIGH 50: CPT

## 2021-09-10 PROCEDURE — 99223 1ST HOSP IP/OBS HIGH 75: CPT

## 2021-09-10 PROCEDURE — 99232 SBSQ HOSP IP/OBS MODERATE 35: CPT

## 2021-09-10 RX ORDER — SODIUM CHLORIDE 9 MG/ML
2000 INJECTION, SOLUTION INTRAVENOUS ONCE
Refills: 0 | Status: COMPLETED | OUTPATIENT
Start: 2021-09-10 | End: 2021-09-10

## 2021-09-10 RX ORDER — LEVETIRACETAM 250 MG/1
250 TABLET, FILM COATED ORAL
Refills: 0 | Status: COMPLETED | OUTPATIENT
Start: 2021-09-10 | End: 2021-09-12

## 2021-09-10 RX ORDER — INSULIN LISPRO 100/ML
VIAL (ML) SUBCUTANEOUS
Refills: 0 | Status: DISCONTINUED | OUTPATIENT
Start: 2021-09-10 | End: 2021-09-21

## 2021-09-10 RX ORDER — DEXTROSE MONOHYDRATE, SODIUM CHLORIDE, AND POTASSIUM CHLORIDE 50; .745; 4.5 G/1000ML; G/1000ML; G/1000ML
1000 INJECTION, SOLUTION INTRAVENOUS
Refills: 0 | Status: DISCONTINUED | OUTPATIENT
Start: 2021-09-10 | End: 2021-09-12

## 2021-09-10 RX ORDER — DEXTROSE MONOHYDRATE, SODIUM CHLORIDE, AND POTASSIUM CHLORIDE 50; .745; 4.5 G/1000ML; G/1000ML; G/1000ML
1000 INJECTION, SOLUTION INTRAVENOUS
Refills: 0 | Status: DISCONTINUED | OUTPATIENT
Start: 2021-09-10 | End: 2021-09-10

## 2021-09-10 RX ADMIN — SODIUM CHLORIDE 1000 MILLILITER(S): 9 INJECTION, SOLUTION INTRAVENOUS at 10:15

## 2021-09-10 RX ADMIN — Medication 2: at 05:55

## 2021-09-10 RX ADMIN — HEPARIN SODIUM 5000 UNIT(S): 5000 INJECTION INTRAVENOUS; SUBCUTANEOUS at 05:54

## 2021-09-10 RX ADMIN — LEVETIRACETAM 250 MILLIGRAM(S): 250 TABLET, FILM COATED ORAL at 17:00

## 2021-09-10 RX ADMIN — NYSTATIN CREAM 1 APPLICATION(S): 100000 CREAM TOPICAL at 05:54

## 2021-09-10 RX ADMIN — DEXTROSE MONOHYDRATE, SODIUM CHLORIDE, AND POTASSIUM CHLORIDE 150 MILLILITER(S): 50; .745; 4.5 INJECTION, SOLUTION INTRAVENOUS at 11:35

## 2021-09-10 RX ADMIN — Medication 4: at 23:32

## 2021-09-10 RX ADMIN — Medication 2: at 11:09

## 2021-09-10 RX ADMIN — NYSTATIN CREAM 1 APPLICATION(S): 100000 CREAM TOPICAL at 17:00

## 2021-09-10 RX ADMIN — LEVETIRACETAM 250 MILLIGRAM(S): 250 TABLET, FILM COATED ORAL at 11:04

## 2021-09-10 RX ADMIN — INSULIN GLARGINE 20 UNIT(S): 100 INJECTION, SOLUTION SUBCUTANEOUS at 23:31

## 2021-09-10 RX ADMIN — HEPARIN SODIUM 5000 UNIT(S): 5000 INJECTION INTRAVENOUS; SUBCUTANEOUS at 23:35

## 2021-09-10 RX ADMIN — INSULIN GLARGINE 20 UNIT(S): 100 INJECTION, SOLUTION SUBCUTANEOUS at 07:49

## 2021-09-10 RX ADMIN — HEPARIN SODIUM 5000 UNIT(S): 5000 INJECTION INTRAVENOUS; SUBCUTANEOUS at 14:15

## 2021-09-10 RX ADMIN — DEXTROSE MONOHYDRATE, SODIUM CHLORIDE, AND POTASSIUM CHLORIDE 150 MILLILITER(S): 50; .745; 4.5 INJECTION, SOLUTION INTRAVENOUS at 07:50

## 2021-09-10 RX ADMIN — CHLORHEXIDINE GLUCONATE 1 APPLICATION(S): 213 SOLUTION TOPICAL at 05:54

## 2021-09-10 NOTE — DIETITIAN INITIAL EVALUATION ADULT. - PERTINENT LABORATORY DATA
09-10 Na152 mmol/L<H> Glu 175 mg/dL<H> K+ 3.7 mmol/L Cr  1.44 mg/dL<H> BUN 17.7 mg/dL Phos 2.8 mg/dL Alb 2.8 g/dL<L> PAB n/a HgA1c >16.9%

## 2021-09-10 NOTE — DIETITIAN INITIAL EVALUATION ADULT. - OTHER INFO
54 year old female with PMH of prediabetes was BIBA after rell found unresponsive at home. BG >400. Intubated in ED. Initial w/u was consistent w/ DKA. ICU course was complicated by a witnessed generalized tonic clonic seizure. Pt successfully extubated last night. Attempted to interview x 2, pt sleeping soundly. Pt with decreased po intake, consumed 30% of breakfast this morning. Aware HgA1c >16.9%, provided written literature at bedside (meal planning with plate method). RD to follow up with full subjective interview/diet education as feasible.

## 2021-09-10 NOTE — PROGRESS NOTE ADULT - ASSESSMENT
53 yo woman with DKA and witnessed generalized tonic clonic seizures night of admission, no history of prior seizures    Seizures in setting of DKA  likely due to DKA, hyperglycemia  Correct underlying metabolic abnormalities  Discontinue EEG  Taper Keppra as follows as patient does not appear to have epilepsy at this time: Keppra 250mg bid x 2 days(9/10 and 9/11) and then Keppra 250mg in am 9/12/21 and stop (orders placed)  MRI brain epilepsy protocol w/o contrast pending    Plan discussed with MICU team    Epilepsy off service on weekends. General neurology service to cover over weekend.

## 2021-09-10 NOTE — CONSULT NOTE ADULT - ASSESSMENT
1) Hypernatremia  2) Acidosis  3) Hypokalemia  4) DKA    Would start 1/2 NS with KCl 20meq @ 100cc/hr  Trend BMP  SCr improving  Outpatient follow up  Signing off  elda Sibley

## 2021-09-10 NOTE — PROGRESS NOTE ADULT - ASSESSMENT
53 y/o F w/ PMH of prediabetes was BIBA after rell found unresponsive at home.  Per records, pt was reported to be in a pool of urine when she was found.  EMS reported pt had agonal breathing and attempted to intubate her in the field but were unsussessful.  They also reported BG >400.  Pt was still agonal, unresponsive, hypoxic and hypercapnic on arrival to [facility] ED at which time she was intubated by ER.  Initial w/u was consistent w/ DKA. Pt was admitted to MICU.  Labs were significant for pH 6.9, serum HCO3 2, undetectable glucose level on FS and serum BG of 676.  Utox and CTH were negative.   She was started on an insulin gtt, HCO3 gtt and aggressively hydrated w/ IVNS.  ICU course was complicated by a witnessed generalized tonic clonic seizure.  She was given versed and loaded w/ keppra.  Neurology was consulted and placed on cEEG.  Pt was successfully extubated 9/9.  Gap now closed and transitioned off insulin gtt to lantus as of last night.          DKA w/ coma, seizures and severe metabolic acidosis       IVF changed to 1/2 NS with KCL     cEEG report reviewed and is consistent w/ mild to moderate diffuse cerebral dysfunction but no epileptiform pattern or seizures seen.    Correct underlying metabolic abnormalities    C/w Keppra 500mg bid  MRI brain epilepsy protocol w/o contrast when stable        Profound generalized weakness  - Likely due to above   - Will          55 y/o F w/ PMH of prediabetes was BIBA after rell found unresponsive at home.  Per records, pt was reported to be in a pool of urine when she was found.  EMS reported pt had agonal breathing and attempted to intubate her in the field but were unsussessful.  They also reported BG >400.  Pt was still agonal, unresponsive, hypoxic and hypercapnic on arrival to [facility] ED at which time she was intubated by ER.  Initial w/u was consistent w/ DKA. Pt was admitted to MICU.  Labs were significant for pH 6.9, serum HCO3 2, undetectable glucose level on FS and serum BG of 676.  Utox and CTH were negative.   She was started on an insulin gtt, HCO3 gtt and aggressively hydrated w/ IVNS.  ICU course was complicated by a witnessed generalized tonic clonic seizure.  She was given versed and loaded w/ keppra.  Neurology was consulted and placed on cEEG.  Pt was successfully extubated 9/9.  Gap now closed and transitioned off insulin gtt to lantus as of last night.          DKA w/ coma, seizures and severe metabolic acidosis   - Pt now awake and alert.  Extubated last night successfully    - AG closed, metabolic acidosis improving and serum HCO3 trending up  - Transitioned from insulin gtt to lantus 2u BID overnight by MICU team.  Endo consulted for further recs   - cEEG report reviewed and is consistent w/ mild to moderate diffuse cerebral dysfunction but no epileptiform pattern or seizures seen  - Will c/w keppra 500mg bid for now   - MRI brain epilepsy protocol w/o contrast when stable as per neurology recs  - c/w serial BMPs to monitor lytes, renal fxn and bicarb       FREDRICK on ?CKD / Hypernatremia / Hyperchloremia   - Renal function improving since admission  - Base line renal fxn  - Likely 2/2 aggressive hydration w/ IVNS  - Switched to 1/2 NS w/ KCL this morning   - Serial BMPs, monitor lytes and renal fxn closely    - Nephro consulted          Profound generalized weakness  - Likely due to above   - Will consult PT        VTE ppx: lovenox          53 y/o F w/ PMH of prediabetes was BIBA after rell found unresponsive at home.  Per records, pt was reported to be in a pool of urine when she was found.  EMS reported pt had agonal breathing and attempted to intubate her in the field but were unsussessful.  They also reported BG >400.  Pt was still agonal, unresponsive, hypoxic and hypercapnic on arrival to [facility] ED at which time she was intubated by ER.  Initial w/u was consistent w/ DKA. Pt was admitted to MICU.  Labs were significant for pH 6.9, serum HCO3 2, undetectable glucose level on FS and serum BG of 676.  Utox and CTH were negative.   She was started on an insulin gtt, HCO3 gtt and aggressively hydrated w/ IVNS.  ICU course was complicated by a witnessed generalized tonic clonic seizure.  She was given versed and loaded w/ keppra.  Neurology was consulted and placed on cEEG.  Pt was successfully extubated 9/9.  Gap now closed and transitioned off insulin gtt to lantus as of last night.          DKA w/ coma, seizures and severe metabolic acidosis   - Pt now awake and alert.  Extubated last night successfully    - AG closed, metabolic acidosis improving and serum HCO3 trending up  - Transitioned from insulin gtt to lantus 2u BID overnight by MICU team.  Endo consulted for further recs   - cEEG report reviewed and is consistent w/ mild to moderate diffuse cerebral dysfunction but no epileptiform pattern or seizures seen  - Will c/w keppra 500mg bid for now   - MRI brain epilepsy protocol w/o contrast when stable as per neurology recs  - c/w serial BMPs to monitor lytes, renal fxn and bicarb       FREDRICK on ?CKD / Hypernatremia / Hyperchloremia   - Renal function improving since admission   - Base line renal fxn unknown  - Elevated serum Na and Cl likely 2/2 aggressive hydration w/ IVNS  - Switched to 1/2 NS w/ KCL this morning   - Serial BMPs, monitor lytes and renal fxn closely    - Nephro consulted          Profound generalized weakness  - Likely due to above   - Will consult PT        VTE ppx: lovenox

## 2021-09-10 NOTE — DIETITIAN INITIAL EVALUATION ADULT. - PERTINENT MEDS FT
MEDICATIONS  (STANDING):  chlorhexidine 2% Cloths 1 Application(s) Topical <User Schedule>  dextrose 40% Gel 15 Gram(s) Oral once  dextrose 50% Injectable 25 Gram(s) IV Push once  dextrose 50% Injectable 12.5 Gram(s) IV Push once  dextrose 50% Injectable 25 Gram(s) IV Push once  glucagon  Injectable 1 milliGRAM(s) IntraMuscular once  heparin   Injectable 5000 Unit(s) SubCutaneous every 8 hours  insulin glargine Injectable (LANTUS) 20 Unit(s) SubCutaneous two times a day  insulin lispro (ADMELOG) corrective regimen sliding scale   SubCutaneous Before meals and at bedtime  levETIRAcetam 250 milliGRAM(s) Oral two times a day  nystatin Powder 1 Application(s) Topical two times a day  sodium chloride 0.45% with potassium chloride 20 mEq/L 1000 milliLiter(s) (150 mL/Hr) IV Continuous <Continuous>    MEDICATIONS  (PRN):

## 2021-09-10 NOTE — CONSULT NOTE ADULT - SUBJECTIVE AND OBJECTIVE BOX
[facility] DIVISION OF KIDNEY DISEASES AND HYPERTENSION -- INITIAL CONSULT NOTE  --------------------------------------------------------------------------------  HPI:  55 y/o F w/ PMH of prediabetes was BIBA after rell found unresponsive at home.  Per records, pt was reported to be in a pool of urine when she was found.  EMS reported pt had agonal breathing and attempted to intubate her in the field but were unsussessful.  They also reported BG >400.  Pt was still agonal, unresponsive, hypoxic and hypercapnic on arrival to [facility] ED at which time she was intubated by ER.  Initial w/u was consistent w/ DKA. Pt was admitted to MICU.  Labs were significant for pH 6.9, serum HCO3 2, undetectable glucose level on FS and serum BG of 676.  Utox and CTH were negative.   She was started on an insulin gtt, HCO3 gtt and aggressively hydrated w/ IVNS.  ICU course was complicated by a witnessed generalized tonic clonic seizure.  She was given versed and loaded w/ keppra.  Neurology was consulted and placed on cEEG.  Pt was successfully extubated 9/9.  Gap now closed and transitioned off insulin gtt to lantus as of last night.  Pt medically stable for transfer to medical service.   SUBJECTIVE / OVERNIGHT EVENTS:  No acute events reported overnight.  Patient denies chest pain, SOB, abd pain, N/V, fever, chills, dysuria or any other complaints. All remainder ROS negative.       PAST HISTORY  --------------------------------------------------------------------------------  PAST MEDICAL & SURGICAL HISTORY:  No pertinent past medical history      FAMILY HISTORY:    PAST SOCIAL HISTORY:    ALLERGIES & MEDICATIONS  --------------------------------------------------------------------------------  Allergies    No Known Allergies    Intolerances      Standing Inpatient Medications  chlorhexidine 2% Cloths 1 Application(s) Topical <User Schedule>  dextrose 40% Gel 15 Gram(s) Oral once  dextrose 50% Injectable 25 Gram(s) IV Push once  dextrose 50% Injectable 12.5 Gram(s) IV Push once  dextrose 50% Injectable 25 Gram(s) IV Push once  glucagon  Injectable 1 milliGRAM(s) IntraMuscular once  heparin   Injectable 5000 Unit(s) SubCutaneous every 8 hours  insulin glargine Injectable (LANTUS) 20 Unit(s) SubCutaneous two times a day  insulin lispro (ADMELOG) corrective regimen sliding scale   SubCutaneous Before meals and at bedtime  levETIRAcetam 250 milliGRAM(s) Oral two times a day  nystatin Powder 1 Application(s) Topical two times a day  sodium chloride 0.45% with potassium chloride 20 mEq/L 1000 milliLiter(s) IV Continuous <Continuous>    PRN Inpatient Medications      REVIEW OF SYSTEMS  --------------------------------------------------------------------------------  Unable to obtain    VITALS/PHYSICAL EXAM  --------------------------------------------------------------------------------  T(C): 36.5 (09-10-21 @ 12:03), Max: 37.5 (09-09-21 @ 23:12)  HR: 86 (09-10-21 @ 10:00) (71 - 93)  BP: 119/93 (09-10-21 @ 10:00) (96/71 - 144/81)  RR: 23 (09-10-21 @ 10:00) (13 - 23)  SpO2: 98% (09-10-21 @ 10:00) (94% - 100%)  Wt(kg): --        09-09-21 @ 07:01  -  09-10-21 @ 07:00  --------------------------------------------------------  IN: 3439 mL / OUT: 2885 mL / NET: 554 mL    09-10-21 @ 07:01  -  09-10-21 @ 12:16  --------------------------------------------------------  IN: 2750 mL / OUT: 200 mL / NET: 2550 mL      Physical Exam:  GENERAL: pt examined bedside, laying comfortably in bed in NAD  HEENT: NC/AT, moist oral mucosa, clear conjunctiva, sclera nonicteric  RESPIRATORY: Normal respiratory effort; CTA b/l, no wheezing, rhonchi, rales  CARDIOVASCULAR: RRR, normal S1 and S2, no murmur/rub/gallop  ABDOMEN: soft, obese abd, NT/ND, normoactive bowel sounds, no rebound/guarding  EXTREMITIES: No cynaosis, no clubbing, trace lower extremity edema; Peripheral pulses are 2+ bilaterally  PSYCH: slow to respond but answers questions appropriately  Flat affect but cooperative  NEUROLOGY: A+O to person and place, no focal neurologic deficits appreciated   SKIN: No rashes or no palpable lesions      LABS/STUDIES  --------------------------------------------------------------------------------              12.8   7.96  >-----------<  120      [09-10-21 @ 04:35]              39.0     152  |  120  |  17.7  ----------------------------<  175      [09-10-21 @ 04:35]  3.7   |  17.0  |  1.44        Ca     8.9     [09-10-21 @ 04:35]      Mg     1.9     [09-10-21 @ 04:35]      Phos  2.8     [09-10-21 @ 04:35]    TPro  5.5  /  Alb  2.8  /  TBili  0.6  /  DBili  x   /  AST  21  /  ALT  14  /  AlkPhos  83  [09-10-21 @ 04:35]          Creatinine Trend:  SCr 1.44 [09-10 @ 04:35]  SCr 1.46 [09-09 @ 18:13]  SCr 1.70 [09-09 @ 11:27]  SCr 1.71 [09-09 @ 05:23]  SCr 1.75 [09-08 @ 21:11]    Urinalysis - [09-08-21 @ 00:57]      Color Yellow / Appearance Clear / SG 1.020 / pH 5.0      Gluc 1000 / Ketone Large  / Bili Negative / Urobili Negative       Blood Moderate / Protein 100 / Leuk Est Negative / Nitrite Negative      RBC 3-5 / WBC Negative / Hyaline  / Gran  / Sq Epi  / Non Sq Epi Occasional / Bacteria       TSH 1.26      [09-09-21 @ 05:23]    HCV 0.09, Nonreact      [09-09-21 @ 09:40]

## 2021-09-10 NOTE — PROGRESS NOTE ADULT - SUBJECTIVE AND OBJECTIVE BOX
[facility]                                                                     MD Leda Desai,                                               Epilepsy Consult #: [id number] ([phone])                                               Office: [address] [place] [address], [address]                                                 Phone: [phone]; Fax: [phone]    Interval History:  Patient s/p extubation yesterday. Awake, alert and able to provide history. She states prior to admission, she was at home getting ready for work and then blacked out. She states she does not remember after this. She remembers feeling confused prior to blacking out. She denies prior episodes of LOC or a personal or family history of seizures. She states she did not know she had diabetes prior to this admission. She states she was admitted in the past for high blood sugar, but was told she didn't have diabetes. She denies prior head trauma or learning difficulties growing up. She has no further complaints.     HPI 9/8/21:  Patient is currently intubated and sedated. History obtained from chart. Family number currently unavailable.   53 yo woman with past medical history of diabetes was admitted after being found unresponsive in a pool of urine at home yesterday. She was found to be in severe DKA upon arrival to the hospital. She was unresponsive and intubated and sedated. She had a witnessed generalized tonic clonic seizure overnight and versed was added to propofol at that time. She was loaded on Keppra 1 g IV x 1 dose last night. CT head with no acute findings.    Meds:  MEDICATIONS  (STANDING):  chlorhexidine 2% Cloths 1 Application(s) Topical <User Schedule>  dextrose 40% Gel 15 Gram(s) Oral once  dextrose 50% Injectable 25 Gram(s) IV Push once  dextrose 50% Injectable 12.5 Gram(s) IV Push once  dextrose 50% Injectable 25 Gram(s) IV Push once  glucagon  Injectable 1 milliGRAM(s) IntraMuscular once  heparin   Injectable 5000 Unit(s) SubCutaneous every 8 hours  insulin glargine Injectable (LANTUS) 20 Unit(s) SubCutaneous two times a day  insulin lispro (ADMELOG) corrective regimen sliding scale   SubCutaneous Before meals and at bedtime  levETIRAcetam 250 milliGRAM(s) Oral two times a day  nystatin Powder 1 Application(s) Topical two times a day  sodium chloride 0.45% with potassium chloride 20 mEq/L 1000 milliLiter(s) (150 mL/Hr) IV Continuous <Continuous>    MEDICATIONS  (PRN):    Physical Exam  Vital Signs Last 24 Hrs  T(C): 37.1 (09 Sep 2021 11:38), Max: 37.8 (08 Sep 2021 23:16)  T(F): 98.8 (09 Sep 2021 11:38), Max: 100 (08 Sep 2021 23:16)  HR: 77 (09 Sep 2021 12:18) (67 - 106)  BP: 104/60 (09 Sep 2021 12:00) (90/60 - 125/83)  BP(mean): 73 (09 Sep 2021 12:00) (69 - 95)  RR: 15 (09 Sep 2021 12:00) (14 - 32)  SpO2: 99% (09 Sep 2021 12:18) (96% - 100%)  General: no acute distress  HEENT:NC/AT  Lungs: no respiratory distress  Skin: no rash or ecchymoses  Lower extremities: + edema bilaterally    Mental AAO x 3, communicating appropriately  CN: PERRL, EOMI, VFF, V1-V3 sensation intact, no facial asymmetry  Motor:  Moves bilateral upper extremities at least 4/5   Moves bilateral lower extremities at least 3/5  motor evaluation limited due to edema  Sensory: intact to light touch throughout  Gait: deferred      LABS:  cret                        12.8   7.96  )-----------( 120      ( 10 Sep 2021 04:35 )             39.0     09-10    152<H>  |  120<H>  |  17.7  ----------------------------<  175<H>  3.7   |  17.0<L>  |  1.44<H>    Ca    8.9      10 Sep 2021 04:35  Phos  2.8     09-10  Mg     1.9     09-10    TPro  5.5<L>  /  Alb  2.8<L>  /  TBili  0.6  /  DBili  x   /  AST  21  /  ALT  14  /  AlkPhos  83  09-10    CT head w/o contrast - no acute findings    cEEG 9/9/21-9/10/21 -   EEG SUMMARY/CLASSIFICATION    Abnormal EEG  - Mild to moderate generalized slowing.    _____________________________________________________________  EEG IMPRESSION/CLINICAL CORRELATE    This EEG is consistent with mild to moderate diffuse cerebral dysfunction.  No epileptiform pattern or seizure seen.  _____________________________________________________________

## 2021-09-10 NOTE — CONSULT NOTE ADULT - ASSESSMENT
53 y/o F w/ PMH of prediabetes was BIBA after rell found unresponsive at home. She ahd agonal breathing and she was brought to ER where she was intubated and found to have severe DKA, now resolved after standard treatment.  A1c > 16.     DKA w/ comaand severe metabolic acidosis : extubated last night successfully    - AG closed, metabolic acidosis improved  - check Bgs actid and hs   - continue lantus   - use SSI for coverage. ONce she started eating will add meal insulin.   - under investigate for seizure disorder   - Will c/w keppra 500mg bid for now     pseudohyponatremia : secondary to DKA, improved. Cont to monitor CMp     FREDRICK / CKD   - Renal function improving since admission   - continue with 1/2 NS w/ KCL this morning

## 2021-09-10 NOTE — CONSULT NOTE ADULT - SUBJECTIVE AND OBJECTIVE BOX
HPI:  Pt is a 53 y/o female with reportedly no PMHx questionable "DKA attack" in the past per family was misdiagnosed presents to [facility] ED after being found unresponsive in pool of urine @ home yesterday. On arrival by EMS, pt unresponsive, agonal, attempted intubation but was unsuccessful. Brought to ED subsequently intubated. Labs revealing severe DKA w/ pH 6.9, serum co2 2, undetectable BS on f/s, 676 on BMP. Pt now is extubated and interactive., She denies having diabetes.   She claims she lost 20 lbs in the last 2 weeks and had polyuria , polydipsia and blurry vision.     PAST MEDICAL & SURGICAL HISTORY:  No pertinent past medical history    FAMILY HISTORY: sister and mother with DM    SOCIAL HISTORY: lives w son, no etoh, no tobacco, no drugs     REVIEW OF SYSTEMS:  Constitutional: No fever, no chills, lost 20 lbs in the last 3- 4 weeks    Eyes: No eye swelling, HAS  blurry vision, no redness, no loss of vision.  Neck: No neck pain, no change in voice.  Lungs: No shortness of breath, no wheezing, no cough  CV: No chest pain, no palpitations  GI: No nausea, no vomiting, no constipation, no diarrhea, no abdominal pain  : No urinary frequency, no blood in urine  Musculoskeletal: No muscle pain, no joint pain, no swelling.  Skin: No rash  Neurologic: No headaches, no weakness  Endocrine: No heat intolerance, no cold intolerance  Psych: No depression, no anxiety    MEDICATIONS  (STANDING):  chlorhexidine 2% Cloths 1 Application(s) Topical <User Schedule>  dextrose 40% Gel 15 Gram(s) Oral once  dextrose 50% Injectable 25 Gram(s) IV Push once  dextrose 50% Injectable 12.5 Gram(s) IV Push once  dextrose 50% Injectable 25 Gram(s) IV Push once  glucagon  Injectable 1 milliGRAM(s) IntraMuscular once  heparin   Injectable 5000 Unit(s) SubCutaneous every 8 hours  insulin glargine Injectable (LANTUS) 20 Unit(s) SubCutaneous two times a day  insulin lispro (ADMELOG) corrective regimen sliding scale   SubCutaneous Before meals and at bedtime  levETIRAcetam 250 milliGRAM(s) Oral two times a day  nystatin Powder 1 Application(s) Topical two times a day  sodium chloride 0.45% with potassium chloride 20 mEq/L 1000 milliLiter(s) (150 mL/Hr) IV Continuous <Continuous>    Allergies  No Known Allergies    CAPILLARY BLOOD GLUCOSE  200 (08 Sep 2021 19:00)  POCT Blood Glucose.: 197 mg/dL (10 Sep 2021 11:08)      PHYSICAL EXAM:  Vital Signs Last 24 Hrs  T(C): 36.5 (10 Sep 2021 12:03), Max: 37.5 (09 Sep 2021 23:12)  T(F): 97.7 (10 Sep 2021 12:03), Max: 99.5 (09 Sep 2021 23:12)  HR: 82 (10 Sep 2021 14:00) (71 - 93)  BP: 107/87 (10 Sep 2021 14:00) (85/21 - 144/81)  BP(mean): 95 (10 Sep 2021 14:00) (36 - 102)  RR: 13 (10 Sep 2021 14:00) (11 - 23)  SpO2: 99% (10 Sep 2021 14:00) (94% - 100%)    General appearance: Well developed, obese   Eyes: Pupils equal and reactive to light. EOM full. No exophthalmos.  Neck: Trachea midline. No thyroid enlargement.  Lungs: Normal respiratory excursion. Lungs clear.  CV: Regular cardiac rhythm. No murmur. Carotid and pedal pulses intact.  Abdomen: Soft, non tender, no organomegaly or mass.  Musculoskeletal: No cyanosis, clubbing, or edema. No pedal lesions.  Skin: Warm and moist. No rash.   Neuro: Normal motor and sensory function.  Psych: Normal affect, good judgement.    LABS:                        12.8   7.96  )-----------( 120      ( 10 Sep 2021 04:35 )             39.0     09-10    152<H>  |  120<H>  |  17.7  ----------------------------<  175<H>  3.7   |  17.0<L>  |  1.44<H>    Ca    8.9      10 Sep 2021 04:35  Phos  2.8     09-10  Mg     1.9     09-10    TPro  5.5<L>  /  Alb  2.8<L>  /  TBili  0.6  /  DBili  x   /  AST  21  /  ALT  14  /  AlkPhos  83  09-10  LIVER FUNCTIONS - ( 10 Sep 2021 04:35 )  Alb: 2.8 g/dL / Pro: 5.5 g/dL / ALK PHOS: 83 U/L / ALT: 14 U/L / AST: 21 U/L / GGT: x         T4, Serum: 3.1 ug/dL (09-09 @ 03:34)    CAPILLARY BLOOD GLUCOSE  POCT Blood Glucose.: 197 mg/dL (10 Sep 2021 11:08)  POCT Blood Glucose.: 183 mg/dL (10 Sep 2021 05:52)  POCT Blood Glucose.: 151 mg/dL (10 Sep 2021 04:28)  POCT Blood Glucose.: 163 mg/dL (10 Sep 2021 00:39)  POCT Blood Glucose.: 230 mg/dL (09 Sep 2021 23:24)  POCT Blood Glucose.: 122 mg/dL (09 Sep 2021 22:38)  POCT Blood Glucose.: 155 mg/dL (09 Sep 2021 20:18)  POCT Blood Glucose.: 139 mg/dL (09 Sep 2021 18:47)  POCT Blood Glucose.: 144 mg/dL (09 Sep 2021 18:12)  POCT Blood Glucose.: 134 mg/dL (09 Sep 2021 17:16)  POCT Blood Glucose.: 133 mg/dL (09 Sep 2021 16:17)  POCT Blood Glucose.: 126 mg/dL (09 Sep 2021 15:05)

## 2021-09-10 NOTE — PHYSICAL THERAPY INITIAL EVALUATION ADULT - DIAGNOSIS, PT EVAL
Impaired Functional Mobility due to generalized weakness, (+) DKA w/ coma, seizures and severe metabolic acidosis.

## 2021-09-10 NOTE — PROGRESS NOTE ADULT - SUBJECTIVE AND OBJECTIVE BOX
[facility]  MRN-[id number]  Patient is a 54y old  Female who presents with a chief complaint of Unresponsive, intubated (09 Sep 2021 12:04)    HPI:  55 y/o AAF with no PMHx presenetd with AMS and intubated in ED for acute hypoxic and hypercapnic resp failure with severe metabolic acidosis with DKA which was treated with IVF, Insulin and Na Bicarb. Upon arrival to ICU, had GTC requiring versed pushes and infusion with Prop infusion and Keppra with cEEG while continuing with IVF and insulin and MV.     Hospital course/Last 24 hours   Improved mentation after beteer control of acidemia, DKA; now extubated and improved mentation   Last night stopped insulin infusion after lantus   IVF changed to 1/2 NS with KCL         REVIEW OF SYSTEMS:    Gen: No fever  EYES/ENT: No visual changes;  No vertigo or throat pain   NECK: No pain   RES:  No shortness of breath or Cough  CARD: No chest pain   GI: No abdominal pain  : No dysuria  NEURO: No weakness  SKIN: No itching, rashes     Physical Exam:  Vital Signs Last 24 Hrs  T(C): 37.2 (10 Sep 2021 03:08), Max: 37.5 (09 Sep 2021 23:12)  T(F): 99 (10 Sep 2021 03:08), Max: 99.5 (09 Sep 2021 23:12)  HR: 78 (10 Sep 2021 05:00) (67 - 93)  BP: 96/71 (10 Sep 2021 05:00) (90/60 - 144/81)  BP(mean): 81 (10 Sep 2021 05:00) (69 - 100)  RR: 14 (10 Sep 2021 05:00) (14 - 24)  SpO2: 95% (10 Sep 2021 05:00) (94% - 100%)    Gen:  Awake, alert   CNS: non focal 	  Neck: no JVD  RES : clear , no wheezing                      CVS: Regular  rhythm. Normal S1/S2  Abd: Soft, non-distended. Bowel sounds present.  Skin: No rash.  Ext:  no edema    ============================I/O===========================   I&O's Detail    08 Sep 2021 07:01  -  09 Sep 2021 07:00  --------------------------------------------------------  IN:    Dexmedetomidine: 27 mL    dextrose 5% + lactated ringers: 800 mL    dextrose 5% + lactated ringers w/ Additives: 1550 mL    Insulin: 131 mL    IV PiggyBack: 200 mL    IV PiggyBack: 200 mL    IV PiggyBack: 498 mL    IV PiggyBack: 100 mL    IV PiggyBack: 500 mL    Lactated Ringers: 250 mL    Lactated Ringers Bolus: 1000 mL    Lactated Ringers w/ Additives: 1950 mL    Midazolam: 38.4 mL    Propofol: 292.8 mL  Total IN: 7537.2 mL    OUT:    Indwelling Catheter - Urethral (mL): 2355 mL  Total OUT: 2355 mL    Total NET: 5182.2 mL      09 Sep 2021 07:01  -  10 Sep 2021 06:00  --------------------------------------------------------  IN:    dextrose 5% + lactated ringers w/ Additives: 2100 mL    Insulin: 39 mL    IV PiggyBack: 200 mL    sodium chloride 0.45% w/ Additives: 800 mL  Total IN: 3139 mL    OUT:    Dexmedetomidine: 0 mL    Indwelling Catheter - Urethral (mL): 2385 mL    Propofol: 0 mL  Total OUT: 2385 mL    Total NET: 754 mL        ============================ LABS =========================                        12.8   7.96  )-----------( 120      ( 10 Sep 2021 04:35 )             39.0     09-10    152<H>  |  120<H>  |  17.7  ----------------------------<  175<H>  3.7   |  17.0<L>  |  1.44<H>    Ca    8.9      10 Sep 2021 04:35  Phos  2.8     09-10  Mg     1.9     09-10    TPro  5.5<L>  /  Alb  2.8<L>  /  TBili  0.6  /  DBili  x   /  AST  21  /  ALT  14  /  AlkPhos  83  09-10    LIVER FUNCTIONS - ( 10 Sep 2021 04:35 )  Alb: 2.8 g/dL / Pro: 5.5 g/dL / ALK PHOS: 83 U/L / ALT: 14 U/L / AST: 21 U/L / GGT: x             ABG - ( 09 Sep 2021 03:35 )  pH, Arterial: 7.490 pH, Blood: x     /  pCO2: 20    /  pO2: 140   / HCO3: 15    / Base Excess: -8.1  /  SaO2: 100.0               ======================Micro/Rad/Cardio=================  Culture: Reviewed   CXR: Reviewed  Echo:Reviewed  ======================================================  PAST MEDICAL & SURGICAL HISTORY:  No pertinent past medical history      ====================ASSESSMENT ==============  1: DKA with Coma  2: Severe metabolic acidosis   3: Acute metabolic encephalopathy   4: Acute hypoxic and hypercapnic respiratory failure   5: GTC: mostly metabolic origin   6: Obesity  ====================== NEUROLOGY=====================  TYSON  Needs PT, swallow eval   Monitored off of Keppra since last night without any seizure activity; if no concern from neurology, would stop cEEG today   Pending MRI brain   ==================== RESPIRATORY======================    On RA-> oxygenating and ventilating fine for now   ====================CARDIOVASCULAR==================  HDS   ===================HEMATOLOGIC/ONC ===================  heparin   Injectable 5000 Unit(s) SubCutaneous every 8 hours    ===================== RENAL =========================  Continue monitoring urine output  Avoid Nephrotoxic agents   Adjust medications for renal fuctiion   Close urinary output monitoring   UA/Renal imaging reviewed   Replacing electrolytes as needed with Goal K> 4, PO> 3, Mg> 2  Can remove Indwelling urinary cath today once urine output slowed down   1/2 NS with KCL at 150 (overnight at 100 cc/hr) cc/hr with close trending Na and encourage PO hydration as tolerated   sodium chloride 0.45% with potassium chloride 20 mEq/L 1000 milliLiter(s) (150 mL/Hr) IV Continuous <Continuous>    ==================== GASTROINTESTINAL===================  Diet pending speech eval   =======================    ENDOCRINE  =====================  dextrose 40% Gel 15 Gram(s) Oral once  dextrose 50% Injectable 25 Gram(s) IV Push once  dextrose 50% Injectable 12.5 Gram(s) IV Push once  dextrose 50% Injectable 25 Gram(s) IV Push once  glucagon  Injectable 1 milliGRAM(s) IntraMuscular once  insulin glargine Injectable (LANTUS) 20 Unit(s) SubCutaneous two times a day  insulin lispro (ADMELOG) corrective regimen sliding scale   SubCutaneous every 6 hours  Needs Endo consult    ========================INFECTIOUS DISEASE================  TYSON  Nystatin for intertrigo     Patient requires continuous monitoring with bedside rhythm monitoring, pulse oximetry,  monitoring and intermittent blood gas analysis.    Care plan discussed with ICU care team, patient and all questions answered     Patient remained critical; I have spent 35 minutes providing non-routine care, revaluated multiple times during the day.

## 2021-09-10 NOTE — EEG REPORT - NS EEG TEXT BOX
[facility]   COMPREHENSIVE EPILEPSY CENTER   REPORT OF LONG-TERM VIDEO EEG     [facility]: [address], [place] [address], Ph#: [phone]  [facility]: [id number] [address] [place] [address], Ph#: [phone]  [facility]: [address] [place] [address], Ph#: [phone]    Patient Name: [facility]  Age and : 54y (63)  MRN #: [id number]  Location: [address] 01  Referring Physician: Loco Resendiz    Start Time/Date: 8:00 am on 21  End Time/Date: 8:00 am on 9/10/21  Duration: 24 h    _____________________________________________________________  STUDY INFORMATION    EEG Recording Technique:  The patient underwent continuous Video-EEG monitoring, using Telemetry System hardware on the XLTek Digital System. EEG and video data were stored on a computer hard drive with important events saved in digital archive files. The material was reviewed by a physician (electroencephalographer / epileptologist) on a daily basis. Kalin and seizure detection algorithms were utilized and reviewed. An EEG Technician attended to the patient, and was available throughout daytime work hours.  The epilepsy center neurologist was available in person or on call 24-hours per day.    EEG Placement and Labeling of Electrodes:  The EEG was performed utilizing 20 channel referential EEG connections (coronal over temporal over parasagittal montage) using all standard 10-20 electrode placements with EKG, with additional electrodes placed in the inferior temporal region using the modified 10-10 montage electrode placements for elective admissions, or if deemed necessary. Recording was at a sampling rate of 256 samples per second per channel. Time synchronized digital video recording was done simultaneously with EEG recording. A low light infrared camera was used for low light recording.     _____________________________________________________________  HISTORY    Patient is a 54y old  Female who presents with a chief complaint of Unresponsive, intubated (10 Sep 2021 06:00)      PERTINENT MEDICATION:  MEDICATIONS  (STANDING):  chlorhexidine 2% Cloths 1 Application(s) Topical <User Schedule>  dextrose 40% Gel 15 Gram(s) Oral once  dextrose 50% Injectable 25 Gram(s) IV Push once  dextrose 50% Injectable 12.5 Gram(s) IV Push once  dextrose 50% Injectable 25 Gram(s) IV Push once  glucagon  Injectable 1 milliGRAM(s) IntraMuscular once  heparin   Injectable 5000 Unit(s) SubCutaneous every 8 hours  insulin glargine Injectable (LANTUS) 20 Unit(s) SubCutaneous two times a day  insulin lispro (ADMELOG) corrective regimen sliding scale   SubCutaneous Before meals and at bedtime  multiple electrolytes Injection Type 1 Bolus 2000 milliLiter(s) IV Bolus once  nystatin Powder 1 Application(s) Topical two times a day  sodium chloride 0.45% with potassium chloride 20 mEq/L 1000 milliLiter(s) (150 mL/Hr) IV Continuous <Continuous>    _____________________________________________________________  STUDY INTERPRETATION    Findings: The background was continuous, spontaneously variable and reactive. During wakefulness, the posterior dominant rhythm consisted of symmetric, 6-7 Hz activity.    Background Slowing:  Diffuse theta and polymorphic delta slowing.    Focal Slowing:   None were present.    Sleep Background:  Drowsiness was characterized by fragmentation, attenuation, and slowing of the background activity.    Sleep was characterized by the presence of vertex waves, symmetric sleep spindles and K-complexes.    Other Non-Epileptiform Findings:  None were present.    Interictal Epileptiform Activity:   None were present.    Events:  Clinical events: None recorded.  Seizures: None recorded.    Activation Procedures:   Hyperventilation was not performed.    Photic stimulation was not performed.     Artifacts:  Intermittent myogenic and movement artifacts were noted.    ECG:  The heart rate on single channel ECG was predominantly between  BPM.    _____________________________________________________________  EEG SUMMARY/CLASSIFICATION    Abnormal EEG  - Mild to moderate generalized slowing.    _____________________________________________________________  EEG IMPRESSION/CLINICAL CORRELATE    This EEG is consistent with mild to moderate diffuse cerebral dysfunction.  No epileptiform pattern or seizure seen.  _____________________________________________________________    Leda Mathur DO  Attending Physician, [facility]

## 2021-09-10 NOTE — PROGRESS NOTE ADULT - SUBJECTIVE AND OBJECTIVE BOX
MICU Transfer / Hospital Course:   53 y/o F w/ PMH of prediabetes was BIBA after rell found unresponsive at home.  Per records, pt was reported to be in a pool of urine when she was found.  EMS reported pt had agonal breathing and attempted to intubate her in the field but were unsussessful.  They also reported BG >400.  Pt was still agonal, unresponsive, hypoxic and hypercapnic on arrival to [facility] ED at which time she was intubated by ER.  Initial w/u was consistent w/ DKA. Pt was admitted to MICU.  Labs were significant for pH 6.9, serum HCO3 2, undetectable glucose level on FS and serum BG of 676.  Utox and CTH were negative.   She was started on an insulin gtt, HCO3 gtt and aggressively hydrated w/ IVNS.  ICU course was complicated by a witnessed generalized tonic clonic seizure.  She was given versed and loaded w/ keppra.  Neurology was consulted and placed on cEEG.  Pt was successfully extubated 9/9.  Gap now closed and transitioned off insulin gtt to lantus as of last night.  Pt medically stable for transfer to medical service.       SUBJECTIVE / OVERNIGHT EVENTS:  No acute events reported overnight.  Patient denies chest pain, SOB, abd pain, N/V, fever, chills, dysuria or any other complaints. All remainder ROS negative.       I&O's Summary    09 Sep 2021 07:01  -  10 Sep 2021 07:00  --------------------------------------------------------  IN: 3439 mL / OUT: 2885 mL / NET: 554 mL    10 Sep 2021 07:01  -  10 Sep 2021 10:33  --------------------------------------------------------  IN: 2600 mL / OUT: 200 mL / NET: 2400 mL          PHYSICAL EXAM:  Vital Signs Last 24 Hrs  T(C): 37 (10 Sep 2021 07:29), Max: 37.5 (09 Sep 2021 23:12)  T(F): 98.6 (10 Sep 2021 07:29), Max: 99.5 (09 Sep 2021 23:12)  HR: 86 (10 Sep 2021 10:00) (71 - 93)  BP: 119/93 (10 Sep 2021 10:00) (96/71 - 144/81)  BP(mean): 102 (10 Sep 2021 10:00) (73 - 102)  RR: 23 (10 Sep 2021 10:00) (13 - 23)  SpO2: 98% (10 Sep 2021 10:00) (94% - 100%)      GENERAL: pt examined bedside, laying comfortably in bed in NAD  HEENT: NC/AT, moist oral mucosa, clear conjunctiva, sclera nonicteric  RESPIRATORY: Normal respiratory effort; CTA b/l, no wheezing, rhonchi, rales  CARDIOVASCULAR: RRR, normal S1 and S2, no murmur/rub/gallop  ABDOMEN: soft, obese abd, NT/ND, normoactive bowel sounds, no rebound/guarding  EXTREMITIES: No cynaosis, no clubbing, trace lower extremity edema; Peripheral pulses are 2+ bilaterally  PSYCH: slow to respond but answers questions appropriately  Flat affect but cooperative  NEUROLOGY: A+O to person and place, no focal neurologic deficits appreciated   SKIN: No rashes or no palpable lesions        LABS:                        12.8   7.96  )-----------( 120      ( 10 Sep 2021 04:35 )             39.0     09-10    152<H>  |  120<H>  |  17.7  ----------------------------<  175<H>  3.7   |  17.0<L>  |  1.44<H>    Ca    8.9      10 Sep 2021 04:35  Phos  2.8     09-10  Mg     1.9     09-10    TPro  5.5<L>  /  Alb  2.8<L>  /  TBili  0.6  /  DBili  x   /  AST  21  /  ALT  14  /  AlkPhos  83  09-10              Culture - Urine (collected 08 Sep 2021 05:09)  Source: Catheterized Catheterized  Final Report (08 Sep 2021 23:33):    No growth    Culture - Blood (collected 08 Sep 2021 01:02)  Source: .Blood Blood  Preliminary Report (10 Sep 2021 03:01):    No growth at 48 hours    Culture - Blood (collected 08 Sep 2021 01:01)  Source: .Blood Blood  Preliminary Report (10 Sep 2021 03:01):    No growth at 48 hours      CAPILLARY BLOOD GLUCOSE      POCT Blood Glucose.: 183 mg/dL (10 Sep 2021 05:52)  POCT Blood Glucose.: 151 mg/dL (10 Sep 2021 04:28)  POCT Blood Glucose.: 163 mg/dL (10 Sep 2021 00:39)  POCT Blood Glucose.: 230 mg/dL (09 Sep 2021 23:24)  POCT Blood Glucose.: 122 mg/dL (09 Sep 2021 22:38)  POCT Blood Glucose.: 155 mg/dL (09 Sep 2021 20:18)  POCT Blood Glucose.: 139 mg/dL (09 Sep 2021 18:47)  POCT Blood Glucose.: 144 mg/dL (09 Sep 2021 18:12)  POCT Blood Glucose.: 134 mg/dL (09 Sep 2021 17:16)  POCT Blood Glucose.: 133 mg/dL (09 Sep 2021 16:17)  POCT Blood Glucose.: 126 mg/dL (09 Sep 2021 15:05)  POCT Blood Glucose.: 130 mg/dL (09 Sep 2021 14:23)  POCT Blood Glucose.: 157 mg/dL (09 Sep 2021 13:11)  POCT Blood Glucose.: 128 mg/dL (09 Sep 2021 12:17)  POCT Blood Glucose.: 138 mg/dL (09 Sep 2021 11:19)  POCT Blood Glucose.: 122 mg/dL (09 Sep 2021 10:35)        RADIOLOGY & ADDITIONAL TESTS:          MEDICATIONS  (STANDING):  chlorhexidine 2% Cloths 1 Application(s) Topical <User Schedule>  dextrose 40% Gel 15 Gram(s) Oral once  dextrose 50% Injectable 25 Gram(s) IV Push once  dextrose 50% Injectable 12.5 Gram(s) IV Push once  dextrose 50% Injectable 25 Gram(s) IV Push once  glucagon  Injectable 1 milliGRAM(s) IntraMuscular once  heparin   Injectable 5000 Unit(s) SubCutaneous every 8 hours  insulin glargine Injectable (LANTUS) 20 Unit(s) SubCutaneous two times a day  insulin lispro (ADMELOG) corrective regimen sliding scale   SubCutaneous Before meals and at bedtime  levETIRAcetam 250 milliGRAM(s) Oral two times a day  nystatin Powder 1 Application(s) Topical two times a day  sodium chloride 0.45% with potassium chloride 20 mEq/L 1000 milliLiter(s) (150 mL/Hr) IV Continuous <Continuous>    MEDICATIONS  (PRN):

## 2021-09-11 LAB
ALBUMIN SERPL ELPH-MCNC: 2.6 G/DL — LOW (ref 3.3–5.2)
ALP SERPL-CCNC: 70 U/L — SIGNIFICANT CHANGE UP (ref 40–120)
ALT FLD-CCNC: 13 U/L — SIGNIFICANT CHANGE UP
ANION GAP SERPL CALC-SCNC: 14 MMOL/L — SIGNIFICANT CHANGE UP (ref 5–17)
AST SERPL-CCNC: 16 U/L — SIGNIFICANT CHANGE UP
BILIRUB SERPL-MCNC: 0.6 MG/DL — SIGNIFICANT CHANGE UP (ref 0.4–2)
BUN SERPL-MCNC: 12.3 MG/DL — SIGNIFICANT CHANGE UP (ref 8–20)
CALCIUM SERPL-MCNC: 8.6 MG/DL — SIGNIFICANT CHANGE UP (ref 8.6–10.2)
CHLORIDE SERPL-SCNC: 115 MMOL/L — HIGH (ref 98–107)
CO2 SERPL-SCNC: 21 MMOL/L — LOW (ref 22–29)
CREAT SERPL-MCNC: 1.02 MG/DL — SIGNIFICANT CHANGE UP (ref 0.5–1.3)
FOLATE SERPL-MCNC: 8.7 NG/ML — SIGNIFICANT CHANGE UP
GLUCOSE BLDC GLUCOMTR-MCNC: 129 MG/DL — HIGH (ref 70–99)
GLUCOSE BLDC GLUCOMTR-MCNC: 136 MG/DL — HIGH (ref 70–99)
GLUCOSE BLDC GLUCOMTR-MCNC: 166 MG/DL — HIGH (ref 70–99)
GLUCOSE BLDC GLUCOMTR-MCNC: 231 MG/DL — HIGH (ref 70–99)
GLUCOSE SERPL-MCNC: 172 MG/DL — HIGH (ref 70–99)
HCT VFR BLD CALC: 40.1 % — SIGNIFICANT CHANGE UP (ref 34.5–45)
HGB BLD-MCNC: 13.3 G/DL — SIGNIFICANT CHANGE UP (ref 11.5–15.5)
MAGNESIUM SERPL-MCNC: 1.9 MG/DL — SIGNIFICANT CHANGE UP (ref 1.6–2.6)
MCHC RBC-ENTMCNC: 30.5 PG — SIGNIFICANT CHANGE UP (ref 27–34)
MCHC RBC-ENTMCNC: 33.2 GM/DL — SIGNIFICANT CHANGE UP (ref 32–36)
MCV RBC AUTO: 92 FL — SIGNIFICANT CHANGE UP (ref 80–100)
PHOSPHATE SERPL-MCNC: 2.4 MG/DL — SIGNIFICANT CHANGE UP (ref 2.4–4.7)
PLATELET # BLD AUTO: 129 K/UL — LOW (ref 150–400)
POTASSIUM SERPL-MCNC: 3.7 MMOL/L — SIGNIFICANT CHANGE UP (ref 3.5–5.3)
POTASSIUM SERPL-SCNC: 3.7 MMOL/L — SIGNIFICANT CHANGE UP (ref 3.5–5.3)
PROT SERPL-MCNC: 4.9 G/DL — LOW (ref 6.6–8.7)
RBC # BLD: 4.36 M/UL — SIGNIFICANT CHANGE UP (ref 3.8–5.2)
RBC # FLD: 15.4 % — HIGH (ref 10.3–14.5)
SODIUM SERPL-SCNC: 150 MMOL/L — HIGH (ref 135–145)
WBC # BLD: 7.7 K/UL — SIGNIFICANT CHANGE UP (ref 3.8–10.5)
WBC # FLD AUTO: 7.7 K/UL — SIGNIFICANT CHANGE UP (ref 3.8–10.5)

## 2021-09-11 PROCEDURE — 99232 SBSQ HOSP IP/OBS MODERATE 35: CPT

## 2021-09-11 PROCEDURE — 99233 SBSQ HOSP IP/OBS HIGH 50: CPT

## 2021-09-11 RX ORDER — BENZOCAINE AND MENTHOL 5; 1 G/100ML; G/100ML
1 LIQUID ORAL
Refills: 0 | Status: DISCONTINUED | OUTPATIENT
Start: 2021-09-11 | End: 2021-09-22

## 2021-09-11 RX ORDER — ASPIRIN/CALCIUM CARB/MAGNESIUM 324 MG
81 TABLET ORAL DAILY
Refills: 0 | Status: DISCONTINUED | OUTPATIENT
Start: 2021-09-11 | End: 2021-09-20

## 2021-09-11 RX ORDER — PHENOL/SODIUM PHENOLATE
1 AEROSOL, SPRAY (ML) MUCOUS MEMBRANE EVERY 4 HOURS
Refills: 0 | Status: DISCONTINUED | OUTPATIENT
Start: 2021-09-11 | End: 2021-09-27

## 2021-09-11 RX ADMIN — LEVETIRACETAM 250 MILLIGRAM(S): 250 TABLET, FILM COATED ORAL at 17:18

## 2021-09-11 RX ADMIN — DEXTROSE MONOHYDRATE, SODIUM CHLORIDE, AND POTASSIUM CHLORIDE 150 MILLILITER(S): 50; .745; 4.5 INJECTION, SOLUTION INTRAVENOUS at 22:55

## 2021-09-11 RX ADMIN — NYSTATIN CREAM 1 APPLICATION(S): 100000 CREAM TOPICAL at 05:36

## 2021-09-11 RX ADMIN — BENZOCAINE AND MENTHOL 1 LOZENGE: 5; 1 LIQUID ORAL at 11:35

## 2021-09-11 RX ADMIN — Medication 1 SPRAY(S): at 10:31

## 2021-09-11 RX ADMIN — DEXTROSE MONOHYDRATE, SODIUM CHLORIDE, AND POTASSIUM CHLORIDE 150 MILLILITER(S): 50; .745; 4.5 INJECTION, SOLUTION INTRAVENOUS at 08:45

## 2021-09-11 RX ADMIN — DEXTROSE MONOHYDRATE, SODIUM CHLORIDE, AND POTASSIUM CHLORIDE 150 MILLILITER(S): 50; .745; 4.5 INJECTION, SOLUTION INTRAVENOUS at 15:21

## 2021-09-11 RX ADMIN — NYSTATIN CREAM 1 APPLICATION(S): 100000 CREAM TOPICAL at 17:18

## 2021-09-11 RX ADMIN — INSULIN GLARGINE 20 UNIT(S): 100 INJECTION, SOLUTION SUBCUTANEOUS at 08:05

## 2021-09-11 RX ADMIN — Medication 2: at 07:56

## 2021-09-11 RX ADMIN — Medication 81 MILLIGRAM(S): at 11:27

## 2021-09-11 RX ADMIN — Medication 4: at 21:38

## 2021-09-11 RX ADMIN — LEVETIRACETAM 250 MILLIGRAM(S): 250 TABLET, FILM COATED ORAL at 05:36

## 2021-09-11 RX ADMIN — HEPARIN SODIUM 5000 UNIT(S): 5000 INJECTION INTRAVENOUS; SUBCUTANEOUS at 21:39

## 2021-09-11 RX ADMIN — HEPARIN SODIUM 5000 UNIT(S): 5000 INJECTION INTRAVENOUS; SUBCUTANEOUS at 05:36

## 2021-09-11 RX ADMIN — CHLORHEXIDINE GLUCONATE 1 APPLICATION(S): 213 SOLUTION TOPICAL at 05:36

## 2021-09-11 RX ADMIN — HEPARIN SODIUM 5000 UNIT(S): 5000 INJECTION INTRAVENOUS; SUBCUTANEOUS at 13:51

## 2021-09-11 RX ADMIN — INSULIN GLARGINE 20 UNIT(S): 100 INJECTION, SOLUTION SUBCUTANEOUS at 21:38

## 2021-09-11 RX ADMIN — BENZOCAINE AND MENTHOL 1 LOZENGE: 5; 1 LIQUID ORAL at 17:19

## 2021-09-11 NOTE — PROGRESS NOTE ADULT - ASSESSMENT
53 y/o F w/ PMH of prediabetes was BIBA after rell found unresponsive at home.  Per records, pt was reported to be in a pool of urine when she was found.  EMS reported pt had agonal breathing and attempted to intubate her in the field but were unsussessful.  They also reported BG >400.  Pt was still agonal, unresponsive, hypoxic and hypercapnic on arrival to [facility] ED at which time she was intubated by ER.  Initial w/u was consistent w/ DKA. Pt was admitted to MICU.  Labs were significant for pH 6.9, serum HCO3 2, undetectable glucose level on FS and serum BG of 676.  Utox and CTH were negative.   She was started on an insulin gtt, HCO3 gtt and aggressively hydrated w/ IVNS.  ICU course was complicated by a witnessed generalized tonic clonic seizure.  She was given versed and loaded w/ keppra.  Neurology was consulted and placed on cEEG.  Pt was successfully extubated 9/9.  Gap now closed and transitioned off insulin gtt to lantus as of last night.          1-DKA w/ coma and severe metabolic acidosis   - intubated extubated   cont BG monitoring , insulin regimen    metabolic acidosis improving and serum HCO3 trending up  endocrinology on board     2- Seizure , neurology consulted   s/p EEG ;  report reviewed and is consistent w/ mild to moderate diffuse cerebral dysfunction but no epileptiform pattern or seizures seen   c/w keppra 500mg bid for now   - MRI brain epilepsy protocol w/o contrast ordered P      3-FREDRICK on CKD /   cr is improving   on iv fluid   renal consult appreciated     4-Hypernatremia / Hyperchloremia   change iv fluid still na  high   encourage oral fluid intake        5-Profound generalized weakness  r/o stroke   MR ordered P   add aspirin   check lipid profile, TSH , vitamin D   PT mobilize   may need rehab pending progress         VTE ppx: lovenox

## 2021-09-11 NOTE — PROGRESS NOTE ADULT - ASSESSMENT
55 y/o F w/ PMH of prediabetes was BIBA after rell found unresponsive at home. She ahd agonal breathing and she was brought to ER where she was intubated and found to have severe DKA, now resolved after standard treatment.  A1c > 16.     DKA w/ coma and severe metabolic acidosis :   - check Bgs actid and hs   - continue lantus   - use SSI for coverage. ONce she started eating will add meal insulin.   - Will c/w keppra 500mg bid for now     pseudohyponatremia/ hyperNa : continue iV fluids .monitor CMp     FREDRICK / CKD   - Renal function improving since admission   - contineu iVF

## 2021-09-11 NOTE — PROGRESS NOTE ADULT - SUBJECTIVE AND OBJECTIVE BOX
INTERVAL HPI/OVERNIGHT EVENTS:  followup for dm management,     MEDICATIONS  (STANDING):  aspirin enteric coated 81 milliGRAM(s) Oral daily  benzocaine 15 mG/menthol 3.6 mG (Sugar-Free) Lozenge 1 Lozenge Oral four times a day  chlorhexidine 2% Cloths 1 Application(s) Topical <User Schedule>  dextrose 40% Gel 15 Gram(s) Oral once  dextrose 50% Injectable 25 Gram(s) IV Push once  dextrose 50% Injectable 12.5 Gram(s) IV Push once  dextrose 50% Injectable 25 Gram(s) IV Push once  glucagon  Injectable 1 milliGRAM(s) IntraMuscular once  heparin   Injectable 5000 Unit(s) SubCutaneous every 8 hours  insulin glargine Injectable (LANTUS) 20 Unit(s) SubCutaneous two times a day  insulin lispro (ADMELOG) corrective regimen sliding scale   SubCutaneous Before meals and at bedtime  levETIRAcetam 250 milliGRAM(s) Oral two times a day  nystatin Powder 1 Application(s) Topical two times a day  sodium chloride 0.45% with potassium chloride 20 mEq/L 1000 milliLiter(s) (150 mL/Hr) IV Continuous <Continuous>    MEDICATIONS  (PRN):  phenol 1.4% (CHLORASEPTIC) Oral Spray 1 Spray(s) Topical every 4 hours PRN sore throat    Allergies  No Known Allergies    Review of systems: no CP, no SOb. no HA. Feels weakness     Vital Signs Last 24 Hrs  T(C): 36.7 (11 Sep 2021 10:34), Max: 36.8 (10 Sep 2021 15:54)  T(F): 98.1 (11 Sep 2021 10:34), Max: 98.3 (10 Sep 2021 15:54)  HR: 67 (11 Sep 2021 10:34) (67 - 70)  BP: 115/73 (11 Sep 2021 10:34) (108/74 - 115/77)  BP(mean): 85 (10 Sep 2021 16:00) (85 - 85)  RR: 18 (11 Sep 2021 10:34) (11 - 18)  SpO2: 98% (11 Sep 2021 10:34) (96% - 99%)    PHYSICAL EXAM:  General appearance: Well developed, obese   Eyes: Pupils equal and reactive to light.   Neck: Trachea midline.   Lungs: Normal respiratory excursion. Lungs clear.  CV: Regular cardiac rhythm. No murmur. Carotid and pedal pulses intact.  Abdomen: Soft, non tender, no organomegaly or mass.  Skin: Warm and moist. No rash.   Neuro: Normal motor and sensory function.  Psych: Normal affect, good judgement.      LABS:                        13.3   7.70  )-----------( 129      ( 11 Sep 2021 08:18 )             40.1     09-11    150<H>  |  115<H>  |  12.3  ----------------------------<  172<H>  3.7   |  21.0<L>  |  1.02    Ca    8.6      11 Sep 2021 08:17  Phos  2.4     09-11  Mg     1.9     09-11    TPro  4.9<L>  /  Alb  2.6<L>  /  TBili  0.6  /  DBili  x   /  AST  16  /  ALT  13  /  AlkPhos  70  09-11    CAPILLARY BLOOD GLUCOSE  POCT Blood Glucose.: 136 mg/dL (11 Sep 2021 11:26)  POCT Blood Glucose.: 166 mg/dL (11 Sep 2021 07:55)  POCT Blood Glucose.: 209 mg/dL (10 Sep 2021 23:30)  POCT Blood Glucose.: 133 mg/dL (10 Sep 2021 16:33)

## 2021-09-11 NOTE — PROGRESS NOTE ADULT - SUBJECTIVE AND OBJECTIVE BOX
Follow up for unresponsiveness  , DKA , seizure     pt is feeling weak , slow responses , she is awake alert     chart reviewed , labs reviewed      Vital Signs Last 24 Hrs  T(C): 36.8 (11 Sep 2021 05:30), Max: 36.8 (10 Sep 2021 15:54)  T(F): 98.3 (11 Sep 2021 05:30), Max: 98.3 (10 Sep 2021 15:54)  HR: 69 (11 Sep 2021 05:30) (69 - 85)  BP: 115/77 (11 Sep 2021 05:30) (85/21 - 122/88)  BP(mean): 85 (10 Sep 2021 16:00) (36 - 98)  RR: 18 (11 Sep 2021 05:30) (11 - 18)  SpO2: 96% (11 Sep 2021 05:30) (96% - 100%)      GENERAL: pt is laying comfortably in bed in NAD  Neck : supple , no JVD   RESPIRATORY: Normal respiratory effort; CTA b/l, no wheezing, rhonchi, rales  CARDIOVASCULAR: RRR, normal S1 and S2, no murmur/rub/gallop  ABDOMEN: soft, obese abd, NT/ND , normoactive bowel sounds, no rebound/guarding  EXTREMITIES: No cyanosis , no clubbing, trace lower extremity edema; Peripheral pulses are 2+ bilaterally  PSYCH: slow to respond but answers questions appropriately  Flat affect but cooperative  NEUROLOGY: A+O to person and place, no focal neurologic deficits appreciated , generalized weakness  SKIN: No rash, dry normal color                             13.3   7.70  )-----------( 129      ( 11 Sep 2021 08:18 )             40.1   09-11    150<H>  |  115<H>  |  12.3  ----------------------------<  172<H>  3.7   |  21.0<L>  |  1.02    Ca    8.6      11 Sep 2021 08:17  Phos  2.4     09-11  Mg     1.9     09-11    TPro  4.9<L>  /  Alb  2.6<L>  /  TBili  0.6  /  DBili  x   /  AST  16  /  ALT  13  /  AlkPhos  70  09-11        Culture - Urine (collected 08 Sep 2021 05:09)  Source: Catheterized Catheterized  Final Report (08 Sep 2021 23:33):    No growth    Culture - Blood (collected 08 Sep 2021 01:02)  Source: .Blood Blood  Preliminary Report (10 Sep 2021 03:01):    No growth at 48 hours    Culture - Blood (collected 08 Sep 2021 01:01)  Source: .Blood Blood  Preliminary Report (10 Sep 2021 03:01):    No growth at 48 hours      CAPILLARY BLOOD GLUCOSE  CAPILLARY BLOOD GLUCOSE      POCT Blood Glucose.: 166 mg/dL (11 Sep 2021 07:55)  POCT Blood Glucose.: 209 mg/dL (10 Sep 2021 23:30)  POCT Blood Glucose.: 133 mg/dL (10 Sep 2021 16:33)  POCT Blood Glucose.: 197 mg/dL (10 Sep 2021 11:08)      MEDICATIONS  (STANDING):  aspirin enteric coated 81 milliGRAM(s) Oral daily  benzocaine 15 mG/menthol 3.6 mG (Sugar-Free) Lozenge 1 Lozenge Oral four times a day  chlorhexidine 2% Cloths 1 Application(s) Topical <User Schedule>  dextrose 40% Gel 15 Gram(s) Oral once  dextrose 50% Injectable 25 Gram(s) IV Push once  dextrose 50% Injectable 12.5 Gram(s) IV Push once  dextrose 50% Injectable 25 Gram(s) IV Push once  glucagon  Injectable 1 milliGRAM(s) IntraMuscular once  heparin   Injectable 5000 Unit(s) SubCutaneous every 8 hours  insulin glargine Injectable (LANTUS) 20 Unit(s) SubCutaneous two times a day  insulin lispro (ADMELOG) corrective regimen sliding scale   SubCutaneous Before meals and at bedtime  levETIRAcetam 250 milliGRAM(s) Oral two times a day  nystatin Powder 1 Application(s) Topical two times a day  sodium chloride 0.45% with potassium chloride 20 mEq/L 1000 milliLiter(s) (150 mL/Hr) IV Continuous <Continuous>

## 2021-09-12 ENCOUNTER — TRANSCRIPTION ENCOUNTER (OUTPATIENT)
Age: 56
End: 2021-09-12

## 2021-09-12 LAB
24R-OH-CALCIDIOL SERPL-MCNC: 35.8 NG/ML — SIGNIFICANT CHANGE UP (ref 30–80)
ANION GAP SERPL CALC-SCNC: 15 MMOL/L — SIGNIFICANT CHANGE UP (ref 5–17)
BUN SERPL-MCNC: 9.5 MG/DL — SIGNIFICANT CHANGE UP (ref 8–20)
CALCIUM SERPL-MCNC: 9 MG/DL — SIGNIFICANT CHANGE UP (ref 8.6–10.2)
CHLORIDE SERPL-SCNC: 112 MMOL/L — HIGH (ref 98–107)
CHOLEST SERPL-MCNC: 165 MG/DL — SIGNIFICANT CHANGE UP
CO2 SERPL-SCNC: 20 MMOL/L — LOW (ref 22–29)
CREAT SERPL-MCNC: 0.98 MG/DL — SIGNIFICANT CHANGE UP (ref 0.5–1.3)
GLUCOSE BLDC GLUCOMTR-MCNC: 206 MG/DL — HIGH (ref 70–99)
GLUCOSE BLDC GLUCOMTR-MCNC: 210 MG/DL — HIGH (ref 70–99)
GLUCOSE BLDC GLUCOMTR-MCNC: 261 MG/DL — HIGH (ref 70–99)
GLUCOSE BLDC GLUCOMTR-MCNC: 87 MG/DL — SIGNIFICANT CHANGE UP (ref 70–99)
GLUCOSE SERPL-MCNC: 99 MG/DL — SIGNIFICANT CHANGE UP (ref 70–99)
HDLC SERPL-MCNC: 53 MG/DL — SIGNIFICANT CHANGE UP
LIPID PNL WITH DIRECT LDL SERPL: 84 MG/DL — SIGNIFICANT CHANGE UP
NON HDL CHOLESTEROL: 112 MG/DL — SIGNIFICANT CHANGE UP
POTASSIUM SERPL-MCNC: 3.9 MMOL/L — SIGNIFICANT CHANGE UP (ref 3.5–5.3)
POTASSIUM SERPL-SCNC: 3.9 MMOL/L — SIGNIFICANT CHANGE UP (ref 3.5–5.3)
SODIUM SERPL-SCNC: 147 MMOL/L — HIGH (ref 135–145)
TRIGL SERPL-MCNC: 141 MG/DL — SIGNIFICANT CHANGE UP
VIT B12 SERPL-MCNC: 1918 PG/ML — HIGH (ref 232–1245)

## 2021-09-12 PROCEDURE — 99232 SBSQ HOSP IP/OBS MODERATE 35: CPT

## 2021-09-12 RX ADMIN — BENZOCAINE AND MENTHOL 1 LOZENGE: 5; 1 LIQUID ORAL at 17:38

## 2021-09-12 RX ADMIN — INSULIN GLARGINE 20 UNIT(S): 100 INJECTION, SOLUTION SUBCUTANEOUS at 21:43

## 2021-09-12 RX ADMIN — BENZOCAINE AND MENTHOL 1 LOZENGE: 5; 1 LIQUID ORAL at 06:16

## 2021-09-12 RX ADMIN — Medication 1 SPRAY(S): at 18:48

## 2021-09-12 RX ADMIN — HEPARIN SODIUM 5000 UNIT(S): 5000 INJECTION INTRAVENOUS; SUBCUTANEOUS at 14:30

## 2021-09-12 RX ADMIN — Medication 4: at 21:43

## 2021-09-12 RX ADMIN — LEVETIRACETAM 250 MILLIGRAM(S): 250 TABLET, FILM COATED ORAL at 06:15

## 2021-09-12 RX ADMIN — INSULIN GLARGINE 20 UNIT(S): 100 INJECTION, SOLUTION SUBCUTANEOUS at 07:51

## 2021-09-12 RX ADMIN — Medication 6: at 11:31

## 2021-09-12 RX ADMIN — HEPARIN SODIUM 5000 UNIT(S): 5000 INJECTION INTRAVENOUS; SUBCUTANEOUS at 21:40

## 2021-09-12 RX ADMIN — Medication 4: at 16:38

## 2021-09-12 RX ADMIN — Medication 81 MILLIGRAM(S): at 11:31

## 2021-09-12 RX ADMIN — NYSTATIN CREAM 1 APPLICATION(S): 100000 CREAM TOPICAL at 17:37

## 2021-09-12 RX ADMIN — BENZOCAINE AND MENTHOL 1 LOZENGE: 5; 1 LIQUID ORAL at 11:32

## 2021-09-12 RX ADMIN — HEPARIN SODIUM 5000 UNIT(S): 5000 INJECTION INTRAVENOUS; SUBCUTANEOUS at 06:15

## 2021-09-12 RX ADMIN — NYSTATIN CREAM 1 APPLICATION(S): 100000 CREAM TOPICAL at 06:15

## 2021-09-12 RX ADMIN — CHLORHEXIDINE GLUCONATE 1 APPLICATION(S): 213 SOLUTION TOPICAL at 06:16

## 2021-09-12 NOTE — DISCHARGE NOTE PROVIDER - NSDCMRMEDTOKEN_GEN_ALL_CORE_FT
Admelog SoloStar 100 units/mL injectable solution: 5 unit(s) injectable 3 times a day (before meals)   alcohol swabs : Apply topically to affected area 4 times a day   glucometer (per patient&#x27;s insurance): Test blood sugars four times a day. Dispense #1 glucometer.  Insulin Pen Needles, 4mm: 1 application subcutaneously 4 times a day. ** Use with insulin pen **   Lantus Solostar Pen 100 units/mL subcutaneous solution: 25 unit(s) subcutaneous once a day (in the morning)   test strips (per patient&#x27;s insurance): 1 application subcutaneously 4 times a day. ** Compatible with patient&#x27;s glucometer **   Admelog SoloStar 100 units/mL injectable solution: 5 unit(s) injectable 3 times a day (before meals)   alcohol swabs : Apply topically to affected area 4 times a day   DME: Rolling Walker:   glucometer (per patient&#x27;s insurance): Test blood sugars four times a day. Dispense #1 glucometer.  Insulin Pen Needles, 4mm: 1 application subcutaneously 4 times a day. ** Use with insulin pen **   Lantus Solostar Pen 100 units/mL subcutaneous solution: 25 unit(s) subcutaneous once a day (in the morning)   test strips (per patient&#x27;s insurance): 1 application subcutaneously 4 times a day. ** Compatible with patient&#x27;s glucometer **   Admelog SoloStar 100 units/mL injectable solution: 4 unit(s) injectable 3 times a day (before meals)    1.take with meals, add carbohydrates to meals  2. hold if sugars are less than 80 or if not eating     Lantus Solostar Pen 100 units/mL subcutaneous solution: 16 unit(s) subcutaneous once a day (in the morning)   levETIRAcetam 500 mg oral tablet: 1 tab(s) orally 2 times a day   Oxaydo 5 mg oral tablet: 1 tab(s) orally every 6 hours, As Needed -Moderate Pain (4 - 6) - for moderate pain MDD:4 tabs  rolling walker: 1 application by injection into the ganglia once a day (at bedtime)

## 2021-09-12 NOTE — DISCHARGE NOTE PROVIDER - PROVIDER TOKENS
PROVIDER:[TOKEN:[[id number]]],FREE:[LAST:[primary care Doctor],PHONE:[(   )    -],FAX:[(   )    -]] PROVIDER:[TOKEN:[[id number]]],FREE:[LAST:[primary care Doctor],PHONE:[(   )    -],FAX:[(   )    -]],PROVIDER:[TOKEN:[[id number]],FOLLOWUP:[2 weeks]] PROVIDER:[TOKEN:[[id number]]],PROVIDER:[TOKEN:[[id number]],FOLLOWUP:[2 weeks]],FREE:[LAST:[primary care Doctor],PHONE:[(   )    -],FAX:[(   )    -]],PROVIDER:[TOKEN:[[id number]]]

## 2021-09-12 NOTE — DISCHARGE NOTE PROVIDER - CARE PROVIDERS DIRECT ADDRESSES
,[email or web address],DirectAddress_Unknown ,[email or web address],[email or web address],[email or web address] ,[email or web address],DirectAddress_Unknown,DirectAddress_Unknown

## 2021-09-12 NOTE — DISCHARGE NOTE PROVIDER - NSDCCPCAREPLAN_GEN_ALL_CORE_FT
PRINCIPAL DISCHARGE DIAGNOSIS  Diagnosis: Diabetic ketoacidosis  Assessment and Plan of Treatment: you were treated for diabetic ketoacidosis which required an ICU admission as you need to be intubated, and placed on an insulin drip. you were monitor closely in the ICU and once stable where followed by the medicine team. Our endocrinology team saw you and recommend starting diabetes medication as your A1c was extremely elevated. please take your new medicaiton as prescirbe. please also follow up with Dr. Corona upon discharge      SECONDARY DISCHARGE DIAGNOSES  Diagnosis: Altered mental status  Assessment and Plan of Treatment:      PRINCIPAL DISCHARGE DIAGNOSIS  Diagnosis: Diabetic ketoacidosis  Assessment and Plan of Treatment: you were treated for diabetic ketoacidosis which required an ICU admission as you need to be intubated, and placed on an insulin drip. you were monitor closely in the ICU and once stable where followed by the medicine team. Our endocrinology team saw you and recommend starting diabetes medication as your A1c was extremely elevated. please take your new medicaiton as prescirbe. please also follow up with Dr. Corona upon discharge      SECONDARY DISCHARGE DIAGNOSES  Diagnosis: Witnessed seizure  Assessment and Plan of Treatment: EEG completed and reviewed by Dr. TARUN gray.  NO DRIVING is allowed until follow up outpatient with neurology.     PRINCIPAL DISCHARGE DIAGNOSIS  Diagnosis: Diabetic ketoacidosis  Assessment and Plan of Treatment: You were treated for diabetic ketoacidosis which required an ICU admission as you need to be intubated, and placed on an insulin drip. You were monitor closely in the ICU and once stable where followed by the medicine team. Our endocrinology team saw you and recommend starting diabetes medication as your A1c was extremely elevated. please take your new medicaiton as prescirbe. please also follow up with Dr. Corona upon discharge.      SECONDARY DISCHARGE DIAGNOSES  Diagnosis: Witnessed seizure  Assessment and Plan of Treatment: EEG completed and reviewed by Dr. TARUN gray.  NO DRIVING is allowed until follow up outpatient with neurology.

## 2021-09-12 NOTE — DISCHARGE NOTE PROVIDER - HOSPITAL COURSE
53 y/o F w/ PMH of prediabetes was BIBA after being found unresponsive. EMS noted patient with agonal breathing and attempted to intubate in field, unsuccessful. BG >400. in ED, intubated and found to be in DKA. CTH and UTox were negative. Pt was admitted to MICU for further management of DKA. Placed on insulin and bicarb gtt. hospital course complicated by witnessed tonic-clonic seizure in ICU. Neuro consulted. placed on cvEEG. patient extubated 9/9. Gap closed and weaned off insulin gtt. Patient was downgraded to medicine service on 9/10. seizure like in the setting of DKA and not d/t a seizure disorder. per epilepsy will taper off Keppra by 9/12. MRI brain no cont pending. Patient was also seen by Endocrinology as A1c was >16.9 on admission. Patient was placed on basal insulin (lantus) and mod ISS. Per Endocrine, patient to be discharged on Synjardy and outpatient follow up.    55 y/o F w/ PMH of prediabetes was BIBA after being found unresponsive. EMS noted patient with agonal breathing and attempted to intubate in field, unsuccessful. BG >400. in ED, intubated and found to be in DKA. CTH and UTox were negative. Pt was admitted to MICU for further management of DKA. Placed on insulin and bicarb gtt. hospital course complicated by witnessed tonic-clonic seizure in ICU. Neuro consulted. placed on cvEEG. patient extubated 9/9. Gap closed and weaned off insulin gtt. Patient was downgraded to medicine service on 9/10. seizure like in the setting of DKA and not d/t a seizure disorder. per epilepsy will taper off Keppra by 9/12. MRI brain no cont pending. Patient was also seen by Endocrinology as A1c was >16.9 on admission. Patient was placed on basal insulin (lantus) and mod ISS. Per Endocrine, patient to be discharged on Synjardy and outpatient follow up.   All electrolyte abnormalities were monitored carefully and repleted as necessary during this hospitalization. At the time of discharge patient was hemodynamically stable and amenable to all terms of discharge. The patient has received verbal instructions from myself regarding discharge plans.     Length of Discharge: 45MIN     The patient is a 54 year old female with a past medical history of  prediabetes who  was BIBA after being found unresponsive at home.  Per records, patient was reported to be in a pool of urine when she was found.  EMS reported pt had agonal breathing and attempted to intubate her in the field but were unsuccessful  They also reported BG >400.  Patient was still agonal, unresponsive, hypoxic and hypercapnic on arrival to [facility] ED at which time she was intubated by ER.  Initial work up was consistent w/ DKA. Pt was admitted to MICU.  Labs were significant for pH 6.9, serum HCO3 2, undetectable glucose level on FS and serum BG of 676.  Utox and CTH were negative.   She was started on an insulin gtt, HCO3 gtt and aggressively hydrated w/ IVNS.  ICU course was complicated by a witnessed generalized tonic clonic seizure.  She was given versed and loaded w/ keppra.  Neurology was consulted and placed on cEEG.  Pt was successfully extubated 9/9.  Gap now closed and transitioned off insulin gtt to lantus as of last night.  MR of brain seizure protocol showed abnormal signal in the left parietotemporal lobe possible acute CVA vs post seizure focus.     Length of Discharge: 45MIN     The patient is a 54 year old female with a past medical history of  prediabetes who  was BIBA after being found unresponsive at home.  Per records, patient was reported to be in a pool of urine when she was found.  EMS reported pt had agonal breathing and attempted to intubate her in the field but were unsuccessful  They also reported BG >400.  Patient was still agonal, unresponsive, hypoxic and hypercapnic on arrival to [facility] ED at which time she was intubated by ER.  Initial work up was consistent w/ DKA. Pt was admitted to MICU.  Labs were significant for pH 6.9, serum HCO3 2, undetectable glucose level on FS and serum BG of 676.  Utox and CTH were negative.   She was started on an insulin gtt, HCO3 gtt and aggressively hydrated w/ IVNS.  ICU course was complicated by a witnessed generalized tonic clonic seizure.  She was given versed and loaded w/ keppra.  Neurology was consulted and placed on cEEG.  Pt was successfully extubated 9/9. EEG was negative; keppra discontinued. BSL improved on Lantus and admelog TID. MRI and MRA of the brain with IV contrast was done. Seen by PT< discharged home with   RW in stable conidition.    Length of Discharge: 56MIN     The patient is a 54 year old female with a past medical history of  prediabetes who  was BIBA after being found unresponsive at home.  Per records, patient was reported to be in a pool of urine when she was found.  EMS reported pt had agonal breathing and attempted to intubate her in the field but were unsuccessful  They also reported BG >400.  Patient was still agonal, unresponsive, hypoxic and hypercapnic on arrival to [facility] ED at which time she was intubated by ER.  Initial work up was consistent w/ DKA. Pt was admitted to MICU.  Labs were significant for pH 6.9, serum HCO3 2, undetectable glucose level on FS and serum BG of 676.  Utox and CTH were negative.   She was started on an insulin gtt, HCO3 gtt and aggressively hydrated w/ IVNS.  ICU course was complicated by a witnessed generalized tonic clonic seizure.  She was given versed and loaded w/ keppra.  Neurology was consulted and placed on cEEG.  Pt was successfully extubated 9/9. EEG was negative; keppra discontinued. BSL improved on Lantus and admelog TID. MRI and MRA of the brain with IV contrast was done. 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 Pt went for left occipital craniotomy for tumor resection 9/21/21. Pt transferred to ICU post operatively and downgraded to neurosurgery service 9/23/21. Pt seen and examined this morning, reports feeling well and ready to go home. Pt stable from neurosurgical standpoint for discharge.

## 2021-09-12 NOTE — PROGRESS NOTE ADULT - ASSESSMENT
53 y/o F w/ PMH of prediabetes was BIBA after rell found unresponsive at home. She ahd agonal breathing and she was brought to ER where she was intubated and found to have severe DKA, now resolved after standard treatment.  A1c > 16.     DKA w/ coma and severe metabolic acidosis :   - check Bgs actid and hs   - continue lantus   - can discharge on synjardy BID and stop insulin   - use SSI for coverage. ONce she started eating will add meal insulin.   - Will c/w keppra 500mg bid for now     pseudohyponatremia/ hyperNa : continue iV fluids .monitor CMp     FREDRICK / CKD : resolved.

## 2021-09-12 NOTE — DISCHARGE NOTE PROVIDER - NPI NUMBER (FOR SYSADMIN USE ONLY) :
[[id number]],[UNKNOWN] [[id number]],[UNKNOWN],[[id number]] [[id number]],[[id number]],[UNKNOWN],[[id number]]

## 2021-09-12 NOTE — DISCHARGE NOTE PROVIDER - CARE PROVIDER_API CALL
Tere Corona)  EndocrinologyMetabDiabetes  [address], [place] [id number]  Phone: [phone]  Fax: [phone]  Follow Up Time:     primary care Doctor,   Phone: (   )    -  Fax: (   )    -  Follow Up Time:    Glutamine (d/cd Nov 17)  Pain meds--hydromorphone PCA  Continue tube feeds ATC Tere Corona)  EndocrinologyMetabDiabetes  [address] [place], [place] [id number]  Phone: [phone]  Fax: [phone]  Follow Up Time:     primary care Doctor,   Phone: (   )    -  Fax: (   )    -  Follow Up Time:     Prudence Brewer)  EEGEpilepsy; Neurology  [address] [place] [address]  Phone: [phone]  Fax: [phone]  Follow Up Time: 2 weeks   Tere Corona)  EndocrinologyMetabDiabetes  [address] [place], [place] [id number]  Phone: [phone]  Fax: [phone]  Follow Up Time:     Prudence Brewer)  EEGEpilepsy; Neurology  [address] [place] [address]  Phone: [phone]  Fax: [phone]  Follow Up Time: 2 weeks    primary care Doctor,   Phone: (   )    -  Fax: (   )    -  Follow Up Time:     Taiwo Levy; PhD)  Neurosurgery  [address] [place] [address]  Phone: [phone]  Fax: [phone]  Follow Up Time:

## 2021-09-12 NOTE — PROGRESS NOTE ADULT - SUBJECTIVE AND OBJECTIVE BOX
Follow up for unresponsiveness  , DKA , seizure     pt is feeling better today , wants to go home   resting in the bed , awake alert , feels stronger     MEDICATIONS  (STANDING):  aspirin enteric coated 81 milliGRAM(s) Oral daily  benzocaine 15 mG/menthol 3.6 mG (Sugar-Free) Lozenge 1 Lozenge Oral four times a day  chlorhexidine 2% Cloths 1 Application(s) Topical <User Schedule>  dextrose 40% Gel 15 Gram(s) Oral once  dextrose 50% Injectable 25 Gram(s) IV Push once  dextrose 50% Injectable 12.5 Gram(s) IV Push once  dextrose 50% Injectable 25 Gram(s) IV Push once  glucagon  Injectable 1 milliGRAM(s) IntraMuscular once  heparin   Injectable 5000 Unit(s) SubCutaneous every 8 hours  insulin glargine Injectable (LANTUS) 20 Unit(s) SubCutaneous two times a day  insulin lispro (ADMELOG) corrective regimen sliding scale   SubCutaneous Before meals and at bedtime  nystatin Powder 1 Application(s) Topical two times a day  sodium chloride 0.45% with potassium chloride 20 mEq/L 1000 milliLiter(s) (150 mL/Hr) IV Continuous <Continuous>    Vital Signs Last 24 Hrs  T(C): 36.3 (12 Sep 2021 04:56), Max: 36.7 (11 Sep 2021 10:34)  T(F): 97.3 (12 Sep 2021 04:56), Max: 98.1 (11 Sep 2021 10:34)  HR: 71 (12 Sep 2021 04:56) (60 - 71)  BP: 118/76 (12 Sep 2021 04:56) (115/73 - 137/91)  BP(mean): --  RR: 18 (12 Sep 2021 04:56) (18 - 18)  SpO2: 98% (12 Sep 2021 04:56) (98% - 99%)    GENERAL: pt is laying comfortably in bed in NAD  RESPIRATORY: Normal respiratory effort; CTA b/l, no wheezing, rhonchi, rales  CARDIOVASCULAR: RRR , normal S1 and S2, no murmur/rub/gallop  ABDOMEN: soft, obese abd,  NT/ND , normoactive bowel sounds  EXTREMITIES: trace lower extremity edema  PSYCH: slow to respond but answers questions appropriately  Flat affect  SKIN: No rash, dry normal color                         13.3   7.70  )-----------( 129      ( 11 Sep 2021 08:18 )             40.1   09-12    147<H>  |  112<H>  |  9.5  ----------------------------<  99  3.9   |  20.0<L>  |  0.98    Ca    9.0      12 Sep 2021 06:10  Phos  2.4     09-11  Mg     1.9     09-11    TPro  4.9<L>  /  Alb  2.6<L>  /  TBili  0.6  /  DBili  x   /  AST  16  /  ALT  13  /  AlkPhos  70  09-11      CAPILLARY BLOOD GLUCOSE      POCT Blood Glucose.: 87 mg/dL (12 Sep 2021 07:40)  POCT Blood Glucose.: 231 mg/dL (11 Sep 2021 21:35)  POCT Blood Glucose.: 129 mg/dL (11 Sep 2021 16:40)  POCT Blood Glucose.: 136 mg/dL (11 Sep 2021 11:26)                          MEDICATIONS  (STANDING):  aspirin enteric coated 81 milliGRAM(s) Oral daily  benzocaine 15 mG/menthol 3.6 mG (Sugar-Free) Lozenge 1 Lozenge Oral four times a day  chlorhexidine 2% Cloths 1 Application(s) Topical <User Schedule>  dextrose 40% Gel 15 Gram(s) Oral once  dextrose 50% Injectable 25 Gram(s) IV Push once  dextrose 50% Injectable 12.5 Gram(s) IV Push once  dextrose 50% Injectable 25 Gram(s) IV Push once  glucagon  Injectable 1 milliGRAM(s) IntraMuscular once  heparin   Injectable 5000 Unit(s) SubCutaneous every 8 hours  insulin glargine Injectable (LANTUS) 20 Unit(s) SubCutaneous two times a day  insulin lispro (ADMELOG) corrective regimen sliding scale   SubCutaneous Before meals and at bedtime  nystatin Powder 1 Application(s) Topical two times a day  sodium chloride 0.45% with potassium chloride 20 mEq/L 1000 milliLiter(s) (150 mL/Hr) IV Continuous <Continuous>

## 2021-09-12 NOTE — DISCHARGE NOTE PROVIDER - NSDCFUADDAPPT_GEN_ALL_CORE_FT
Instructions for follow-up, activity and diet: It is important to see your primary physician as well as the physicians noted below within the next week to perform a comprehensive medical review.  Call their offices for an appointment as soon as you leave the hospital.  If you do not have a primary physician, contact the [facility] at [place] [phone] located on [address] [place] [address], [place] [address].  Your medical issues appear to be stable at this time, but if your symptoms recur or worsen, contact your physicians and/or return to the hospital if necessary.  If you encounter any issues or questions with your medication, call your physicians before stopping the medication.  Do not drive.  Limit your diet to 2 grams of sodium daily.     Call the office of your PMD to follow up within 3 days of discharge.  Call the office of your endocrinologist to follow up within 3 days of discharge.  Call the office of Dr. Levy to follow up in his office 10 days after discharge.

## 2021-09-12 NOTE — PROGRESS NOTE ADULT - SUBJECTIVE AND OBJECTIVE BOX
INTERVAL HPI/OVERNIGHT EVENTS:  followup for dm2 management.     MEDICATIONS  (STANDING):  aspirin enteric coated 81 milliGRAM(s) Oral daily  benzocaine 15 mG/menthol 3.6 mG (Sugar-Free) Lozenge 1 Lozenge Oral four times a day  chlorhexidine 2% Cloths 1 Application(s) Topical <User Schedule>  dextrose 40% Gel 15 Gram(s) Oral once  dextrose 50% Injectable 25 Gram(s) IV Push once  dextrose 50% Injectable 12.5 Gram(s) IV Push once  dextrose 50% Injectable 25 Gram(s) IV Push once  glucagon  Injectable 1 milliGRAM(s) IntraMuscular once  heparin   Injectable 5000 Unit(s) SubCutaneous every 8 hours  insulin glargine Injectable (LANTUS) 20 Unit(s) SubCutaneous two times a day  insulin lispro (ADMELOG) corrective regimen sliding scale   SubCutaneous Before meals and at bedtime  nystatin Powder 1 Application(s) Topical two times a day    MEDICATIONS  (PRN):  phenol 1.4% (CHLORASEPTIC) Oral Spray 1 Spray(s) Topical every 4 hours PRN sore throat    Allergies  No Known Allergies    Review of systems: nO CP, no SOB, nO HA     Vital Signs Last 24 Hrs  T(C): 36.6 (12 Sep 2021 10:12), Max: 36.6 (12 Sep 2021 10:12)  T(F): 97.9 (12 Sep 2021 10:12), Max: 97.9 (12 Sep 2021 10:12)  HR: 85 (12 Sep 2021 10:12) (60 - 85)  BP: 108/74 (12 Sep 2021 10:12) (108/74 - 137/91)  BP(mean): --  RR: 18 (12 Sep 2021 10:12) (18 - 18)  SpO2: 98% (12 Sep 2021 10:12) (98% - 99%)    PHYSICAL EXAM:  GENERAL: pt is laying comfortably in bed in NAD  RESPIRATORY: Normal respiratory effort; CTA b/l, no wheezing, rhonchi, rales  CARDIOVASCULAR: RRR , normal S1 and S2, no murmur/rub/gallop  ABDOMEN: soft, obese abd,  NT/ND   EXTREMITIES: trace lower extremity edema  PSYCH: slow to respond but answers questions appropriately  Flat affect  SKIN: No rash, dry normal color                LABS:                        13.3   7.70  )-----------( 129      ( 11 Sep 2021 08:18 )             40.1     09-12    147<H>  |  112<H>  |  9.5  ----------------------------<  99  3.9   |  20.0<L>  |  0.98    Ca    9.0      12 Sep 2021 06:10  Phos  2.4     09-11  Mg     1.9     09-11    TPro  4.9<L>  /  Alb  2.6<L>  /  TBili  0.6  /  DBili  x   /  AST  16  /  ALT  13  /  AlkPhos  70  09-11      CAPILLARY BLOOD GLUCOSE  POCT Blood Glucose.: 261 mg/dL (12 Sep 2021 11:29)  POCT Blood Glucose.: 87 mg/dL (12 Sep 2021 07:40)  POCT Blood Glucose.: 231 mg/dL (11 Sep 2021 21:35)  POCT Blood Glucose.: 129 mg/dL (11 Sep 2021 16:40)

## 2021-09-12 NOTE — PROGRESS NOTE ADULT - ASSESSMENT
53 y/o F w/ PMH of prediabetes was BIBA after rell found unresponsive at home.  Per records, pt was reported to be in a pool of urine when she was found.  EMS reported pt had agonal breathing and attempted to intubate her in the field but were unsussessful.  They also reported BG >400.  Pt was still agonal, unresponsive, hypoxic and hypercapnic on arrival to [facility] ED at which time she was intubated by ER.  Initial w/u was consistent w/ DKA. Pt was admitted to MICU.  Labs were significant for pH 6.9, serum HCO3 2, undetectable glucose level on FS and serum BG of 676.  Utox and CTH were negative.   She was started on an insulin gtt, HCO3 gtt and aggressively hydrated w/ IVNS.  ICU course was complicated by a witnessed generalized tonic clonic seizure.  She was given versed and loaded w/ keppra.  Neurology was consulted and placed on cEEG.  Pt was successfully extubated 9/9.  Gap now closed and transitioned off insulin gtt to lantus as of last night.          1-DKA w/ coma and severe metabolic acidosis   -s/p intubation  cont BG monitoring , cont lantus and admelog   endocrinology consult appreciated       2- Seizure , neurology consulted   s/p EEG ;  report reviewed and is consistent w/ mild to moderate diffuse cerebral dysfunction but no epileptiform pattern or seizures seen   c/w keppra 500mg bid for now   - MRI brain epilepsy protocol w/o contrast ordered P   will call MR      3-FREDRICK , cr is normal   resolved   s/p IVF , will stop iv hydration     4-Hypernatremia / Hyperchloremia   improving   encourage oral fluid intake   encourage oral fluid intake     5-Profound generalized weakness  MR ordered P   PT mobilize     discharge in 1-2 days P MR and neurology follow up       VTE ppx: lovenox

## 2021-09-13 LAB
CULTURE RESULTS: SIGNIFICANT CHANGE UP
CULTURE RESULTS: SIGNIFICANT CHANGE UP
GLUCOSE BLDC GLUCOMTR-MCNC: 203 MG/DL — HIGH (ref 70–99)
GLUCOSE BLDC GLUCOMTR-MCNC: 213 MG/DL — HIGH (ref 70–99)
GLUCOSE BLDC GLUCOMTR-MCNC: 224 MG/DL — HIGH (ref 70–99)
GLUCOSE BLDC GLUCOMTR-MCNC: 250 MG/DL — HIGH (ref 70–99)
GLUCOSE BLDC GLUCOMTR-MCNC: 90 MG/DL — SIGNIFICANT CHANGE UP (ref 70–99)
SPECIMEN SOURCE: SIGNIFICANT CHANGE UP
SPECIMEN SOURCE: SIGNIFICANT CHANGE UP

## 2021-09-13 PROCEDURE — 76377 3D RENDER W/INTRP POSTPROCES: CPT | Mod: 26

## 2021-09-13 PROCEDURE — 95718 EEG PHYS/QHP 2-12 HR W/VEEG: CPT

## 2021-09-13 PROCEDURE — 99233 SBSQ HOSP IP/OBS HIGH 50: CPT

## 2021-09-13 PROCEDURE — 99232 SBSQ HOSP IP/OBS MODERATE 35: CPT

## 2021-09-13 PROCEDURE — 71045 X-RAY EXAM CHEST 1 VIEW: CPT | Mod: 26

## 2021-09-13 PROCEDURE — 70551 MRI BRAIN STEM W/O DYE: CPT | Mod: 26

## 2021-09-13 RX ORDER — INSULIN GLARGINE 100 [IU]/ML
10 INJECTION, SOLUTION SUBCUTANEOUS AT BEDTIME
Refills: 0 | Status: DISCONTINUED | OUTPATIENT
Start: 2021-09-13 | End: 2021-09-14

## 2021-09-13 RX ORDER — JNJ-78436735 50000000000 [PFU]/.5ML
0.5 SUSPENSION INTRAMUSCULAR ONCE
Refills: 0 | Status: COMPLETED | OUTPATIENT
Start: 2021-09-13 | End: 2021-09-13

## 2021-09-13 RX ORDER — INSULIN GLARGINE 100 [IU]/ML
20 INJECTION, SOLUTION SUBCUTANEOUS EVERY MORNING
Refills: 0 | Status: DISCONTINUED | OUTPATIENT
Start: 2021-09-13 | End: 2021-09-14

## 2021-09-13 RX ORDER — INSULIN LISPRO 100/ML
5 VIAL (ML) SUBCUTANEOUS
Refills: 0 | Status: DISCONTINUED | OUTPATIENT
Start: 2021-09-13 | End: 2021-09-21

## 2021-09-13 RX ADMIN — HEPARIN SODIUM 5000 UNIT(S): 5000 INJECTION INTRAVENOUS; SUBCUTANEOUS at 13:18

## 2021-09-13 RX ADMIN — Medication 4: at 17:13

## 2021-09-13 RX ADMIN — NYSTATIN CREAM 1 APPLICATION(S): 100000 CREAM TOPICAL at 05:46

## 2021-09-13 RX ADMIN — BENZOCAINE AND MENTHOL 1 LOZENGE: 5; 1 LIQUID ORAL at 17:17

## 2021-09-13 RX ADMIN — BENZOCAINE AND MENTHOL 1 LOZENGE: 5; 1 LIQUID ORAL at 05:45

## 2021-09-13 RX ADMIN — HEPARIN SODIUM 5000 UNIT(S): 5000 INJECTION INTRAVENOUS; SUBCUTANEOUS at 05:45

## 2021-09-13 RX ADMIN — Medication 4: at 11:50

## 2021-09-13 RX ADMIN — JNJ-78436735 0.5 MILLILITER(S): 50000000000 SUSPENSION INTRAMUSCULAR at 13:19

## 2021-09-13 RX ADMIN — INSULIN GLARGINE 20 UNIT(S): 100 INJECTION, SOLUTION SUBCUTANEOUS at 08:57

## 2021-09-13 RX ADMIN — HEPARIN SODIUM 5000 UNIT(S): 5000 INJECTION INTRAVENOUS; SUBCUTANEOUS at 22:59

## 2021-09-13 RX ADMIN — Medication 5 UNIT(S): at 17:10

## 2021-09-13 RX ADMIN — Medication 5 UNIT(S): at 11:30

## 2021-09-13 RX ADMIN — NYSTATIN CREAM 1 APPLICATION(S): 100000 CREAM TOPICAL at 17:17

## 2021-09-13 RX ADMIN — BENZOCAINE AND MENTHOL 1 LOZENGE: 5; 1 LIQUID ORAL at 11:55

## 2021-09-13 RX ADMIN — Medication 81 MILLIGRAM(S): at 11:50

## 2021-09-13 RX ADMIN — INSULIN GLARGINE 10 UNIT(S): 100 INJECTION, SOLUTION SUBCUTANEOUS at 22:59

## 2021-09-13 RX ADMIN — CHLORHEXIDINE GLUCONATE 1 APPLICATION(S): 213 SOLUTION TOPICAL at 05:46

## 2021-09-13 RX ADMIN — Medication 4: at 23:00

## 2021-09-13 NOTE — ADVANCED PRACTICE NURSE CONSULT - RECOMMEDATIONS
continue diabetes self management education  pt to demonstrate correct use of inuslin pen and glucose meter  pls consider decrease lantus to 10 units tonight  add admelog 5 units before meals  glucose meter strips and lancets for 4 xdaily  cc- pls task pt to diabetes wellness out pt

## 2021-09-13 NOTE — PROGRESS NOTE ADULT - ASSESSMENT
The patient is a 56y Female with seizure in setting of DKA.     Seizure.   Off Keppra now.   To repeat c-EEG.     Abnormal EEG.  The differential diagnosis includes stroke and post-seizure focus.  Agree with contrast MRI and MRA.     Case discussed with Dr Rivas.

## 2021-09-13 NOTE — PROGRESS NOTE ADULT - SUBJECTIVE AND OBJECTIVE BOX
INTERVAL HPI/OVERNIGHT EVENTS:  management of diabetes.    MEDICATIONS  (STANDING):  aspirin enteric coated 81 milliGRAM(s) Oral daily  benzocaine 15 mG/menthol 3.6 mG (Sugar-Free) Lozenge 1 Lozenge Oral four times a day  chlorhexidine 2% Cloths 1 Application(s) Topical <User Schedule>  coronavirus (EUA) Vaccine ([employer]) 0.5 milliLiter(s) IntraMuscular once  dextrose 40% Gel 15 Gram(s) Oral once  dextrose 50% Injectable 25 Gram(s) IV Push once  dextrose 50% Injectable 12.5 Gram(s) IV Push once  dextrose 50% Injectable 25 Gram(s) IV Push once  glucagon  Injectable 1 milliGRAM(s) IntraMuscular once  heparin   Injectable 5000 Unit(s) SubCutaneous every 8 hours  insulin glargine Injectable (LANTUS) 20 Unit(s) SubCutaneous every morning  insulin glargine Injectable (LANTUS) 10 Unit(s) SubCutaneous at bedtime  insulin lispro (ADMELOG) corrective regimen sliding scale   SubCutaneous Before meals and at bedtime  insulin lispro Injectable (ADMELOG) 5 Unit(s) SubCutaneous three times a day before meals  nystatin Powder 1 Application(s) Topical two times a day    MEDICATIONS  (PRN):  phenol 1.4% (CHLORASEPTIC) Oral Spray 1 Spray(s) Topical every 4 hours PRN sore throat      Allergies    No Known Allergies      Review of systems:  no cp, no sob, no n/v    Vital Signs Last 24 Hrs  T(C): 37.1 (13 Sep 2021 04:47), Max: 37.1 (13 Sep 2021 04:47)  T(F): 98.7 (13 Sep 2021 04:47), Max: 98.7 (13 Sep 2021 04:47)  HR: 71 (13 Sep 2021 04:47) (71 - 76)  BP: 114/79 (13 Sep 2021 04:47) (114/79 - 114/83)  BP(mean): --  RR: 18 (13 Sep 2021 04:47) (18 - 18)  SpO2: 97% (13 Sep 2021 04:47) (97% - 97%)    PHYSICAL EXAM:  Constitutional: NAD, well-groomed, well-developed  Respiratory: CTAB  Cardiovascular: S1 and S2, RRR, no M/G/R  Gastrointestinal: BS+, soft, no organomegaly or mass  Extremities: No peripheral edema, no pedal lesions  Psychiatric: Normal mood, normal affect  Skin: No rashes, no acanthosis        LABS:    09-12    147<H>  |  112<H>  |  9.5  ----------------------------<  99  3.9   |  20.0<L>  |  0.98    Ca    9.0      12 Sep 2021 06:10

## 2021-09-13 NOTE — PROGRESS NOTE ADULT - SUBJECTIVE AND OBJECTIVE BOX
[facility]                                        Neurology at [place]                                 [facility] Anupam                                  [address]                                        [place], [place], [address]                                             [phone]        CC: seizure     HPI:   55 yo woman with DKA and witnessed generalized tonic clonic seizures night of admission, unclear if patient has epilepsy history, however, she was admitted in 8/2020 without a documented history of seizures at that time    Interim history:  On 6 [place].   No seizures since admission.     ROS:   Denies headache or dizziness.  Denies chest pain.  Denies shortness of breath.    MEDICATIONS  (STANDING):  aspirin enteric coated 81 milliGRAM(s) Oral daily  benzocaine 15 mG/menthol 3.6 mG (Sugar-Free) Lozenge 1 Lozenge Oral four times a day  chlorhexidine 2% Cloths 1 Application(s) Topical <User Schedule>  dextrose 40% Gel 15 Gram(s) Oral once  glucagon  Injectable 1 milliGRAM(s) IntraMuscular once  heparin   Injectable 5000 Unit(s) SubCutaneous every 8 hours  insulin glargine Injectable (LANTUS) 20 Unit(s) SubCutaneous every morning  insulin glargine Injectable (LANTUS) 10 Unit(s) SubCutaneous at bedtime  insulin lispro (ADMELOG) corrective regimen sliding scale   SubCutaneous Before meals and at bedtime  insulin lispro Injectable (ADMELOG) 5 Unit(s) SubCutaneous three times a day before meals  nystatin Powder 1 Application(s) Topical two times a day      Vital Signs Last 24 Hrs  T(C): 37.1 (13 Sep 2021 04:47), Max: 37.1 (13 Sep 2021 04:47)  T(F): 98.7 (13 Sep 2021 04:47), Max: 98.7 (13 Sep 2021 04:47)  HR: 71 (13 Sep 2021 04:47) (71 - 76)  BP: 114/79 (13 Sep 2021 04:47) (114/79 - 114/83)  RR: 18 (13 Sep 2021 04:47) (18 - 18)  SpO2: 97% (13 Sep 2021 04:47) (97% - 97%)    Detailed Neurologic Exam:    Mental status: The patient is awake and alert. There is no aphasia. There is no dysarthria.     Cranial nerves: Pupils equal and react symmetrically to light. There is no visual field deficit to threat. Extraocular motion is full with no nystagmus. Facial sensation is intact. Facial musculature is symmetric. Palate elevates symmetrically. Tongue is midline.    Motor: There is normal bulk and tone.  There is no tremor.  Strength grossly 5/5 bilaterally.    Sensation: Grossly intact to light touch and pin.    Reflexes: 2+ throughout and plantar responses are flexor.    Cerebellar: No dysmetria on finger nose testing.    Labs:     09-12    147<H>  |  112<H>  |  9.5  ----------------------------<  99  3.9   |  20.0<L>  |  0.98    Ca    9.0      12 Sep 2021 06:10            Rad:   MRI brain images reviewed (and concur with report): There is an area of abnormal signal in the left parieto-temporal area. The differential diagnosis includes stroke and post-seizure focus.

## 2021-09-13 NOTE — PROGRESS NOTE ADULT - SUBJECTIVE AND OBJECTIVE BOX
[facility]                                                                     MD Leda Desai DO                                              Epilepsy Consult #: [phone] ([phone])                                               Office: [address] [place] [address], [address]                                                 Phone: [phone]; Fax: [phone]                            ==============================================    EPILEPSY FOLLOW-UP NOTE      INTERVAL HISTORY:  MRI brain today revealed T2/FLAIR hyperintensity involving the left parietotemporal lobe with underlying diffusion restriction. No deficit noted on exam.    MEDICATIONS:   aspirin enteric coated 81 milliGRAM(s) Oral daily  benzocaine 15 mG/menthol 3.6 mG (Sugar-Free) Lozenge 1 Lozenge Oral four times a day  chlorhexidine 2% Cloths 1 Application(s) Topical <User Schedule>  dextrose 40% Gel 15 Gram(s) Oral once  dextrose 50% Injectable 25 Gram(s) IV Push once  dextrose 50% Injectable 12.5 Gram(s) IV Push once  dextrose 50% Injectable 25 Gram(s) IV Push once  glucagon  Injectable 1 milliGRAM(s) IntraMuscular once  heparin   Injectable 5000 Unit(s) SubCutaneous every 8 hours  insulin glargine Injectable (LANTUS) 20 Unit(s) SubCutaneous every morning  insulin glargine Injectable (LANTUS) 10 Unit(s) SubCutaneous at bedtime  insulin lispro (ADMELOG) corrective regimen sliding scale   SubCutaneous Before meals and at bedtime  insulin lispro Injectable (ADMELOG) 5 Unit(s) SubCutaneous three times a day before meals  nystatin Powder 1 Application(s) Topical two times a day  phenol 1.4% (CHLORASEPTIC) Oral Spray 1 Spray(s) Topical every 4 hours PRN sore throat    LAST 24-HR VITALS:  T(C): 36.5 (09-13-21 @ 17:14), Max: 37.1 (09-13-21 @ 04:47)  HR: 76 (09-13-21 @ 17:14) (71 - 76)  BP: 122/82 (09-13-21 @ 17:14) (114/79 - 122/82)  ABP: --  RR: 18 (09-13-21 @ 17:14) (18 - 18)  SpO2: 100% (09-13-21 @ 17:14) (97% - 100%)  CVP(cm H2O): --    GENERAL PHYSICAL EXAM:  GEN: no distress   HEENT: NCAT, OP clear  EYES: sclera white, conjunctiva clear, no nystagmus  NECK: supple  CV: RRR, no murmur     		  PULM: CTAB, no wheezing  ABD: soft, +BS, NT  EXT: peripheral pulse intact, no cyanosis  MSK: muscle tone and strength normal  SKIN: warm, dry    NEUROLOGICAL EXAM:  Mental Status  Orientation: alert and oriented to person, place, time, and situation   Language: clear and fluent    Cranial Nerves  II: full visual fields intact   III, IV, VI: PERRL, EOMI  V, VII: facial sensation and movement intact and symmetric   VIII: hearing intact   IX, X: uvula midline, soft palate elevates normally   XI: BL shoulder shrug intact   XII: tongue midline    Motor  5/5 BUE and BLE                 Tone and bulk are normal in upper and lower limbs  No pronator drift    Sensation  Intact to light touch and pinprick in all 4 EXTs    Reflex  2+ in BL biceps and patella                                    Plantar responses downward bilaterally    Coordination  Normal FTN bilaterally    Gait  Deferred       LABS:      09-12    147<H>  |  112<H>  |  9.5  ----------------------------<  99  3.9   |  20.0<L>  |  0.98    Ca    9.0      12 Sep 2021 06:10      CAPILLARY BLOOD GLUCOSE      POCT Blood Glucose.: 224 mg/dL (13 Sep 2021 17:12)  POCT Blood Glucose.: 250 mg/dL (13 Sep 2021 16:10)  POCT Blood Glucose.: 213 mg/dL (13 Sep 2021 11:49)  POCT Blood Glucose.: 90 mg/dL (13 Sep 2021 07:34)        OTHER IMAGING AND STUDIES:    cvEEG 9/9 - 9/10/21:  EEG SUMMARY/CLASSIFICATION    Abnormal EEG  - Mild to moderate generalized slowing.    _____________________________________________________________  EEG IMPRESSION/CLINICAL CORRELATE    This EEG is consistent with mild to moderate diffuse cerebral dysfunction.  No epileptiform pattern or seizure seen.      cvEE 9/8 - 9/9/21:  EEG SUMMARY/CLASSIFICATION    Abnormal EEG in a sedated patient.  - Diffuse excessive beta activity.  - Severe generalized slowing.    _____________________________________________________________  EEG IMPRESSION/CLINICAL CORRELATE    This EEG is consistent with severe diffuse cerebral dysfunction.  Diffuse excessive beta activity is likely benzodiazepine side effect.  Sinus tachycardia on single lead ECG  No epileptiform pattern or seizure seen.      < from: MR Brain-Seizure, Epilepsy No Cont (09.13.21 @ 11:42) >  FINDINGS:    The ventricular and sulcal size and configuration is age appropriate. There is a T2/FLAIR hyperintensity involving the left parietotemporal lobe with underlying diffusion restriction. The amount of T2/FLAIR hyperintensity involves both the gray and white matter. There was no abnormality noted in the comparison CT.    There is no intracranial hemorrhage, midline shift or herniation. There is no abnormal extra axial fluid collection or hydrocephalus. There is no abnormal signal in the mesial temporal cortices.    The flow-voids of the major central arterial circulation at the skull base are normal, suggestive of of their patency.    The visualized globes are symmetric bilaterally.    The visualized paranasal sinuses and mastoid bones are adequately developed and aerated.      IMPRESSION:    Abnormal signal in the left parietotemporal lobe most likely represents an acute infarction. There is no hemorrhagic conversion. There is no midline shift or herniation.    Additional differential diagnostic consideration is post seizure focus. Further correlation with MRI of the brain with contrast is suggested to exclude underlying lesion.

## 2021-09-13 NOTE — PROGRESS NOTE ADULT - SUBJECTIVE AND OBJECTIVE BOX
Follow up for unresponsiveness  , DKA , seizure     pt is feeling better today , wants to go home   she is with c/o cough , rash in pubic area     no resp distress , no dyspnea       Vital Signs Last 24 Hrs  T(C): 37.1 (13 Sep 2021 04:47), Max: 37.1 (13 Sep 2021 04:47)  T(F): 98.7 (13 Sep 2021 04:47), Max: 98.7 (13 Sep 2021 04:47)  HR: 71 (13 Sep 2021 04:47) (71 - 76)  BP: 114/79 (13 Sep 2021 04:47) (114/79 - 114/83)  BP(mean): --  RR: 18 (13 Sep 2021 04:47) (18 - 18)  SpO2: 97% (13 Sep 2021 04:47) (97% - 97%)      GENERAL: pt is laying comfortably in bed in NAD  RESPIRATORY: Normal respiratory effort; CTA b/l, no wheezing  CARDIOVASCULAR: RRR , normal S1 and S2, no murmur/rub/gallop  ABDOMEN: soft, obese abd,  NT/ND , normoactive bowel sounds  EXTREMITIES: trace lower extremity edema  PSYCH: slow to respond but answers questions appropriately  Flat affect  SKIN: pubic , thigh rash redness in skin , no vulval swelling or redness     09-12    147<H>  |  112<H>  |  9.5  ----------------------------<  99  3.9   |  20.0<L>  |  0.98    Ca    9.0      12 Sep 2021 06:10     2021 11:26)          CAPILLARY BLOOD GLUCOSE      POCT Blood Glucose.: 213 mg/dL (13 Sep 2021 11:49)  POCT Blood Glucose.: 90 mg/dL (13 Sep 2021 07:34)  POCT Blood Glucose.: 206 mg/dL (12 Sep 2021 21:42)  POCT Blood Glucose.: 210 mg/dL (12 Sep 2021 16:37)

## 2021-09-13 NOTE — PROGRESS NOTE ADULT - ASSESSMENT
53 y/o F w/ PMH of prediabetes was BIBA after rell found unresponsive at home. She had agonal breathing and she was brought to ER where she was intubated and found to have severe DKA, now resolved after standard treatment.  A1c > 16%     DKA w/ coma and severe metabolic acidosis :   - check Bgs ac tid and hs   -sugars better now, to back off on dose of  lantus  to 18 units bid   -Once she started eating will add meal insulin, may give lispro 5 units tid with meals .  - continue SSI for coverage.  - can discharge on synjardy BID and stop insulin   - Will c/w keppra 500mg bid for now     pseudohyponatremia/ hyperNa : continue iV fluids .monitor CMp     FREDRICK / CKD : resolved.

## 2021-09-13 NOTE — PROGRESS NOTE ADULT - ASSESSMENT
57yo female with history of DM presented with severe DKA (BS in >500s) and new onset seizure. Personally reviewed all imagines, labs, EEG and other studies.    Impression:  1. New onset seizure: Likely hyperglycemia-induced. No prior history of seizure. Prior 48hr EEG without epileptiform discharges.    2. Abnormal MRI: T2/FLAIR hyperintensity and diffusion restriction in the left parietotemporal lobe - acute infarct VS changes associated with hygerglycemia/sz?  Article on MRI findings seen in hyperglycemia-induced seizures: https://pubmed.ncbi.nlm.nih.gov/38555329/    3. Severe DKA: Resolved.      Recommendation:  - repeat cvEEG  - hold antiepileptic medication at this time   - MRI brain w/, MRA H/N w/ pending  - Dr. Wilson following to complete stroke workup  - endo to manage DM  - seizure precaution        Will continue to follow with you.   ______________________  Prudence Brewer MD   Director, Epilepsy/EMU - [facility]   Epilepsy Consult #: [phone] ([phone])

## 2021-09-13 NOTE — PROGRESS NOTE ADULT - ASSESSMENT
55 y/o F w/ PMH of prediabetes was BIBA after rell found unresponsive at home.  Per records, pt was reported to be in a pool of urine when she was found.  EMS reported pt had agonal breathing and attempted to intubate her in the field but were unsussessful.  They also reported BG >400.  Pt was still agonal, unresponsive, hypoxic and hypercapnic on arrival to [facility] ED at which time she was intubated by ER.  Initial w/u was consistent w/ DKA. Pt was admitted to MICU.  Labs were significant for pH 6.9, serum HCO3 2, undetectable glucose level on FS and serum BG of 676.  Utox and CTH were negative.   She was started on an insulin gtt, HCO3 gtt and aggressively hydrated w/ IVNS.  ICU course was complicated by a witnessed generalized tonic clonic seizure.  She was given versed and loaded w/ keppra.  Neurology was consulted and placed on cEEG.  Pt was successfully extubated 9/9.  Gap now closed and transitioned off insulin gtt to lantus as of last night.  MR of brain ordered , BG is better controlled         1-DKA w/ coma and severe metabolic acidosis   -s/p intubation  cont BG monitoring , cont lantus and admelog  BG is better , DKA is resolved    endocrinology follow up appreciated     2- Seizure activity , neurology on board   s/p EEG ;  report reviewed and is consistent w/ mild to moderate diffuse cerebral dysfunction but no epileptiform pattern or seizures seen   c/w keppra 500mg bid for now    MRI brain epilepsy protocol w/o contrast ordered P       3-FREDRICK ,resolved    cr is normal   s/p IVF   encourage   oral fluid intake     4-Hypernatremia / Hyperchloremia   improving   encourage oral fluid intake     5-Profound generalized weakness, improving   PT to reasses   MR ordered P         discharge soon       VTE ppx: lovenox

## 2021-09-13 NOTE — ADVANCED PRACTICE NURSE CONSULT - ASSESSMENT
went to see pt in am pt is a+ox3 c/o 0 pain. pt states that about a year ago she was dc from a hospital w insulin pens. her pcp changed her to pills which was stopped in november 2020 because she has boarder line diabetes. pt was educated about diabetes disease process and result of hemoglobin a1c and the importance of using insulin @ this time. she does nt check bg and does not have glucose meter. pt verbalized understanding. she was feeling bad for a while but thought it is due to family related stress, until she passed out and woke up in the icu. pt was educated about ss of hypoglycemia and treatment and was able to verbalize understanding.pt was educated about healthy eating habits, the plate method was used to teach portions, she was advised not to skip meals . pt has a fu appointment with dr krause's office on september 29, 2021 at 0930a. written material regarding diabetes self management provided.

## 2021-09-14 LAB
ANION GAP SERPL CALC-SCNC: 13 MMOL/L — SIGNIFICANT CHANGE UP (ref 5–17)
BUN SERPL-MCNC: 12.3 MG/DL — SIGNIFICANT CHANGE UP (ref 8–20)
CALCIUM SERPL-MCNC: 9.6 MG/DL — SIGNIFICANT CHANGE UP (ref 8.6–10.2)
CHLORIDE SERPL-SCNC: 102 MMOL/L — SIGNIFICANT CHANGE UP (ref 98–107)
CO2 SERPL-SCNC: 25 MMOL/L — SIGNIFICANT CHANGE UP (ref 22–29)
CREAT SERPL-MCNC: 1.07 MG/DL — SIGNIFICANT CHANGE UP (ref 0.5–1.3)
GLUCOSE BLDC GLUCOMTR-MCNC: 158 MG/DL — HIGH (ref 70–99)
GLUCOSE BLDC GLUCOMTR-MCNC: 180 MG/DL — HIGH (ref 70–99)
GLUCOSE BLDC GLUCOMTR-MCNC: 269 MG/DL — HIGH (ref 70–99)
GLUCOSE BLDC GLUCOMTR-MCNC: 357 MG/DL — HIGH (ref 70–99)
GLUCOSE SERPL-MCNC: 181 MG/DL — HIGH (ref 70–99)
HCT VFR BLD CALC: 36.6 % — SIGNIFICANT CHANGE UP (ref 34.5–45)
HGB BLD-MCNC: 12.1 G/DL — SIGNIFICANT CHANGE UP (ref 11.5–15.5)
MCHC RBC-ENTMCNC: 30.4 PG — SIGNIFICANT CHANGE UP (ref 27–34)
MCHC RBC-ENTMCNC: 33.1 GM/DL — SIGNIFICANT CHANGE UP (ref 32–36)
MCV RBC AUTO: 92 FL — SIGNIFICANT CHANGE UP (ref 80–100)
PLATELET # BLD AUTO: 205 K/UL — SIGNIFICANT CHANGE UP (ref 150–400)
POTASSIUM SERPL-MCNC: 3.9 MMOL/L — SIGNIFICANT CHANGE UP (ref 3.5–5.3)
POTASSIUM SERPL-SCNC: 3.9 MMOL/L — SIGNIFICANT CHANGE UP (ref 3.5–5.3)
RBC # BLD: 3.98 M/UL — SIGNIFICANT CHANGE UP (ref 3.8–5.2)
RBC # FLD: 15.3 % — HIGH (ref 10.3–14.5)
SODIUM SERPL-SCNC: 140 MMOL/L — SIGNIFICANT CHANGE UP (ref 135–145)
WBC # BLD: 7.3 K/UL — SIGNIFICANT CHANGE UP (ref 3.8–10.5)
WBC # FLD AUTO: 7.3 K/UL — SIGNIFICANT CHANGE UP (ref 3.8–10.5)

## 2021-09-14 PROCEDURE — 99232 SBSQ HOSP IP/OBS MODERATE 35: CPT

## 2021-09-14 PROCEDURE — 99233 SBSQ HOSP IP/OBS HIGH 50: CPT

## 2021-09-14 RX ORDER — INSULIN GLARGINE 100 [IU]/ML
25 INJECTION, SOLUTION SUBCUTANEOUS EVERY MORNING
Refills: 0 | Status: DISCONTINUED | OUTPATIENT
Start: 2021-09-14 | End: 2021-09-20

## 2021-09-14 RX ADMIN — Medication 81 MILLIGRAM(S): at 13:49

## 2021-09-14 RX ADMIN — BENZOCAINE AND MENTHOL 1 LOZENGE: 5; 1 LIQUID ORAL at 00:24

## 2021-09-14 RX ADMIN — Medication 2: at 06:52

## 2021-09-14 RX ADMIN — HEPARIN SODIUM 5000 UNIT(S): 5000 INJECTION INTRAVENOUS; SUBCUTANEOUS at 21:26

## 2021-09-14 RX ADMIN — BENZOCAINE AND MENTHOL 1 LOZENGE: 5; 1 LIQUID ORAL at 23:48

## 2021-09-14 RX ADMIN — Medication 5 UNIT(S): at 18:13

## 2021-09-14 RX ADMIN — BENZOCAINE AND MENTHOL 1 LOZENGE: 5; 1 LIQUID ORAL at 06:51

## 2021-09-14 RX ADMIN — CHLORHEXIDINE GLUCONATE 1 APPLICATION(S): 213 SOLUTION TOPICAL at 06:51

## 2021-09-14 RX ADMIN — Medication 6: at 21:26

## 2021-09-14 RX ADMIN — NYSTATIN CREAM 1 APPLICATION(S): 100000 CREAM TOPICAL at 06:51

## 2021-09-14 RX ADMIN — HEPARIN SODIUM 5000 UNIT(S): 5000 INJECTION INTRAVENOUS; SUBCUTANEOUS at 06:52

## 2021-09-14 RX ADMIN — NYSTATIN CREAM 1 APPLICATION(S): 100000 CREAM TOPICAL at 18:15

## 2021-09-14 RX ADMIN — Medication 5 UNIT(S): at 06:53

## 2021-09-14 RX ADMIN — INSULIN GLARGINE 20 UNIT(S): 100 INJECTION, SOLUTION SUBCUTANEOUS at 09:21

## 2021-09-14 RX ADMIN — Medication 10: at 18:15

## 2021-09-14 RX ADMIN — HEPARIN SODIUM 5000 UNIT(S): 5000 INJECTION INTRAVENOUS; SUBCUTANEOUS at 13:49

## 2021-09-14 NOTE — PROGRESS NOTE ADULT - ASSESSMENT
The patient is a 54 year old female with a past medical history of  prediabetes who  was BIBA after being found unresponsive at home.  Per records, patient was reported to be in a pool of urine when she was found.  EMS reported pt had agonal breathing and attempted to intubate her in the field but were unsuccessful  They also reported BG >400.  Patient was still agonal, unresponsive, hypoxic and hypercapnic on arrival to [facility] ED at which time she was intubated by ER.  Initial work up was consistent w/ DKA. Pt was admitted to MICU.  Labs were significant for pH 6.9, serum HCO3 2, undetectable glucose level on FS and serum BG of 676.  Utox and CTH were negative.   She was started on an insulin gtt, HCO3 gtt and aggressively hydrated w/ IVNS.  ICU course was complicated by a witnessed generalized tonic clonic seizure.  She was given versed and loaded w/ keppra.  Neurology was consulted and placed on cEEG.  Pt was successfully extubated 9/9.  Gap now closed and transitioned off insulin gtt to lantus as of last night.  MR of brain seizure protocol showed abnormal signal in the left parietotemporal lobe possible acute CVA vs post seizure focus    Assessment/Plan:    1. DKA with coma and severe metabolic acidosis on admission: Now resolved  BSL improved  Hbaic of 16  Will need insulin on discharge  Discontinue lantus at bedtime  Continue 20 units of Lantus in AM with ademlog 5 units TID with meals   Monitor BSL closely for adjustment  Diabetes education    2. Witnessed seizure: no events captured on vEEG  Spoke with Dr Brewer, EEG continued. Discontinue Keppra  No Driving until follow up with neurology as outpatient    3. Abnormal MRI with possible stroke: No deficits noted on exam  MRI of the brain and MRA pending with IV contrast  Neurology following    4. FREDRICK secondary to DKA on admission resolved    VTE- Lovenox subcut    Code Status; Full Code    Discharge disposition: PT follow up. Discharge home pending MRI and MRA results.

## 2021-09-14 NOTE — PROGRESS NOTE ADULT - SUBJECTIVE AND OBJECTIVE BOX
[facility]                                                                     MD Leda Desai DO                                              Epilepsy Consult #: [phone] ([phone])                                               Office: [address] [place] [address], [address]                                                 Phone: [phone]; Fax: [phone]                            ==============================================    EPILEPSY FOLLOW-UP NOTE      INTERVAL HISTORY:  Continuous video EEG normal overnight.    MEDICATIONS  (STANDING):  aspirin enteric coated 81 milliGRAM(s) Oral daily  benzocaine 15 mG/menthol 3.6 mG (Sugar-Free) Lozenge 1 Lozenge Oral four times a day  dextrose 40% Gel 15 Gram(s) Oral once  dextrose 50% Injectable 25 Gram(s) IV Push once  dextrose 50% Injectable 12.5 Gram(s) IV Push once  dextrose 50% Injectable 25 Gram(s) IV Push once  glucagon  Injectable 1 milliGRAM(s) IntraMuscular once  heparin   Injectable 5000 Unit(s) SubCutaneous every 8 hours  insulin glargine Injectable (LANTUS) 25 Unit(s) SubCutaneous every morning  insulin lispro (ADMELOG) corrective regimen sliding scale   SubCutaneous Before meals and at bedtime  insulin lispro Injectable (ADMELOG) 5 Unit(s) SubCutaneous three times a day before meals  nystatin Powder 1 Application(s) Topical two times a day    Vital Signs Last 24 Hrs  T(C): 36.7 (14 Sep 2021 11:26), Max: 36.7 (14 Sep 2021 04:55)  T(F): 98 (14 Sep 2021 11:26), Max: 98.1 (14 Sep 2021 04:55)  HR: 79 (14 Sep 2021 11:26) (79 - 80)  BP: 116/79 (14 Sep 2021 11:26) (110/74 - 116/79)  BP(mean): --  RR: 18 (14 Sep 2021 11:26) (18 - 18)  SpO2: 96% (14 Sep 2021 11:26) (95% - 96%)    GENERAL PHYSICAL EXAM:  GEN: no distress   HEENT: NCAT, OP clear  EYES: sclera white, conjunctiva clear, no nystagmus  NECK: supple  CV: RRR, no murmur     		  PULM: CTAB, no wheezing  ABD: soft, +BS, NT  EXT: peripheral pulse intact, no cyanosis  MSK: muscle tone and strength normal  SKIN: warm, dry    NEUROLOGICAL EXAM:  Mental Status  Orientation: alert and oriented to person, place, time, and situation   Language: clear and fluent    Cranial Nerves  II: full visual fields intact   III, IV, VI: PERRL, EOMI  V, VII: facial sensation and movement intact and symmetric   VIII: hearing intact   IX, X: uvula midline, soft palate elevates normally   XI: BL shoulder shrug intact   XII: tongue midline    Motor  5/5 BUE and BLE                 Tone and bulk are normal in upper and lower limbs  No pronator drift    Sensation  Intact to light touch and pinprick in all 4 EXTs    Reflex  2+ in BL biceps and patella                                    Plantar responses downward bilaterally    Coordination  Normal FTN bilaterally    Gait  Deferred       LABS:      09-12    147<H>  |  112<H>  |  9.5  ----------------------------<  99  3.9   |  20.0<L>  |  0.98    Ca    9.0      12 Sep 2021 06:10      CAPILLARY BLOOD GLUCOSE      POCT Blood Glucose.: 224 mg/dL (13 Sep 2021 17:12)  POCT Blood Glucose.: 250 mg/dL (13 Sep 2021 16:10)  POCT Blood Glucose.: 213 mg/dL (13 Sep 2021 11:49)  POCT Blood Glucose.: 90 mg/dL (13 Sep 2021 07:34)        OTHER IMAGING AND STUDIES:    non-elective EMU 9/13 - 9/14/21:  EEG Summary:  Normal EEG in the awake, drowsy and asleep states.    Impression/Clinical Correlate:  9/13 – 9/14: No events or seizures were recorded.    Normal video EEG in awake, drowsy and asleep states.      cvEEG 9/9 - 9/10/21:  EEG SUMMARY/CLASSIFICATION    Abnormal EEG  - Mild to moderate generalized slowing.    _____________________________________________________________  EEG IMPRESSION/CLINICAL CORRELATE    This EEG is consistent with mild to moderate diffuse cerebral dysfunction.  No epileptiform pattern or seizure seen.      cvEEG 9/8 - 9/9/21:  EEG SUMMARY/CLASSIFICATION    Abnormal EEG in a sedated patient.  - Diffuse excessive beta activity.  - Severe generalized slowing.    _____________________________________________________________  EEG IMPRESSION/CLINICAL CORRELATE    This EEG is consistent with severe diffuse cerebral dysfunction.  Diffuse excessive beta activity is likely benzodiazepine side effect.  Sinus tachycardia on single lead ECG  No epileptiform pattern or seizure seen.      < from: MR Brain-Seizure, Epilepsy No Cont (09.13.21 @ 11:42) >  FINDINGS:    The ventricular and sulcal size and configuration is age appropriate. There is a T2/FLAIR hyperintensity involving the left parietotemporal lobe with underlying diffusion restriction. The amount of T2/FLAIR hyperintensity involves both the gray and white matter. There was no abnormality noted in the comparison CT.    There is no intracranial hemorrhage, midline shift or herniation. There is no abnormal extra axial fluid collection or hydrocephalus. There is no abnormal signal in the mesial temporal cortices.    The flow-voids of the major central arterial circulation at the skull base are normal, suggestive of of their patency.    The visualized globes are symmetric bilaterally.    The visualized paranasal sinuses and mastoid bones are adequately developed and aerated.      IMPRESSION:    Abnormal signal in the left parietotemporal lobe most likely represents an acute infarction. There is no hemorrhagic conversion. There is no midline shift or herniation.    Additional differential diagnostic consideration is post seizure focus. Further correlation with MRI of the brain with contrast is suggested to exclude underlying lesion.

## 2021-09-14 NOTE — PROGRESS NOTE ADULT - ASSESSMENT
53 y/o F w/ PMH of prediabetes was BIBA after rell found unresponsive at home. She had agonal breathing and she was brought to ER where she was intubated and found to have severe DKA, now resolved after standard treatment.  A1c > 16%     DKA w/ coma and severe metabolic acidosis :   - check Bgs ac tid and hs   -sugars better now, to back off on dose of  lantus  to 25  units once a day   -continue lispro 5 units tid with meals .  - continue SSI for coverage.  - can discharge on synjardy BID and stop insulin   - Will c/w keppra 500mg bid for now     pseudohyponatremia/ hyperNa : continue iV fluids .monitor CMp     FREDRICK / CKD : resolved.

## 2021-09-14 NOTE — PROGRESS NOTE ADULT - SUBJECTIVE AND OBJECTIVE BOX
CC: Follow up    INTERVAL HPI/OVERNIGHT EVENTS: Patient seen and examined, no acute complaints overnight. Remains on EEG      Vital Signs Last 24 Hrs  T(C): 36.7 (14 Sep 2021 04:55), Max: 36.7 (14 Sep 2021 04:55)  T(F): 98.1 (14 Sep 2021 04:55), Max: 98.1 (14 Sep 2021 04:55)  HR: 80 (14 Sep 2021 04:55) (76 - 80)  BP: 110/74 (14 Sep 2021 04:55) (110/74 - 122/82)  BP(mean): --  RR: 18 (14 Sep 2021 04:55) (18 - 18)  SpO2: 95% (14 Sep 2021 04:55) (95% - 100%)    PHYSICAL EXAM:    GENERAL: NAD, AOX3 obese   HEAD:  Atraumatic, Normocephalic  EYES: conjunctiva and sclera clear  ENMT: Moist mucous membranes  NECK: Supple, No JVD  NERVOUS SYSTEM:  Alert & Oriented X3, Motor Strength 5/5 B/L upper and lower extremities  CHEST/LUNG: Clear to auscultation bilaterally; No rales, rhonchi, wheezing, or rubs  HEART: Regular rate and rhythm; No murmurs, rubs, or gallops  ABDOMEN: Soft, Nontender, Nondistended; Bowel sounds present  EXTREMITIES:  2+ Peripheral Pulses, No clubbing, cyanosis, or edema        MEDICATIONS  (STANDING):  aspirin enteric coated 81 milliGRAM(s) Oral daily  benzocaine 15 mG/menthol 3.6 mG (Sugar-Free) Lozenge 1 Lozenge Oral four times a day  chlorhexidine 2% Cloths 1 Application(s) Topical <User Schedule>  dextrose 40% Gel 15 Gram(s) Oral once  dextrose 50% Injectable 25 Gram(s) IV Push once  dextrose 50% Injectable 12.5 Gram(s) IV Push once  dextrose 50% Injectable 25 Gram(s) IV Push once  glucagon  Injectable 1 milliGRAM(s) IntraMuscular once  heparin   Injectable 5000 Unit(s) SubCutaneous every 8 hours  insulin glargine Injectable (LANTUS) 20 Unit(s) SubCutaneous every morning  insulin lispro (ADMELOG) corrective regimen sliding scale   SubCutaneous Before meals and at bedtime  insulin lispro Injectable (ADMELOG) 5 Unit(s) SubCutaneous three times a day before meals  nystatin Powder 1 Application(s) Topical two times a day    MEDICATIONS  (PRN):  phenol 1.4% (CHLORASEPTIC) Oral Spray 1 Spray(s) Topical every 4 hours PRN sore throat      Allergies    No Known Allergies    Intolerances          LABS:                          12.1   7.30  )-----------( 205      ( 14 Sep 2021 10:01 )             36.6     09-14    140  |  102  |  12.3  ----------------------------<  181<H>  3.9   |  25.0  |  1.07    Ca    9.6      14 Sep 2021 10:01            RADIOLOGY & ADDITIONAL TESTS:

## 2021-09-14 NOTE — EEG REPORT - NS EEG TEXT BOX
[facility]  Epilepsy Monitoring Unit Report    [facility]: [address], [place] [address], Phone [phone]  [facility]: [address] [place], [place] [address], [place] [address], Phone [phone]  [place] [facility]: [address], [address], [place] [address] Phone [phone]    [facility]: [address] [place] [address], Phone [phone]  [place] [facility]: [address] [place] [address], Phone [phone]    Patient Name: Leda Isidro    Age: 56 year, : 1965  Patient ID: -, MRN #: -, [place]: -    Physician Ordering Inpatient EEG: Prudence Brewer   Referral Source to EMU: non-elective admission – from Internal Medicine    EMU Study Started: 20:16 on 21    EMU Study Ended: 10:49 on 21    Study Information:    EEG Recording Technique:  The patient underwent continuous Video-EEG monitoring, using Telemetry System hardware on the XLTek Digital System. EEG and video data were stored on a computer hard drive with important events saved in digital archive files. The material was reviewed by a physician (electroencephalographer / epileptologist) on a daily basis. Kalin and seizure detection algorithms were utilized and reviewed. An EEG Technician attended to the patient, and was available throughout daytime work hours.  The epilepsy center neurologist was available in person or on call 24-hours per day.    EEG Placement and Labeling of Electrodes:  The EEG was performed utilizing 20 channel referential EEG connections (coronal over temporal over parasagittal montage) using all standard 10-20 electrode placements with EKG, with additional electrodes placed in the inferior temporal region using the modified 10-10 montage electrode placements for elective admissions, or if deemed necessary. Recording was at a sampling rate of 256 samples per second per channel. Time synchronized digital video recording was done simultaneously with EEG recording. A low light infrared camera was used for low light recording.         History:  57yo female with DM who presented with DKA and new onset BTC seizures. MRI brain with T2/FLAIR hyperintensity involving the left parietotemporal lobe with underlying diffusion restriction.    Home Antiepileptic Medication and Device  none    Interpretation:    Start Date: 2021 – Day 1                                Start Time – 20:16       Duration – 12hr 54m    Daily EEG Visual Analysis  Findings: The background was continuous and reactive. During wakefulness, the posterior dominant rhythm consisted of symmetric, well-modulated xx Hz activity, with amplitude to 30 uV, that attenuated to eye opening.     Background Slowing:  No generalized background slowing was present.    Focal Slowing:   None were present.    Sleep Background:  Drowsiness was characterized by fragmentation, attenuation, and slowing of the background activity.    Sleep was characterized by the presence of vertex waves, symmetric sleep spindles and K-complexes.    Other Non-Epileptiform Findings:  None were present.    Interictal Epileptiform Activity:   None were present.    Events:  No events or seizures recorded.    Artifacts:  Intermittent myogenic and movement artifacts were noted.    ECG:  The heart rate on single channel ECG was predominantly between 80-90 BPM.    AED:  none    EEG Summary:  Normal EEG in the awake, drowsy and asleep states.    Impression/Clinical Correlate:   – : No events or seizures were recorded.    Normal video EEG in awake, drowsy and asleep states.    ________________________________________    Prudence Brewer MD  Director, Epilepsy/EMU - [facility]

## 2021-09-14 NOTE — PROGRESS NOTE ADULT - SUBJECTIVE AND OBJECTIVE BOX
INTERVAL HPI/OVERNIGHT EVENTS:  management of diabetes.    MEDICATIONS  (STANDING):  aspirin enteric coated 81 milliGRAM(s) Oral daily  benzocaine 15 mG/menthol 3.6 mG (Sugar-Free) Lozenge 1 Lozenge Oral four times a day  chlorhexidine 2% Cloths 1 Application(s) Topical <User Schedule>  coronavirus (EUA) Vaccine (HealthQx) 0.5 milliLiter(s) IntraMuscular once  dextrose 40% Gel 15 Gram(s) Oral once  dextrose 50% Injectable 25 Gram(s) IV Push once  dextrose 50% Injectable 12.5 Gram(s) IV Push once  dextrose 50% Injectable 25 Gram(s) IV Push once  glucagon  Injectable 1 milliGRAM(s) IntraMuscular once  heparin   Injectable 5000 Unit(s) SubCutaneous every 8 hours  insulin glargine Injectable (LANTUS) 20 Unit(s) SubCutaneous every morning  insulin glargine Injectable (LANTUS) 10 Unit(s) SubCutaneous at bedtime  insulin lispro (ADMELOG) corrective regimen sliding scale   SubCutaneous Before meals and at bedtime  insulin lispro Injectable (ADMELOG) 5 Unit(s) SubCutaneous three times a day before meals  nystatin Powder 1 Application(s) Topical two times a day    MEDICATIONS  (PRN):  phenol 1.4% (CHLORASEPTIC) Oral Spray 1 Spray(s) Topical every 4 hours PRN sore throat      Allergies    No Known Allergies      Review of systems:  no cp, no sob, no n/v    Vital Signs Last 24 Hrs  T(C): 37.1 (13 Sep 2021 04:47), Max: 37.1 (13 Sep 2021 04:47)  T(F): 98.7 (13 Sep 2021 04:47), Max: 98.7 (13 Sep 2021 04:47)  HR: 71 (13 Sep 2021 04:47) (71 - 76)  BP: 114/79 (13 Sep 2021 04:47) (114/79 - 114/83)  BP(mean): --  RR: 18 (13 Sep 2021 04:47) (18 - 18)  SpO2: 97% (13 Sep 2021 04:47) (97% - 97%)    PHYSICAL EXAM:  Constitutional: NAD, well-groomed, well-developed  Respiratory: CTAB  Cardiovascular: S1 and S2, RRR, no M/G/R  Gastrointestinal: BS+, soft, no organomegaly or mass  Extremities: No peripheral edema, no pedal lesions  Psychiatric: Normal mood, normal affect  Skin: No rashes, no acanthosis        LABS:    09-12    147<H>  |  112<H>  |  9.5  ----------------------------<  99  3.9   |  20.0<L>  |  0.98    Ca    9.0      12 Sep 2021 06:10

## 2021-09-14 NOTE — PROGRESS NOTE ADULT - ASSESSMENT
55yo female with history of DM presented with severe DKA (BS in >500s) and new onset seizure. Personally reviewed all imagines, labs, EEG and other studies.    Impression:  1. New onset seizure: Likely hyperglycemia-induced. No prior history of seizure. Continuous video EEG off antiepileptic medication was normal.     2. Abnormal MRI: T2/FLAIR hyperintensity and diffusion restriction in the left parietotemporal lobe - acute infarct VS changes associated with hygerglycemia/sz?  Article on MRI findings seen in hyperglycemia-induced seizures: https://pubmed.ncbi.nlm.nih.gov/17109908/    3. Severe DKA: Resolved.      Recommendation:  - discontinue cvEEG  - no indication for antiepileptic medication at this time  - MRI brain w/, MRA H/N w/ pending  - Dr. Wilson following to complete stroke workup  - endo to manage DM  - seizure precaution  - no driving for now       Will continue to follow with you.   ______________________  Prudence Brewer MD   Director, Epilepsy/EMU - [facility]   Epilepsy Consult #: [phone] ([phone])

## 2021-09-15 ENCOUNTER — NON-APPOINTMENT (OUTPATIENT)
Age: 56
End: 2021-09-15

## 2021-09-15 LAB
GLUCOSE BLDC GLUCOMTR-MCNC: 155 MG/DL — HIGH (ref 70–99)
GLUCOSE BLDC GLUCOMTR-MCNC: 155 MG/DL — HIGH (ref 70–99)
GLUCOSE BLDC GLUCOMTR-MCNC: 161 MG/DL — HIGH (ref 70–99)
GLUCOSE BLDC GLUCOMTR-MCNC: 175 MG/DL — HIGH (ref 70–99)
GLUCOSE BLDC GLUCOMTR-MCNC: 234 MG/DL — HIGH (ref 70–99)

## 2021-09-15 PROCEDURE — 99221 1ST HOSP IP/OBS SF/LOW 40: CPT

## 2021-09-15 PROCEDURE — 70548 MR ANGIOGRAPHY NECK W/DYE: CPT | Mod: 26

## 2021-09-15 PROCEDURE — 99232 SBSQ HOSP IP/OBS MODERATE 35: CPT

## 2021-09-15 PROCEDURE — 99233 SBSQ HOSP IP/OBS HIGH 50: CPT

## 2021-09-15 PROCEDURE — 70544 MR ANGIOGRAPHY HEAD W/O DYE: CPT | Mod: 26,59

## 2021-09-15 PROCEDURE — 70552 MRI BRAIN STEM W/DYE: CPT | Mod: 26

## 2021-09-15 RX ORDER — INSULIN LISPRO 100/ML
5 VIAL (ML) SUBCUTANEOUS
Qty: 15 | Refills: 0
Start: 2021-09-15 | End: 2021-10-14

## 2021-09-15 RX ORDER — ENOXAPARIN SODIUM 100 MG/ML
25 INJECTION SUBCUTANEOUS
Qty: 15 | Refills: 0
Start: 2021-09-15 | End: 2021-10-14

## 2021-09-15 RX ORDER — LANOLIN ALCOHOL/MO/W.PET/CERES
3 CREAM (GRAM) TOPICAL AT BEDTIME
Refills: 0 | Status: DISCONTINUED | OUTPATIENT
Start: 2021-09-15 | End: 2021-09-27

## 2021-09-15 RX ORDER — ISOPROPYL ALCOHOL, BENZOCAINE .7; .06 ML/ML; ML/ML
1 SWAB TOPICAL
Qty: 100 | Refills: 1
Start: 2021-09-15 | End: 2021-11-03

## 2021-09-15 RX ADMIN — NYSTATIN CREAM 1 APPLICATION(S): 100000 CREAM TOPICAL at 06:38

## 2021-09-15 RX ADMIN — HEPARIN SODIUM 5000 UNIT(S): 5000 INJECTION INTRAVENOUS; SUBCUTANEOUS at 18:09

## 2021-09-15 RX ADMIN — BENZOCAINE AND MENTHOL 1 LOZENGE: 5; 1 LIQUID ORAL at 18:11

## 2021-09-15 RX ADMIN — BENZOCAINE AND MENTHOL 1 LOZENGE: 5; 1 LIQUID ORAL at 21:51

## 2021-09-15 RX ADMIN — Medication 2: at 06:37

## 2021-09-15 RX ADMIN — Medication 2: at 11:57

## 2021-09-15 RX ADMIN — Medication 5 UNIT(S): at 06:37

## 2021-09-15 RX ADMIN — BENZOCAINE AND MENTHOL 1 LOZENGE: 5; 1 LIQUID ORAL at 06:34

## 2021-09-15 RX ADMIN — Medication 4: at 21:49

## 2021-09-15 RX ADMIN — Medication 5 UNIT(S): at 18:10

## 2021-09-15 RX ADMIN — NYSTATIN CREAM 1 APPLICATION(S): 100000 CREAM TOPICAL at 18:22

## 2021-09-15 RX ADMIN — Medication 2: at 18:09

## 2021-09-15 RX ADMIN — Medication 5 UNIT(S): at 11:57

## 2021-09-15 RX ADMIN — HEPARIN SODIUM 5000 UNIT(S): 5000 INJECTION INTRAVENOUS; SUBCUTANEOUS at 21:51

## 2021-09-15 RX ADMIN — Medication 81 MILLIGRAM(S): at 11:57

## 2021-09-15 RX ADMIN — HEPARIN SODIUM 5000 UNIT(S): 5000 INJECTION INTRAVENOUS; SUBCUTANEOUS at 06:33

## 2021-09-15 RX ADMIN — Medication 3 MILLIGRAM(S): at 21:51

## 2021-09-15 RX ADMIN — INSULIN GLARGINE 25 UNIT(S): 100 INJECTION, SOLUTION SUBCUTANEOUS at 08:30

## 2021-09-15 NOTE — PROGRESS NOTE ADULT - SUBJECTIVE AND OBJECTIVE BOX
[facility]                                        [facility] [place]                                 Juan Lee [facility] Anupam                                  [address]                                        [place], [place], [address]                                             [phone]        CC: seizure     HPI:   55 yo woman with DKA and witnessed generalized tonic clonic seizures night of admission, unclear if patient has epilepsy history, however, she was admitted in 8/2020 without a documented history of seizures at that time (MATTHEW)    Interim history:  No seizures since admission.     ROS:   Denies headache or dizziness.  Denies chest pain.  Denies shortness of breath.    MEDICATIONS  (STANDING):  aspirin enteric coated 81 milliGRAM(s) Oral daily  benzocaine 15 mG/menthol 3.6 mG (Sugar-Free) Lozenge 1 Lozenge Oral four times a day  dextrose 40% Gel 15 Gram(s) Oral once  dextrose 50% Injectable 25 Gram(s) IV Push once  dextrose 50% Injectable 12.5 Gram(s) IV Push once  dextrose 50% Injectable 25 Gram(s) IV Push once  glucagon  Injectable 1 milliGRAM(s) IntraMuscular once  heparin   Injectable 5000 Unit(s) SubCutaneous every 8 hours  insulin glargine Injectable (LANTUS) 25 Unit(s) SubCutaneous every morning  insulin lispro (ADMELOG) corrective regimen sliding scale   SubCutaneous Before meals and at bedtime  insulin lispro Injectable (ADMELOG) 5 Unit(s) SubCutaneous three times a day before meals  nystatin Powder 1 Application(s) Topical two times a day    MEDICATIONS  (PRN):  phenol 1.4% (CHLORASEPTIC) Oral Spray 1 Spray(s) Topical every 4 hours PRN sore throat      Vital Signs Last 24 Hrs  T(C): 37.2 (15 Sep 2021 14:00), Max: 37.2 (15 Sep 2021 14:00)  T(F): 98.9 (15 Sep 2021 14:00), Max: 98.9 (15 Sep 2021 14:00)  HR: 85 (15 Sep 2021 14:00) (71 - 85)  BP: 107/65 (15 Sep 2021 14:00) (107/65 - 116/82)  BP(mean): --  RR: 18 (15 Sep 2021 14:00) (18 - 20)  SpO2: 96% (15 Sep 2021 14:00) (95% - 97%)    Detailed Neurologic Exam:    Mental status: The patient is awake and alert. There is no aphasia. There is no dysarthria.     Cranial nerves: Pupils equal and react symmetrically to light. There is no visual field deficit to threat. Extraocular motion is full with no nystagmus. Facial sensation is intact. Facial musculature is symmetric. Palate elevates symmetrically. Tongue is midline.    Motor: There is normal bulk and tone.  There is no tremor.  Strength grossly 5/5 bilaterally.    Sensation: Grossly intact to light touch and pin.    Reflexes: 2+ throughout and plantar responses are flexor.    Cerebellar: No dysmetria on finger nose testing.    Labs:                         12.1   7.30  )-----------( 205      ( 14 Sep 2021 10:01 )             36.6     09-14    140  |  102  |  12.3  ----------------------------<  181<H>  3.9   |  25.0  |  1.07    Ca    9.6      14 Sep 2021 10:01        Rad:   MRI brain with contrast- left occipital lesion with rim enhancement, mild restricted diffusion, no blood  MRA head- no aneurysm, AVM, LVO or significant stenosis in COW  MRA neck- no significant stenosis or occlusion    MRI brain images reviewed (and concur with report): There is an area of abnormal signal in the left parieto-temporal area. The differential diagnosis includes stroke and post-seizure focus.

## 2021-09-15 NOTE — PROGRESS NOTE ADULT - ASSESSMENT
57yo female with history of DM presented with severe DKA (BS in >500s) and new onset seizure. Personally reviewed all imagines, labs, EEG and other studies.    Impression:  1. New onset seizure: Likely hyperglycemia-induced. No prior history of seizure. Continuous video EEG off antiepileptic medication was normal.     2. Abnormal MRI: T2/FLAIR hyperintensity and diffusion restriction in the left parietotemporal lobe - acute infarct VS changes associated with hygerglycemia/sz?  Article on MRI findings seen in hyperglycemia-induced seizures: https://pubmed.ncbi.nlm.nih.gov/65596656/    3. Severe DKA: Resolved.      Recommendation:  - no indication for antiepileptic medication at this time  - MRI brain w/, MRA H/N w/ pending  - Dr. Wilson/Juan following to complete stroke workup  - endo to manage DM  - seizure precaution  - no driving for now       No acute intervention from Epilepsy at this time.  Will follow peripherally.   ______________________  Prudence Brewer MD   Director, Epilepsy/EMU - [facility]   Epilepsy Consult #: [phone] ([phone])

## 2021-09-15 NOTE — PROGRESS NOTE ADULT - SUBJECTIVE AND OBJECTIVE BOX
INTERVAL HPI/OVERNIGHT EVENTS:  management of diabetes.    MEDICATIONS  (STANDING):  aspirin enteric coated 81 milliGRAM(s) Oral daily  benzocaine 15 mG/menthol 3.6 mG (Sugar-Free) Lozenge 1 Lozenge Oral four times a day  chlorhexidine 2% Cloths 1 Application(s) Topical <User Schedule>  coronavirus (EUA) Vaccine (Topadmit) 0.5 milliLiter(s) IntraMuscular once  dextrose 40% Gel 15 Gram(s) Oral once  dextrose 50% Injectable 25 Gram(s) IV Push once  dextrose 50% Injectable 12.5 Gram(s) IV Push once  dextrose 50% Injectable 25 Gram(s) IV Push once  glucagon  Injectable 1 milliGRAM(s) IntraMuscular once  heparin   Injectable 5000 Unit(s) SubCutaneous every 8 hours  insulin glargine Injectable (LANTUS) 20 Unit(s) SubCutaneous every morning  insulin glargine Injectable (LANTUS) 10 Unit(s) SubCutaneous at bedtime  insulin lispro (ADMELOG) corrective regimen sliding scale   SubCutaneous Before meals and at bedtime  insulin lispro Injectable (ADMELOG) 5 Unit(s) SubCutaneous three times a day before meals  nystatin Powder 1 Application(s) Topical two times a day    MEDICATIONS  (PRN):  phenol 1.4% (CHLORASEPTIC) Oral Spray 1 Spray(s) Topical every 4 hours PRN sore throat      Allergies    No Known Allergies      Review of systems:  no cp, no sob, no n/v    Vital Signs Last 24 Hrs  T(C): 37.1 (13 Sep 2021 04:47), Max: 37.1 (13 Sep 2021 04:47)  T(F): 98.7 (13 Sep 2021 04:47), Max: 98.7 (13 Sep 2021 04:47)  HR: 71 (13 Sep 2021 04:47) (71 - 76)  BP: 114/79 (13 Sep 2021 04:47) (114/79 - 114/83)  BP(mean): --  RR: 18 (13 Sep 2021 04:47) (18 - 18)  SpO2: 97% (13 Sep 2021 04:47) (97% - 97%)    PHYSICAL EXAM:  Constitutional: NAD, well-groomed, well-developed  Respiratory: CTAB  Cardiovascular: S1 and S2, RRR, no M/G/R  Gastrointestinal: BS+, soft, no organomegaly or mass  Extremities: No peripheral edema, no pedal lesions  Psychiatric: Normal mood, normal affect  Skin: No rashes, no acanthosis        LABS:    09-12    147<H>  |  112<H>  |  9.5  ----------------------------<  99  3.9   |  20.0<L>  |  0.98    Ca    9.0      12 Sep 2021 06:10

## 2021-09-15 NOTE — PROGRESS NOTE ADULT - SUBJECTIVE AND OBJECTIVE BOX
CC: Follow up     INTERVAL HPI/OVERNIGHT EVENTS: Patient seen and examined, complaints of throat irritation. Denies cough sob or palpitations.       Vital Signs Last 24 Hrs  T(C): 36.7 (15 Sep 2021 10:57), Max: 36.7 (15 Sep 2021 10:57)  T(F): 98.1 (15 Sep 2021 10:57), Max: 98.1 (15 Sep 2021 10:57)  HR: 80 (15 Sep 2021 10:57) (71 - 80)  BP: 116/82 (15 Sep 2021 10:57) (109/75 - 116/82)  BP(mean): --  RR: 18 (15 Sep 2021 10:57) (18 - 20)  SpO2: 95% (15 Sep 2021 10:57) (95% - 97%)    PHYSICAL EXAM:    GENERAL: NAD, AOX3 obese  HEAD:  Atraumatic, Normocephalic  EYES: conjunctiva and sclera clear  ENMT: Moist mucous membranes  NECK: Supple, No JVD  CHEST/LUNG: Clear to auscultation bilaterally; No rales, rhonchi, wheezing, or rubs  HEART: Regular rate and rhythm; No murmurs, rubs, or gallops  ABDOMEN: Soft, Nontender, Nondistended; Bowel sounds present  EXTREMITIES:  2+ Peripheral Pulses, No clubbing, cyanosis, or edema        MEDICATIONS  (STANDING):  aspirin enteric coated 81 milliGRAM(s) Oral daily  benzocaine 15 mG/menthol 3.6 mG (Sugar-Free) Lozenge 1 Lozenge Oral four times a day  dextrose 40% Gel 15 Gram(s) Oral once  dextrose 50% Injectable 25 Gram(s) IV Push once  dextrose 50% Injectable 12.5 Gram(s) IV Push once  dextrose 50% Injectable 25 Gram(s) IV Push once  glucagon  Injectable 1 milliGRAM(s) IntraMuscular once  heparin   Injectable 5000 Unit(s) SubCutaneous every 8 hours  insulin glargine Injectable (LANTUS) 25 Unit(s) SubCutaneous every morning  insulin lispro (ADMELOG) corrective regimen sliding scale   SubCutaneous Before meals and at bedtime  insulin lispro Injectable (ADMELOG) 5 Unit(s) SubCutaneous three times a day before meals  nystatin Powder 1 Application(s) Topical two times a day    MEDICATIONS  (PRN):  phenol 1.4% (CHLORASEPTIC) Oral Spray 1 Spray(s) Topical every 4 hours PRN sore throat      Allergies    No Known Allergies    Intolerances          LABS:                          12.1   7.30  )-----------( 205      ( 14 Sep 2021 10:01 )             36.6     09-14    140  |  102  |  12.3  ----------------------------<  181<H>  3.9   |  25.0  |  1.07    Ca    9.6      14 Sep 2021 10:01            RADIOLOGY & ADDITIONAL TESTS:

## 2021-09-15 NOTE — CONSULT NOTE ADULT - SUBJECTIVE AND OBJECTIVE BOX
HPI:  Pt is a 55 y/o female with reportedly no PMHx questionable "DKA attack" in the past per family was misdiagnosed presents to [facility] ED after being found unresponsive in pool of urine @ home yesterday. Pt reported last known normal in yesterday AM. On arrival by EMS, pt unresponsive, agonal, attempted intubation but was unsuccessful. BS in field >400. Brought to ED subsequently intubated. Labs revealing severe DKA w/ pH 6.9, serum co2 2, undetectable BS on f/s, 676 on BMP. Utox neg. CTH/chest unremarkable. ICU consulted.     Seen and examined in ED. Tachycardic, normotensive, saturating 100% on 50% fio2. On 60mcg propofol, 11u insulin.  (08 Sep 2021 01:22)    PAST MEDICAL & SURGICAL HISTORY:  No pertinent past medical history        REVIEW OF SYSTEMS:    CONSTITUTIONAL: No fever, weight loss, or fatigue  EYES: No eye pain, visual disturbances, or discharge  ENMT:  No difficulty hearing, tinnitus, vertigo; No sinus or throat pain  NECK: No pain or stiffness  BREASTS: No pain, masses, or nipple discharge  RESPIRATORY: No cough, wheezing, chills or hemoptysis; No shortness of breath  CARDIOVASCULAR: No chest pain, palpitations, dizziness, or leg swelling  GASTROINTESTINAL: No abdominal or epigastric pain. No nausea, vomiting, or hematemesis; No diarrhea or constipation. No melena or hematochezia.  GENITOURINARY: No dysuria, frequency, hematuria, or incontinence  NEUROLOGICAL: No headaches, memory loss, loss of strength, numbness, or tremors  SKIN: No itching, burning, rashes, or lesions   LYMPH NODES: No enlarged glands  ENDOCRINE: No heat or cold intolerance; No hair loss  MUSCULOSKELETAL: No joint pain or swelling; No muscle, back, or extremity pain  PSYCHIATRIC: No depression, anxiety, mood swings, or difficulty sleeping  HEME/LYMPH: No easy bruising, or bleeding gums  ALLERY AND IMMUNOLOGIC: No hives or eczema    Allergies  No Known Allergies  Intolerances      MEDICATIONS  (STANDING):  aspirin enteric coated 81 milliGRAM(s) Oral daily  benzocaine 15 mG/menthol 3.6 mG (Sugar-Free) Lozenge 1 Lozenge Oral four times a day  dextrose 40% Gel 15 Gram(s) Oral once  dextrose 50% Injectable 25 Gram(s) IV Push once  dextrose 50% Injectable 12.5 Gram(s) IV Push once  dextrose 50% Injectable 25 Gram(s) IV Push once  glucagon  Injectable 1 milliGRAM(s) IntraMuscular once  heparin   Injectable 5000 Unit(s) SubCutaneous every 8 hours  insulin glargine Injectable (LANTUS) 25 Unit(s) SubCutaneous every morning  insulin lispro (ADMELOG) corrective regimen sliding scale   SubCutaneous Before meals and at bedtime  insulin lispro Injectable (ADMELOG) 5 Unit(s) SubCutaneous three times a day before meals  nystatin Powder 1 Application(s) Topical two times a day    MEDICATIONS  (PRN):  phenol 1.4% (CHLORASEPTIC) Oral Spray 1 Spray(s) Topical every 4 hours PRN sore throat    SOCIAL HISTORY:  FAMILY HISTORY:    Vital Signs Last 24 Hrs  T(C): 37.2 (15 Sep 2021 14:00), Max: 37.2 (15 Sep 2021 14:00)  T(F): 98.9 (15 Sep 2021 14:00), Max: 98.9 (15 Sep 2021 14:00)  HR: 85 (15 Sep 2021 14:00) (71 - 85)  BP: 107/65 (15 Sep 2021 14:00) (107/65 - 116/82)  BP(mean): --  RR: 18 (15 Sep 2021 14:00) (18 - 20)  SpO2: 96% (15 Sep 2021 14:00) (95% - 97%)    PHYSICAL EXAM:    GENERAL: NAD, well-groomed, well-developed  HEAD:  Atraumatic, Normocephalic  EYES: EOMI, PERRLA, conjunctiva and sclera clear  ENMT: No tonsillar erythema, exudates, or enlargement; Moist mucous membranes, Good dentition, No lesions  NECK: Supple, No JVD, Normal thyroid  NERVOUS SYSTEM:  Alert & Oriented X3, Good concentration; Motor Strength 5/5 B/L upper and lower extremities; DTRs 2+ intact and symmetric  CHEST/LUNG: Clear to percussion bilaterally; No rales, rhonchi, wheezing, or rubs  HEART: Regular rate and rhythm; No murmurs, rubs, or gallops  ABDOMEN: Soft, Nontender, Nondistended; Bowel sounds present  EXTREMITIES:  2+ Peripheral Pulses, No clubbing, cyanosis, or edema  LYMPH: No lymphadenopathy noted  SKIN: No rashes or lesions    LABS:                        12.1   7.30  )-----------( 205      ( 14 Sep 2021 10:01 )             36.6     09-14    140  |  102  |  12.3  ----------------------------<  181<H>  3.9   |  25.0  |  1.07    Ca    9.6      14 Sep 2021 10:01            RADIOLOGY & ADDITIONAL STUDIES:  ~~~~~~~~~~~~~~~~~~~~~~~~~~~~~~  < from: CT Head No Cont (09.07.21 @ 23:36) >   EXAM:  CT CERVICAL SPINE                         EXAM:  CT BRAIN                        PROCEDURE DATE:  09/07/2021    IMPRESSION:  CT head:  No acute intracranial hemorrhage or mass effect.    CT cervical spine:  No CT evidence of cervical spine fracture.  --- End of Report ---  < end of copied text >  < from: MR Head w/ IV Cont (09.15.21 @ 15:08) >     EXAM:  MR ANGIO NECK IC                         EXAM:  MR ANGIO BRAIN                         EXAM:  MR BRAIN IC                        PROCEDURE DATE:  09/15/2021    IMPRESSION:  Corresponding to the abnormal signal seen in the previous MRI,  There is a peripherally enhancing mass with satellite smaller lesions as described, most likely represents primary versus metastatic disease however, the former is more likely secondary to absence of large vasogenic edema. Tissue sampling is suggested.  There is no substantial intracranial arterial stenosis or large vessel occlusion.  There is no significant arterial stenosis in the neck or dissection.  --- End of Report ---  < end of copied text >    < from: CT Chest No Cont (09.07.21 @ 23:43) >   EXAM:  CT CHEST                        PROCEDURE DATE:  09/07/2021    FINDINGS  LUNGS AND AIRWAYS: ET tube tip in the right mainstem bronchus; slight retraction is recommended. Patent central airways.  Streaky left basilar atelectasis.  PLEURA: No pleural effusion.  MEDIASTINUM AND CEE: No lymphadenopathy.  VESSELS: Normal caliber thoracic aorta.  HEART: Heart size is normal. No pericardial effusion.  CHEST WALL AND LOWER NECK: Within normal limits.  VISUALIZED UPPER ABDOMEN: Grossly unremarkable.  BONES: Degenerative changes in the spine.  IMPRESSION:  ET tube tip in the right mainstem bronchus; slight retraction is recommended.  < end of copied text >   HPI:  Pt is a 55 y/o female with reportedly no PMHx questionable "DKA attack" in the past per family was misdiagnosed presents to [facility] ED after being found unresponsive in pool of urine @ home yesterday. Pt reported last known normal in yesterday AM. On arrival by EMS, pt unresponsive, agonal, attempted intubation but was unsuccessful. BS in field >400. Brought to ED subsequently intubated. Labs revealing severe DKA w/ pH 6.9, serum co2 2, undetectable BS on f/s, 676 on BMP. Utox neg. CTH/chest unremarkable. ICU consulted.     Seen and examined in ED. Tachycardic, normotensive, saturating 100% on 50% fio2. On 60mcg propofol, 11u insulin.  (08 Sep 2021 01:22)  Pt is right Handed. Pt states that she has been in good health. Last year she was told that she may have diabetes and she was placed on metformin for a short course. Pt states that her episode of unconsciousness is a new event that has never had happen. Pt denies having any seizure activity, weakness or visual changes.  Pt does have a hx of cluster headaches.     PAST MEDICAL & SURGICAL HISTORY:  No pertinent past medical history        REVIEW OF SYSTEMS:    CONSTITUTIONAL: No fever, positive weight loss some intentional, or fatigue  EYES: No eye pain, visual disturbances, or discharge  ENMT:  No difficulty hearing, tinnitus, vertigo; No sinus or throat pain  NECK: No pain or stiffness  BREASTS: No pain, masses, or nipple discharge  RESPIRATORY: No cough, wheezing, chills or hemoptysis; No shortness of breath  CARDIOVASCULAR: No chest pain, palpitations, dizziness, or leg swelling  GASTROINTESTINAL: No abdominal or epigastric pain. No nausea, vomiting, or hematemesis; No diarrhea or constipation. No melena or hematochezia.  GENITOURINARY: No dysuria, frequency, hematuria, or incontinence  NEUROLOGICAL: hx headaches, memory loss, loss of strength, numbness, or tremors  SKIN: No itching, burning, rashes, or lesions   LYMPH NODES: No enlarged glands  ENDOCRINE: No heat or cold intolerance; No hair loss  MUSCULOSKELETAL: No joint pain or swelling; No muscle, back, or extremity pain  PSYCHIATRIC: No depression, anxiety, mood swings, or difficulty sleeping  HEME/LYMPH: No easy bruising, or bleeding gums  ALLERY AND IMMUNOLOGIC: No hives or eczema    Allergies  No Known Allergies  Intolerances      MEDICATIONS  (STANDING):  aspirin enteric coated 81 milliGRAM(s) Oral daily  benzocaine 15 mG/menthol 3.6 mG (Sugar-Free) Lozenge 1 Lozenge Oral four times a day  dextrose 40% Gel 15 Gram(s) Oral once  dextrose 50% Injectable 25 Gram(s) IV Push once  dextrose 50% Injectable 12.5 Gram(s) IV Push once  dextrose 50% Injectable 25 Gram(s) IV Push once  glucagon  Injectable 1 milliGRAM(s) IntraMuscular once  heparin   Injectable 5000 Unit(s) SubCutaneous every 8 hours  insulin glargine Injectable (LANTUS) 25 Unit(s) SubCutaneous every morning  insulin lispro (ADMELOG) corrective regimen sliding scale   SubCutaneous Before meals and at bedtime  insulin lispro Injectable (ADMELOG) 5 Unit(s) SubCutaneous three times a day before meals  nystatin Powder 1 Application(s) Topical two times a day    MEDICATIONS  (PRN):  phenol 1.4% (CHLORASEPTIC) Oral Spray 1 Spray(s) Topical every 4 hours PRN sore throat    SOCIAL HISTORY:  FAMILY HISTORY:    Vital Signs Last 24 Hrs  T(C): 37.2 (15 Sep 2021 14:00), Max: 37.2 (15 Sep 2021 14:00)  T(F): 98.9 (15 Sep 2021 14:00), Max: 98.9 (15 Sep 2021 14:00)  HR: 85 (15 Sep 2021 14:00) (71 - 85)  BP: 107/65 (15 Sep 2021 14:00) (107/65 - 116/82)  BP(mean): --  RR: 18 (15 Sep 2021 14:00) (18 - 20)  SpO2: 96% (15 Sep 2021 14:00) (95% - 97%)    PHYSICAL EXAM:  GCS 15  GENERAL: NAD, well-groomed, well-developed  HEAD:  Atraumatic, Normocephalic,   EYES: EOMI, PERRLA, conjunctiva and sclera clear, vision grossly intact  ENMT: No tonsillar erythema, exudates, or enlargement; Moist mucous membranes, Good dentition, No lesions, neg facial   NECK: Supple,  NERVOUS SYSTEM:  Alert & Oriented X3, Good concentration; Motor Strength 5/5 B/L upper and lower extremities; DTRs 2+ intact and symmetric, neg pronators. neg dysmetria.   CHEST/LUNG: Clear BS bilaterally; No rales, rhonchi, wheezing, or rubs  HEART: Regular rate and rhythm; No murmurs, rubs, or gallops  ABDOMEN: Soft, Nontender, Nondistended; Bowel sounds present  EXTREMITIES: large lower extrem full range of motion. no weakness appreciate.   LYMPH: No lymphadenopathy noted  SKIN: No rashes or lesions    LABS:                        12.1   7.30  )-----------( 205      ( 14 Sep 2021 10:01 )             36.6     09-14    140  |  102  |  12.3  ----------------------------<  181<H>  3.9   |  25.0  |  1.07    Ca    9.6      14 Sep 2021 10:01            RADIOLOGY & ADDITIONAL STUDIES:  ~~~~~~~~~~~~~~~~~~~~~~~~~~~~~~  < from: CT Head No Cont (09.07.21 @ 23:36) >   EXAM:  CT CERVICAL SPINE                         EXAM:  CT BRAIN                        PROCEDURE DATE:  09/07/2021    IMPRESSION:  CT head:  No acute intracranial hemorrhage or mass effect.    CT cervical spine:  No CT evidence of cervical spine fracture.  --- End of Report ---  < end of copied text >  < from: MR Head w/ IV Cont (09.15.21 @ 15:08) >     EXAM:  MR ANGIO NECK IC                         EXAM:  MR ANGIO BRAIN                         EXAM:  MR BRAIN IC                        PROCEDURE DATE:  09/15/2021    IMPRESSION:  Corresponding to the abnormal signal seen in the previous MRI,  There is a peripherally enhancing mass with satellite smaller lesions as described, most likely represents primary versus metastatic disease however, the former is more likely secondary to absence of large vasogenic edema. Tissue sampling is suggested.  There is no substantial intracranial arterial stenosis or large vessel occlusion.  There is no significant arterial stenosis in the neck or dissection.  --- End of Report ---  < end of copied text >    < from: CT Chest No Cont (09.07.21 @ 23:43) >   EXAM:  CT CHEST                        PROCEDURE DATE:  09/07/2021    FINDINGS  LUNGS AND AIRWAYS: ET tube tip in the right mainstem bronchus; slight retraction is recommended. Patent central airways.  Streaky left basilar atelectasis.  PLEURA: No pleural effusion.  MEDIASTINUM AND CEE: No lymphadenopathy.  VESSELS: Normal caliber thoracic aorta.  HEART: Heart size is normal. No pericardial effusion.  CHEST WALL AND LOWER NECK: Within normal limits.  VISUALIZED UPPER ABDOMEN: Grossly unremarkable.  BONES: Degenerative changes in the spine.  IMPRESSION:  ET tube tip in the right mainstem bronchus; slight retraction is recommended.  < end of copied text >   HPI:  Pt is a 55 y/o female with reportedly no PMHx questionable "DKA attack" in the past per family was misdiagnosed presents to [facility] ED after being found unresponsive in pool of urine @ home yesterday. Pt reported last known normal in yesterday AM. On arrival by EMS, pt unresponsive, agonal, attempted intubation but was unsuccessful. BS in field >400. Brought to ED subsequently intubated. Labs revealing severe DKA w/ pH 6.9, serum co2 2, undetectable BS on f/s, 676 on BMP. Utox neg. CTH/chest unremarkable. ICU consulted.     Seen and examined in ED. Tachycardic, normotensive, saturating 100% on 50% fio2. On 60mcg propofol, 11u insulin.  (08 Sep 2021 01:22)  Pt is right Handed. Pt states that she has been in good health. Last year she was told that she may have diabetes and she was placed on metformin for a short course. Pt states that her episode of unconsciousness is a new event that has never had happen. Pt denies having any seizure activity, weakness or visual changes.  Pt does have a hx of cluster headaches.     PAST MEDICAL & SURGICAL HISTORY:  No pertinent past medical history        REVIEW OF SYSTEMS:    CONSTITUTIONAL: No fever, positive weight loss some intentional, or fatigue  EYES: No eye pain, visual disturbances, or discharge  ENMT:  No difficulty hearing, tinnitus, vertigo; No sinus or throat pain  NECK: No pain or stiffness  BREASTS: No pain, masses, or nipple discharge  RESPIRATORY: No cough, wheezing, chills or hemoptysis; No shortness of breath  CARDIOVASCULAR: No chest pain, palpitations, dizziness, or leg swelling  GASTROINTESTINAL: No abdominal or epigastric pain. No nausea, vomiting, or hematemesis; No diarrhea or constipation. No melena or hematochezia.  GENITOURINARY: No dysuria, frequency, hematuria, or incontinence  NEUROLOGICAL: hx headaches, memory loss, loss of strength, numbness, or tremors  SKIN: No itching, burning, rashes, or lesions   LYMPH NODES: No enlarged glands  ENDOCRINE: No heat or cold intolerance; No hair loss  MUSCULOSKELETAL: No joint pain or swelling; No muscle, back, or extremity pain  PSYCHIATRIC: No depression, anxiety, mood swings, or difficulty sleeping  HEME/LYMPH: No easy bruising, or bleeding gums  ALLERY AND IMMUNOLOGIC: No hives or eczema    Allergies  No Known Allergies  Intolerances      MEDICATIONS  (STANDING):  aspirin enteric coated 81 milliGRAM(s) Oral daily  benzocaine 15 mG/menthol 3.6 mG (Sugar-Free) Lozenge 1 Lozenge Oral four times a day  dextrose 40% Gel 15 Gram(s) Oral once  dextrose 50% Injectable 25 Gram(s) IV Push once  dextrose 50% Injectable 12.5 Gram(s) IV Push once  dextrose 50% Injectable 25 Gram(s) IV Push once  glucagon  Injectable 1 milliGRAM(s) IntraMuscular once  heparin   Injectable 5000 Unit(s) SubCutaneous every 8 hours  insulin glargine Injectable (LANTUS) 25 Unit(s) SubCutaneous every morning  insulin lispro (ADMELOG) corrective regimen sliding scale   SubCutaneous Before meals and at bedtime  insulin lispro Injectable (ADMELOG) 5 Unit(s) SubCutaneous three times a day before meals  nystatin Powder 1 Application(s) Topical two times a day    MEDICATIONS  (PRN):  phenol 1.4% (CHLORASEPTIC) Oral Spray 1 Spray(s) Topical every 4 hours PRN sore throat    SOCIAL HISTORY:  FAMILY HISTORY:    Vital Signs Last 24 Hrs  T(C): 37.2 (15 Sep 2021 14:00), Max: 37.2 (15 Sep 2021 14:00)  T(F): 98.9 (15 Sep 2021 14:00), Max: 98.9 (15 Sep 2021 14:00)  HR: 85 (15 Sep 2021 14:00) (71 - 85)  BP: 107/65 (15 Sep 2021 14:00) (107/65 - 116/82)  BP(mean): --  RR: 18 (15 Sep 2021 14:00) (18 - 20)  SpO2: 96% (15 Sep 2021 14:00) (95% - 97%)    PHYSICAL EXAM:  GCS 15 BMI42.6 kg/m2  GENERAL: NAD, well-groomed, well-developed  HEAD:  Atraumatic, Normocephalic,   EYES: EOMI, PERRLA, conjunctiva and sclera clear, vision grossly intact  ENMT: No tonsillar erythema, exudates, or enlargement; Moist mucous membranes, Good dentition, No lesions, neg facial   NECK: Supple,  NERVOUS SYSTEM:  Alert & Oriented X3, Good concentration; Motor Strength 5/5 B/L upper and lower extremities; DTRs 2+ intact and symmetric, neg pronators. neg dysmetria.   CHEST/LUNG: Clear BS bilaterally; No rales, rhonchi, wheezing, or rubs  HEART: Regular rate and rhythm; No murmurs, rubs, or gallops  ABDOMEN: Soft, Nontender, Nondistended; Bowel sounds present  EXTREMITIES: large lower extrem full range of motion. no weakness appreciate.   LYMPH: No lymphadenopathy noted  SKIN: No rashes or lesions    LABS:                        12.1   7.30  )-----------( 205      ( 14 Sep 2021 10:01 )             36.6     09-14    140  |  102  |  12.3  ----------------------------<  181<H>  3.9   |  25.0  |  1.07    Ca    9.6      14 Sep 2021 10:01            RADIOLOGY & ADDITIONAL STUDIES:  ~~~~~~~~~~~~~~~~~~~~~~~~~~~~~~  < from: CT Head No Cont (09.07.21 @ 23:36) >   EXAM:  CT CERVICAL SPINE                         EXAM:  CT BRAIN                        PROCEDURE DATE:  09/07/2021    IMPRESSION:  CT head:  No acute intracranial hemorrhage or mass effect.    CT cervical spine:  No CT evidence of cervical spine fracture.  --- End of Report ---  < end of copied text >  < from: MR Head w/ IV Cont (09.15.21 @ 15:08) >     EXAM:  MR ANGIO NECK IC                         EXAM:  MR ANGIO BRAIN                         EXAM:  MR BRAIN IC                        PROCEDURE DATE:  09/15/2021    IMPRESSION:  Corresponding to the abnormal signal seen in the previous MRI,  There is a peripherally enhancing mass with satellite smaller lesions as described, most likely represents primary versus metastatic disease however, the former is more likely secondary to absence of large vasogenic edema. Tissue sampling is suggested.  There is no substantial intracranial arterial stenosis or large vessel occlusion.  There is no significant arterial stenosis in the neck or dissection.  --- End of Report ---  < end of copied text >    < from: CT Chest No Cont (09.07.21 @ 23:43) >   EXAM:  CT CHEST                        PROCEDURE DATE:  09/07/2021    FINDINGS  LUNGS AND AIRWAYS: ET tube tip in the right mainstem bronchus; slight retraction is recommended. Patent central airways.  Streaky left basilar atelectasis.  PLEURA: No pleural effusion.  MEDIASTINUM AND CEE: No lymphadenopathy.  VESSELS: Normal caliber thoracic aorta.  HEART: Heart size is normal. No pericardial effusion.  CHEST WALL AND LOWER NECK: Within normal limits.  VISUALIZED UPPER ABDOMEN: Grossly unremarkable.  BONES: Degenerative changes in the spine.  IMPRESSION:  ET tube tip in the right mainstem bronchus; slight retraction is recommended.  < end of copied text >

## 2021-09-15 NOTE — PROGRESS NOTE ADULT - ASSESSMENT
The patient is a 54 year old female with a past medical history of  prediabetes who  was BIBA after being found unresponsive at home.  Per records, patient was reported to be in a pool of urine when she was found.  EMS reported pt had agonal breathing and attempted to intubate her in the field but were unsuccessful  They also reported BG >400.  Patient was still agonal, unresponsive, hypoxic and hypercapnic on arrival to [facility] ED at which time she was intubated by ER.  Initial work up was consistent w/ DKA. Pt was admitted to MICU.  Labs were significant for pH 6.9, serum HCO3 2, undetectable glucose level on FS and serum BG of 676.  Utox and CTH were negative.   She was started on an insulin gtt, HCO3 gtt and aggressively hydrated w/ IVNS.  ICU course was complicated by a witnessed generalized tonic clonic seizure.  She was given versed and loaded w/ keppra.  Neurology was consulted and placed on cEEG.  Pt was successfully extubated 9/9.  Gap now closed and transitioned off insulin gtt to lantus as of last night.  MR of brain seizure protocol showed abnormal signal in the left parietotemporal lobe possible acute CVA vs post seizure focus.   Assessment/Plan:    1. DKA with coma and severe metabolic acidosis on admission: Now resolved  BSL improved  Hbaic of 16  Will need insulin on discharge  Continue 25 units of Lantus in AM with ademlog 5 units TID with meals   Monitor BSL closely for adjustment  Diabetes education    2. Witnessed seizure: no events captured on vEEG  Spoke with Dr Brewer, EEG continued. Discontinue Keppra  No Driving until follow up with neurology as outpatient    3. Abnormal MRI with possible stroke: No deficits noted on exam  MRI of the brain and MRA pending with IV contrast  Neurology following    4. FREDRICK secondary to DKA on admission resolved    VTE- Lovenox subcut    Code Status; Full Code    Discharge disposition:  Discharge home pending MRI and MRA results.

## 2021-09-15 NOTE — PROGRESS NOTE ADULT - ASSESSMENT
55 y/o F w/ PMH of prediabetes was BIBA after rell found unresponsive at home. She had agonal breathing and she was brought to ER where she was intubated and found to have severe DKA, now resolved after standard treatment.  A1c > 16%     DKA w/ coma and severe metabolic acidosis :   - check Bgs ac tid and hs   -To continue lantus  25  units once a day   -go up on  lispro 8 units tid with meals .  - continue SSI for coverage.  - can discharge on synjardy BID and stop insulin   - Will c/w keppra 500mg bid for now     pseudohyponatremia/ hyperNa : continue iV fluids .monitor CMp     FREDRICK / CKD : resolved.

## 2021-09-15 NOTE — PROGRESS NOTE ADULT - ASSESSMENT
The patient is a 56y Female with seizure in setting of DKA.     Seizure.   Off Keppra now.   may need to resume due to lesion    Brain mass  likely primary brain tumor  suggest Neurosurgery for possible biopsy  no sig edema would not suggest decadron given DM    Case discussed with Dr Darby    will follow with you    Robert Martinez MD PhD   [id number].

## 2021-09-15 NOTE — PROGRESS NOTE ADULT - SUBJECTIVE AND OBJECTIVE BOX
[facility]                                                                     MD Leda Desai,                                               Epilepsy Consult #: [phone] ([phone])                                               Office: [address] [place] [address], [address]                                                 Phone: [phone]; Fax: [phone]                            ==============================================    EPILEPSY FOLLOW-UP NOTE      INTERVAL HISTORY:  No event overnight.     MEDICATIONS  (STANDING):  aspirin enteric coated 81 milliGRAM(s) Oral daily  benzocaine 15 mG/menthol 3.6 mG (Sugar-Free) Lozenge 1 Lozenge Oral four times a day  dextrose 40% Gel 15 Gram(s) Oral once  dextrose 50% Injectable 25 Gram(s) IV Push once  dextrose 50% Injectable 12.5 Gram(s) IV Push once  dextrose 50% Injectable 25 Gram(s) IV Push once  glucagon  Injectable 1 milliGRAM(s) IntraMuscular once  heparin   Injectable 5000 Unit(s) SubCutaneous every 8 hours  insulin glargine Injectable (LANTUS) 25 Unit(s) SubCutaneous every morning  insulin lispro (ADMELOG) corrective regimen sliding scale   SubCutaneous Before meals and at bedtime  insulin lispro Injectable (ADMELOG) 5 Unit(s) SubCutaneous three times a day before meals  nystatin Powder 1 Application(s) Topical two times a day    Vital Signs Last 24 Hrs  T(C): 36.7 (15 Sep 2021 10:57), Max: 36.7 (15 Sep 2021 10:57)  T(F): 98.1 (15 Sep 2021 10:57), Max: 98.1 (15 Sep 2021 10:57)  HR: 80 (15 Sep 2021 10:57) (71 - 80)  BP: 116/82 (15 Sep 2021 10:57) (109/75 - 116/82)  BP(mean): --  RR: 18 (15 Sep 2021 10:57) (18 - 20)  SpO2: 95% (15 Sep 2021 10:57) (95% - 97%)    GENERAL PHYSICAL EXAM:  GEN: no distress   HEENT: NCAT, OP clear  EYES: sclera white, conjunctiva clear, no nystagmus  NECK: supple  CV: RRR, no murmur     		  PULM: CTAB, no wheezing  ABD: soft, +BS, NT  EXT: peripheral pulse intact, no cyanosis  MSK: muscle tone and strength normal  SKIN: warm, dry    NEUROLOGICAL EXAM:  Mental Status  Orientation: alert and oriented to person, place, time, and situation   Language: clear and fluent    Cranial Nerves  II: full visual fields intact   III, IV, VI: PERRL, EOMI  V, VII: facial sensation and movement intact and symmetric   VIII: hearing intact   IX, X: uvula midline, soft palate elevates normally   XI: BL shoulder shrug intact   XII: tongue midline    Motor  5/5 BUE and BLE                 Tone and bulk are normal in upper and lower limbs  No pronator drift    Sensation  Intact to light touch and pinprick in all 4 EXTs    Reflex  2+ in BL biceps and patella                                    Plantar responses downward bilaterally    Coordination  Normal FTN bilaterally    Gait  Deferred       LABS:                            12.1   7.30  )-----------( 205      ( 14 Sep 2021 10:01 )             36.6     09-14    140  |  102  |  12.3  ----------------------------<  181<H>  3.9   |  25.0  |  1.07    Ca    9.6      14 Sep 2021 10:01      CAPILLARY BLOOD GLUCOSE      POCT Blood Glucose.: 155 mg/dL (15 Sep 2021 11:39)  POCT Blood Glucose.: 161 mg/dL (15 Sep 2021 08:20)  POCT Blood Glucose.: 175 mg/dL (15 Sep 2021 06:35)  POCT Blood Glucose.: 269 mg/dL (14 Sep 2021 21:14)  POCT Blood Glucose.: 357 mg/dL (14 Sep 2021 18:09)            09-14-21 @ 07:01  -  09-15-21 @ 07:00  --------------------------------------------------------  IN: 240 mL / OUT: 0 mL / NET: 240 mL        OTHER IMAGING AND STUDIES:    non-elective EMU 9/13 - 9/14/21:  EEG Summary:  Normal EEG in the awake, drowsy and asleep states.    Impression/Clinical Correlate:  9/13 – 9/14: No events or seizures were recorded.    Normal video EEG in awake, drowsy and asleep states.      cvEEG 9/9 - 9/10/21:  EEG SUMMARY/CLASSIFICATION    Abnormal EEG  - Mild to moderate generalized slowing.    _____________________________________________________________  EEG IMPRESSION/CLINICAL CORRELATE    This EEG is consistent with mild to moderate diffuse cerebral dysfunction.  No epileptiform pattern or seizure seen.      cvEEG 9/8 - 9/9/21:  EEG SUMMARY/CLASSIFICATION    Abnormal EEG in a sedated patient.  - Diffuse excessive beta activity.  - Severe generalized slowing.    _____________________________________________________________  EEG IMPRESSION/CLINICAL CORRELATE    This EEG is consistent with severe diffuse cerebral dysfunction.  Diffuse excessive beta activity is likely benzodiazepine side effect.  Sinus tachycardia on single lead ECG  No epileptiform pattern or seizure seen.      < from: MR Brain-Seizure, Epilepsy No Cont (09.13.21 @ 11:42) >  FINDINGS:    The ventricular and sulcal size and configuration is age appropriate. There is a T2/FLAIR hyperintensity involving the left parietotemporal lobe with underlying diffusion restriction. The amount of T2/FLAIR hyperintensity involves both the gray and white matter. There was no abnormality noted in the comparison CT.    There is no intracranial hemorrhage, midline shift or herniation. There is no abnormal extra axial fluid collection or hydrocephalus. There is no abnormal signal in the mesial temporal cortices.    The flow-voids of the major central arterial circulation at the skull base are normal, suggestive of of their patency.    The visualized globes are symmetric bilaterally.    The visualized paranasal sinuses and mastoid bones are adequately developed and aerated.      IMPRESSION:    Abnormal signal in the left parietotemporal lobe most likely represents an acute infarction. There is no hemorrhagic conversion. There is no midline shift or herniation.    Additional differential diagnostic consideration is post seizure focus. Further correlation with MRI of the brain with contrast is suggested to exclude underlying lesion.

## 2021-09-15 NOTE — CONSULT NOTE ADULT - ASSESSMENT
Pt 56yf presented to found unresponsive in a pool of urine.  PT was noted to by tachycardic and was noted to be a difficult intubation.  MRI of the brain demonstrated a peripheral enhancing with satellite smaller lesion.     Incomplete Consult Pt 56yf presented to found unresponsive in a pool of urine.  PT was noted to by tachycardic and was noted to be a difficult intubation.  MRI of the brain demonstrated a peripheral enhancing with satellite smaller lesion.     Plan  1. pt had a mri of the brain which demonstrated enhancing lesion yet, there a part of the study that were not completed  2. Pt needs metastatic work up ct of the chest/ abd / pelvis with and without contrast  3. Pt will need further follow up with reference to FS and sugar mangement  4. asa 81mg   5. Case and films reviewed with Dr. Levy -recommendations as above.   Pt 56yf presented to found unresponsive in a pool of urine.  PT was noted to by tachycardic and was noted to be a difficult intubation.  MRI of the brain demonstrated a peripheral enhancing with satellite smaller lesion.     Plan  1. pt had a mri of the brain which demonstrated enhancing lesion yet, there a part of the study that were not completed GRE, AXT2 call placed to the Mri tech for the reasoning.   2. Pt needs metastatic work up ct of the chest/ abd / pelvis with and without contrast  3. Pt will need further follow up with reference to FS and sugar management  4. asa 81mg   5. Case and films reviewed with Dr. Levy -recommendations as above.

## 2021-09-16 LAB
GLUCOSE BLDC GLUCOMTR-MCNC: 162 MG/DL — HIGH (ref 70–99)
GLUCOSE BLDC GLUCOMTR-MCNC: 191 MG/DL — HIGH (ref 70–99)
GLUCOSE BLDC GLUCOMTR-MCNC: 201 MG/DL — HIGH (ref 70–99)
GLUCOSE BLDC GLUCOMTR-MCNC: 73 MG/DL — SIGNIFICANT CHANGE UP (ref 70–99)
GLUCOSE BLDC GLUCOMTR-MCNC: 88 MG/DL — SIGNIFICANT CHANGE UP (ref 70–99)

## 2021-09-16 PROCEDURE — 99232 SBSQ HOSP IP/OBS MODERATE 35: CPT

## 2021-09-16 PROCEDURE — 99233 SBSQ HOSP IP/OBS HIGH 50: CPT

## 2021-09-16 RX ORDER — ACETAMINOPHEN 500 MG
650 TABLET ORAL ONCE
Refills: 0 | Status: COMPLETED | OUTPATIENT
Start: 2021-09-16 | End: 2021-09-16

## 2021-09-16 RX ORDER — LEVETIRACETAM 250 MG/1
500 TABLET, FILM COATED ORAL
Refills: 0 | Status: DISCONTINUED | OUTPATIENT
Start: 2021-09-16 | End: 2021-09-27

## 2021-09-16 RX ADMIN — Medication 5 UNIT(S): at 08:01

## 2021-09-16 RX ADMIN — BENZOCAINE AND MENTHOL 1 LOZENGE: 5; 1 LIQUID ORAL at 17:18

## 2021-09-16 RX ADMIN — HEPARIN SODIUM 5000 UNIT(S): 5000 INJECTION INTRAVENOUS; SUBCUTANEOUS at 22:35

## 2021-09-16 RX ADMIN — NYSTATIN CREAM 1 APPLICATION(S): 100000 CREAM TOPICAL at 05:09

## 2021-09-16 RX ADMIN — LEVETIRACETAM 500 MILLIGRAM(S): 250 TABLET, FILM COATED ORAL at 17:17

## 2021-09-16 RX ADMIN — HEPARIN SODIUM 5000 UNIT(S): 5000 INJECTION INTRAVENOUS; SUBCUTANEOUS at 05:05

## 2021-09-16 RX ADMIN — BENZOCAINE AND MENTHOL 1 LOZENGE: 5; 1 LIQUID ORAL at 22:34

## 2021-09-16 RX ADMIN — BENZOCAINE AND MENTHOL 1 LOZENGE: 5; 1 LIQUID ORAL at 11:09

## 2021-09-16 RX ADMIN — Medication 3 MILLIGRAM(S): at 22:35

## 2021-09-16 RX ADMIN — Medication 5 UNIT(S): at 11:08

## 2021-09-16 RX ADMIN — Medication 2: at 08:00

## 2021-09-16 RX ADMIN — HEPARIN SODIUM 5000 UNIT(S): 5000 INJECTION INTRAVENOUS; SUBCUTANEOUS at 16:46

## 2021-09-16 RX ADMIN — INSULIN GLARGINE 25 UNIT(S): 100 INJECTION, SOLUTION SUBCUTANEOUS at 11:07

## 2021-09-16 RX ADMIN — NYSTATIN CREAM 1 APPLICATION(S): 100000 CREAM TOPICAL at 17:17

## 2021-09-16 RX ADMIN — Medication 5 UNIT(S): at 16:45

## 2021-09-16 RX ADMIN — Medication 2: at 11:08

## 2021-09-16 RX ADMIN — BENZOCAINE AND MENTHOL 1 LOZENGE: 5; 1 LIQUID ORAL at 05:08

## 2021-09-16 RX ADMIN — Medication 650 MILLIGRAM(S): at 05:22

## 2021-09-16 RX ADMIN — Medication 81 MILLIGRAM(S): at 11:08

## 2021-09-16 RX ADMIN — Medication 4: at 16:45

## 2021-09-16 RX ADMIN — Medication 650 MILLIGRAM(S): at 06:46

## 2021-09-16 NOTE — PROGRESS NOTE ADULT - ASSESSMENT
The patient is a 54 year old female with a past medical history of  prediabetes who  was BIBA after being found unresponsive at home.  Per records, patient was reported to be in a pool of urine when she was found.  EMS reported pt had agonal breathing and attempted to intubate her in the field but were unsuccessful  They also reported BG >400.  Patient was still agonal, unresponsive, hypoxic and hypercapnic on arrival to [facility] ED at which time she was intubated by ER.  Initial work up was consistent w/ DKA. Pt was admitted to MICU.  Labs were significant for pH 6.9, serum HCO3 2, undetectable glucose level on FS and serum BG of 676.  Utox and CTH were negative.   She was started on an insulin gtt, HCO3 gtt and aggressively hydrated w/ IVNS.  ICU course was complicated by a witnessed generalized tonic clonic seizure.  She was given versed and loaded w/ keppra.  Neurology was consulted and placed on cEEG.  Pt was successfully extubated 9/9.  Gap now closed and transitioned off insulin gtt to lantus as of last night.  MR of brain seizure protocol showed abnormal signal in the left parietotemporal lobe possible acute CVA vs post seizure focus.   Assessment/Plan:    1. New lesions in the left frontoparietal region on MRI unclear if primary or secondary: Neurosurgery consulted  CT chest abdomen and pelvis for metastatic work up  Will discuss prophylaxis with keppra given seizure on admission with neurosurgery  Further recommendations pending work up    2. DKA with coma and severe metabolic acidosis on admission: Now resolved  BSL improved  Hbaic of 16  Will need insulin on discharge  Continue 25 units of Lantus in AM with ademlog 5 units TID with meals   Monitor BSL closely for adjustment  Diabetes education    2. Witnessed seizure: no events captured on vEEG  Spoke with Dr Brewer, EEG continued  No Driving until follow up with neurology as outpatient    3. . FREDRICK secondary to DKA on admission resolved    VTE- Lovenox subcut    Code Status; Full Code    Discharge disposition:  Discharge home with home care work up

## 2021-09-16 NOTE — PROGRESS NOTE ADULT - SUBJECTIVE AND OBJECTIVE BOX
HPI:  Pt is a 55 y/o female with reportedly no PMHx questionable "DKA attack" in the past per family was misdiagnosed presents to [facility] ED after being found unresponsive in pool of urine @ home yesterday. Pt reported last known normal in yesterday AM. On arrival by EMS, pt unresponsive, agonal, attempted intubation but was unsuccessful. BS in field >400. Brought to ED subsequently intubated. Labs revealing severe DKA w/ pH 6.9, serum co2 2, undetectable BS on f/s, 676 on BMP. Utox neg. CTH/chest unremarkable. ICU consulted. Seen and examined in ED. Tachycardic, normotensive, saturating 100% on 50% fio2. On 60mcg propofol, 11u insulin.  (08 Sep 2021 01:22)    INTERVAL HPI/OVERNIGHT EVENTS:  Patient seen and examined this morning with Dr. Levy. Patient complaining of Left sided HA. Denies HA prior to hospital visit. Has history of cluster HA which current HA feels similar too    Vital Signs Last 24 Hrs  T(C): 36.7 (16 Sep 2021 04:54), Max: 37.2 (15 Sep 2021 14:00)  T(F): 98.1 (16 Sep 2021 04:54), Max: 98.9 (15 Sep 2021 14:00)  HR: 78 (16 Sep 2021 04:54) (78 - 89)  BP: 115/79 (16 Sep 2021 04:54) (107/65 - 115/79)  BP(mean): --  RR: 18 (16 Sep 2021 04:54) (18 - 18)  SpO2: 95% (16 Sep 2021 04:54) (95% - 96%)    PHYSICAL EXAM:  GENERAL: NAD, well-groomed, well-developed  HEAD:  Atraumatic, normocephalic  DRAINS:   WOUND: Dressing clean dry intact  ZORAIDA COMA SCORE: E- V- M- =       E: 4= opens eyes spontaneously 3= to voice 2= to noxious 1= no opening       V: 5= oriented 4= confused 3= inappropriate words 2= incomprehensible sounds 1= nonverbal 1T= intubated       M: 6= follows commands 5= localizes 4= withdraws 3= flexor posturing 2= extensor posturing 1= no movement  MENTAL STATUS: AAO x3; Awake/Comatose; Opens eyes spontaneously/to voice/to light touch/to noxious stimuli; Appropriately conversant without aphasia/Nonverbal; following simple commands/mimicking/not following commands  CRANIAL NERVES: Visual acuity normal for age, visual fields full to confrontation, PERRL. EOMI without nystagmus. Facial sensation intact V1-3 distribution b/l. Face symmetric w/ normal eye closure and smile, tongue midline. Hearing grossly intact. Speech clear. Head turning and shoulder shrug intact.   REFLEXES: PERRL. Corneals intact b/l. Gag intact. Cough intact. Oculocephalic reflex intact (Doll's eye). Negative Gilliland's b/l. Negative clonus b/l  MOTOR: strength 5/5 b/l upper and lower extremities  Uppers     Delt (C5/6)     Bicep (C5/6)     Wrist Extend (C6)     Tricep (C7)     HG (C8/T1)  R                     5/5                 5/5                         5/5                           5/5                   5/5  L                      5/5                 5/5                         5/5                           5/5                   5/5  Lowers      HF(L1/L2)     KE (L3)     DF (L4)     EHL (L5)     PF (S1)      R                     5/5              5/5           5/5           5/5            5/5  L                     5/5               5/5          5/5            5/5            5/5  SENSATION: grossly intact to light touch all extremities  COORDINATION: Gait intact; rapid alternating movements intact; heel to shin intact; no upper extremity dysmetria  CHEST/LUNG: Clear to auscultation bilaterally; no rales, rhonchi, wheezing, or rubs  HEART: +S1/+S2; Regular rate and rhythm; no murmurs, rubs, or gallops  ABDOMEN: Soft, nontender, nondistended; bowel sounds present all four quadrants  EXTREMITIES:  2+ peripheral pulses, no clubbing, cyanosis, or edema  SKIN: Warm, dry; no rashes or lesions    LABS:  09-15 @ 07:01  -  09-16 @ 07:00  --------------------------------------------------------  IN: 480 mL / OUT: 0 mL / NET: 480 mL        RADIOLOGY & ADDITIONAL TESTS:  MR Head w/ IV Cont (09.15.21 @ 15:08)   IMPRESSION:  Corresponding to the abnormal signal seen in the previous MRI,  There is a peripherally enhancing mass with satellite smaller lesions as described, most likely represents primary versus metastatic disease however, the former is more likely secondary to absence of large vasogenic edema. Tissue sampling is suggested.  There is no substantial intracranial arterial stenosis or large vessel occlusion.  There is no significant arterial stenosis in the neck or dissection.     HPI:  54 year old female with reportedly no PMHx questionable "DKA attack" in the past per family was misdiagnosed presents to [facility] ED after being found unresponsive in pool of urine @ home yesterday. Pt reported last known normal in yesterday AM. On arrival by EMS, pt unresponsive, agonal, attempted intubation but was unsuccessful. BS in field >400. Brought to ED subsequently intubated. Labs revealing severe DKA w/ pH 6.9, serum co2 2, undetectable BS on f/s, 676 on BMP. Utox neg. CTH/chest unremarkable. ICU consulted. Seen and examined in ED. Tachycardic, normotensive, saturating 100% on 50% fio2. On 60mcg propofol, 11u insulin.  (08 Sep 2021 01:22)    INTERVAL HPI/OVERNIGHT EVENTS:  Patient seen and examined this morning with Dr. Levy. Patient complaining of Left sided HA, similar to hx of cluster HAs. Discussed imaging results w/ patient and surgical intervention, agreeing to resection w/ Dr. Levy. Explained risks, alternatives, complications and patient aware, agreeing. Denies vision changes, diplopia, nausea, vomiting     Vital Signs Last 24 Hrs  T(C): 36.7 (16 Sep 2021 04:54), Max: 37.2 (15 Sep 2021 14:00)  T(F): 98.1 (16 Sep 2021 04:54), Max: 98.9 (15 Sep 2021 14:00)  HR: 78 (16 Sep 2021 04:54) (78 - 89)  BP: 115/79 (16 Sep 2021 04:54) (107/65 - 115/79)  BP(mean): --  RR: 18 (16 Sep 2021 04:54) (18 - 18)  SpO2: 95% (16 Sep 2021 04:54) (95% - 96%)    PHYSICAL EXAM:  GENERAL: NAD, well-groomed, well-developed  HEAD:  Atraumatic, normocephalic  ZORAIDA COMA SCORE: E-4 V-5 M-6 = 15       E: 4= opens eyes spontaneously 3= to voice 2= to noxious 1= no opening       V: 5= oriented 4= confused 3= inappropriate words 2= incomprehensible sounds 1= nonverbal 1T= intubated       M: 6= follows commands 5= localizes 4= withdraws 3= flexor posturing 2= extensor posturing 1= no movement  MENTAL STATUS: AAO x3; Awake; Opens eyes spontaneously; Appropriately conversant without aphasia; following simple commands  CRANIAL NERVES: Visual acuity normal for age, visual fields full to confrontation, PERRL. EOMI without nystagmus. Facial sensation intact V1-3 distribution b/l. Face symmetric w/ normal eye closure and smile, tongue midline. Hearing grossly intact. Speech clear. Head turning intact.   MOTOR: strength 5/5 b/l upper and lower extremities; no drift  SENSATION: grossly intact to light touch all extremities  CHEST/LUNG: Nonlabored breaths  HEART: +S1/+S2  SKIN: Warm, dry      LABS:  09-15 @ 07:01  -  09-16 @ 07:00  --------------------------------------------------------  IN: 480 mL / OUT: 0 mL / NET: 480 mL        RADIOLOGY & ADDITIONAL TESTS:  MR Head w/ IV Cont (09.15.21 @ 15:08)   IMPRESSION:  Corresponding to the abnormal signal seen in the previous MRI,  There is a peripherally enhancing mass with satellite smaller lesions as described, most likely represents primary versus metastatic disease however, the former is more likely secondary to absence of large vasogenic edema. Tissue sampling is suggested.  There is no substantial intracranial arterial stenosis or large vessel occlusion.  There is no significant arterial stenosis in the neck or dissection.

## 2021-09-16 NOTE — PROGRESS NOTE ADULT - SUBJECTIVE AND OBJECTIVE BOX
[facility]                                        Neurology at [place]                                 Juan Lee & Anupam                                  [address]                                        [place], [place], [address]                                             [phone]        CC: seizure     HPI:   53 yo woman with DKA and witnessed generalized tonic clonic seizures night of admission, unclear if patient has epilepsy history, however, she was admitted in 8/2020 without a documented history of seizures at that time (MATTHEW)    Interim history:  No seizures since admission.     ROS:   Denies headache or dizziness.  Denies chest pain.  Denies shortness of breath.    MEDICATIONS  (STANDING):  aspirin enteric coated 81 milliGRAM(s) Oral daily  benzocaine 15 mG/menthol 3.6 mG (Sugar-Free) Lozenge 1 Lozenge Oral four times a day  dextrose 40% Gel 15 Gram(s) Oral once  dextrose 50% Injectable 25 Gram(s) IV Push once  dextrose 50% Injectable 12.5 Gram(s) IV Push once  dextrose 50% Injectable 25 Gram(s) IV Push once  glucagon  Injectable 1 milliGRAM(s) IntraMuscular once  heparin   Injectable 5000 Unit(s) SubCutaneous every 8 hours  insulin glargine Injectable (LANTUS) 25 Unit(s) SubCutaneous every morning  insulin lispro (ADMELOG) corrective regimen sliding scale   SubCutaneous Before meals and at bedtime  insulin lispro Injectable (ADMELOG) 5 Unit(s) SubCutaneous three times a day before meals  levETIRAcetam 500 milliGRAM(s) Oral two times a day  melatonin 3 milliGRAM(s) Oral at bedtime  nystatin Powder 1 Application(s) Topical two times a day    MEDICATIONS  (PRN):  phenol 1.4% (CHLORASEPTIC) Oral Spray 1 Spray(s) Topical every 4 hours PRN sore throat      Vital Signs Last 24 Hrs  T(C): 37 (16 Sep 2021 11:00), Max: 37 (16 Sep 2021 11:00)  T(F): 98.6 (16 Sep 2021 11:00), Max: 98.6 (16 Sep 2021 11:00)  HR: 71 (16 Sep 2021 11:00) (71 - 89)  BP: 112/70 (16 Sep 2021 11:00) (111/77 - 115/79)  BP(mean): --  RR: 18 (16 Sep 2021 11:00) (18 - 18)  SpO2: 97% (16 Sep 2021 11:00) (95% - 97%)    Detailed Neurologic Exam:    Mental status: The patient is awake and alert. There is no aphasia. There is no dysarthria.     Cranial nerves: Pupils equal and react symmetrically to light. There is no visual field deficit to threat. Extraocular motion is full with no nystagmus. Facial sensation is intact. Facial musculature is symmetric. Palate elevates symmetrically. Tongue is midline.    Motor: There is normal bulk and tone.  There is no tremor.  Strength grossly 5/5 bilaterally.    Sensation: Grossly intact to light touch and pin.    Reflexes: 2+ throughout and plantar responses are flexor.    Cerebellar: No dysmetria on finger nose testing.    Labs:                  Rad:   MRI brain with contrast- left occipital lesion with rim enhancement, mild restricted diffusion, no blood  MRA head- no aneurysm, AVM, LVO or significant stenosis in COW  MRA neck- no significant stenosis or occlusion    MRI brain: There is an area of abnormal signal in the left parieto-temporal area. The differential diagnosis includes stroke and post-seizure focus.

## 2021-09-16 NOTE — PROGRESS NOTE ADULT - ASSESSMENT
The patient is a 56y Female with seizure in setting of DKA.     Seizure.   Off Keppra now.   may need to resume due to lesion    Brain mass  likely primary brain tumor  Neurosurgery for possible biopsy early next week  no sig edema would not suggest decadron given DM        will follow with you    Robert Martinez MD PhD   [phone].

## 2021-09-16 NOTE — PROGRESS NOTE ADULT - ASSESSMENT
57yo female with history of DM presented with severe DKA (BS in >500s) and new onset seizures. Personally reviewed all imagines, labs, EEG and other studies.    Impression:  1. New onset seizures: Unclear whether bilateral tonic clonic seizures on admission were provoked by hyperglycemia or the newly diagnosed left temporoparietal lesion. No prior history of seizure. Continuous video EEG normal.   2. Left temporoparietal lesion  3. Severe DKA: Resolved.      Recommendation:  - NSG resumed levetiracetam 500mg BID  - pending CT C/A/P  - tumor resection scheduled for next Tue 9/21 per NSG  - endo to manage DM  - seizure precaution  - patient was educated regarding risks and driving privileges associated with the [place] State Guidelines; patient instructed not to drive       No acute intervention from Epilepsy at this time.  Will follow peripherally.   ______________________  Prudence Brewer MD   Director, Epilepsy/EMU - [facility]   Epilepsy Consult #: [phone] ([phone])

## 2021-09-16 NOTE — PROGRESS NOTE ADULT - SUBJECTIVE AND OBJECTIVE BOX
INTERVAL HPI/OVERNIGHT EVENTS:  management of diabetes.    MEDICATIONS  (STANDING):  aspirin enteric coated 81 milliGRAM(s) Oral daily  benzocaine 15 mG/menthol 3.6 mG (Sugar-Free) Lozenge 1 Lozenge Oral four times a day  chlorhexidine 2% Cloths 1 Application(s) Topical <User Schedule>  coronavirus (EUA) Vaccine (NaPopravku) 0.5 milliLiter(s) IntraMuscular once  dextrose 40% Gel 15 Gram(s) Oral once  dextrose 50% Injectable 25 Gram(s) IV Push once  dextrose 50% Injectable 12.5 Gram(s) IV Push once  dextrose 50% Injectable 25 Gram(s) IV Push once  glucagon  Injectable 1 milliGRAM(s) IntraMuscular once  heparin   Injectable 5000 Unit(s) SubCutaneous every 8 hours  insulin glargine Injectable (LANTUS) 20 Unit(s) SubCutaneous every morning  insulin glargine Injectable (LANTUS) 10 Unit(s) SubCutaneous at bedtime  insulin lispro (ADMELOG) corrective regimen sliding scale   SubCutaneous Before meals and at bedtime  insulin lispro Injectable (ADMELOG) 5 Unit(s) SubCutaneous three times a day before meals  nystatin Powder 1 Application(s) Topical two times a day    MEDICATIONS  (PRN):  phenol 1.4% (CHLORASEPTIC) Oral Spray 1 Spray(s) Topical every 4 hours PRN sore throat      Allergies    No Known Allergies      Review of systems:  no cp, no sob, no n/v    Vital Signs Last 24 Hrs  T(C): 37.1 (13 Sep 2021 04:47), Max: 37.1 (13 Sep 2021 04:47)  T(F): 98.7 (13 Sep 2021 04:47), Max: 98.7 (13 Sep 2021 04:47)  HR: 71 (13 Sep 2021 04:47) (71 - 76)  BP: 114/79 (13 Sep 2021 04:47) (114/79 - 114/83)  BP(mean): --  RR: 18 (13 Sep 2021 04:47) (18 - 18)  SpO2: 97% (13 Sep 2021 04:47) (97% - 97%)    PHYSICAL EXAM:  Constitutional: NAD, well-groomed, well-developed  Respiratory: CTAB  Cardiovascular: S1 and S2, RRR, no M/G/R  Gastrointestinal: BS+, soft, no organomegaly or mass  Extremities: No peripheral edema, no pedal lesions  Psychiatric: Normal mood, normal affect  Skin: No rashes, no acanthosis        LABS:    09-12    147<H>  |  112<H>  |  9.5  ----------------------------<  99  3.9   |  20.0<L>  |  0.98    Ca    9.0      12 Sep 2021 06:10

## 2021-09-16 NOTE — PROGRESS NOTE ADULT - ASSESSMENT
55 y/o F w/ PMH of prediabetes was BIBA after rell found unresponsive at home. She had agonal breathing and she was brought to ER where she was intubated and found to have severe DKA, now resolved after standard treatment.  A1c > 16%     DKA w/ coma and severe metabolic acidosis :   - check Bgs ac tid and hs   -To continue lantus  25  units once a day  and go up on  lispro 8 units tid with meals .  - continue SSI for coverage.  - can discharge on synjardy BID and stop insulin   - Will c/w keppra 500mg bid for now     pseudohyponatremia/ hyperNa : continue iV fluids .monitor CMp     FREDRICK / CKD : resolved.

## 2021-09-16 NOTE — PROGRESS NOTE ADULT - ASSESSMENT
Plan  - Q4 neuro checks while in house  - HOB 30 degrees  - Keppra 500 BID  - Pain control PRN  - Normotensive  - Pending mets w/u: CT Chest/Abd/Pelvis   -   -  -   - D/w Dr. Levy 54F s/p ?DKA exacerbation on admission, found to have L occipital brain lesion.         Plan  - Q4 neuro checks while in house  - HOB 30 degrees  - Keppra 500 BID  - Pain control PRN  - Normotensive  - Pending mets w/u: CT Chest/Abd/Pelvis   - Imaging reviewed, discussed w/ patient  - Left crani for tumor resection tentatively scheduled for next Tuesday 9/21/21  - Explained risks, alternatives, complications and patient aware, agreeing.  - Medical management/supportive care per primary team  - D/w Dr. Levy

## 2021-09-16 NOTE — PROGRESS NOTE ADULT - SUBJECTIVE AND OBJECTIVE BOX
CC: Follow up    INTERVAL HPI/OVERNIGHT EVENTS: Patient seen and examined, feels well no acute complaints overnight Anxious about testing and further plan       Vital Signs Last 24 Hrs  T(C): 36.7 (16 Sep 2021 04:54), Max: 37.2 (15 Sep 2021 14:00)  T(F): 98.1 (16 Sep 2021 04:54), Max: 98.9 (15 Sep 2021 14:00)  HR: 78 (16 Sep 2021 04:54) (78 - 89)  BP: 115/79 (16 Sep 2021 04:54) (107/65 - 115/79)  BP(mean): --  RR: 18 (16 Sep 2021 04:54) (18 - 18)  SpO2: 95% (16 Sep 2021 04:54) (95% - 96%)    PHYSICAL EXAM:    GENERAL: NAD, AOX3  HEAD:  Atraumatic, Normocephalic  EYES: conjunctiva and sclera clear  ENMT: Moist mucous membranes  NECK: Supple, No JVD  NERVOUS SYSTEM:  Alert & Oriented X3, Motor Strength 5/5 B/L upper and lower extremities;  CHEST/LUNG: Clear to auscultation bilaterally; No rales, rhonchi, wheezing, or rubs  HEART: Regular rate and rhythm; No murmurs, rubs, or gallops  ABDOMEN: Soft, Nontender, Nondistended; Bowel sounds present  EXTREMITIES:  2+ Peripheral Pulses, No clubbing, cyanosis, or edema        MEDICATIONS  (STANDING):  aspirin enteric coated 81 milliGRAM(s) Oral daily  benzocaine 15 mG/menthol 3.6 mG (Sugar-Free) Lozenge 1 Lozenge Oral four times a day  dextrose 40% Gel 15 Gram(s) Oral once  dextrose 50% Injectable 25 Gram(s) IV Push once  dextrose 50% Injectable 12.5 Gram(s) IV Push once  dextrose 50% Injectable 25 Gram(s) IV Push once  glucagon  Injectable 1 milliGRAM(s) IntraMuscular once  heparin   Injectable 5000 Unit(s) SubCutaneous every 8 hours  insulin glargine Injectable (LANTUS) 25 Unit(s) SubCutaneous every morning  insulin lispro (ADMELOG) corrective regimen sliding scale   SubCutaneous Before meals and at bedtime  insulin lispro Injectable (ADMELOG) 5 Unit(s) SubCutaneous three times a day before meals  melatonin 3 milliGRAM(s) Oral at bedtime  nystatin Powder 1 Application(s) Topical two times a day    MEDICATIONS  (PRN):  phenol 1.4% (CHLORASEPTIC) Oral Spray 1 Spray(s) Topical every 4 hours PRN sore throat      Allergies    No Known Allergies    Intolerances          LABS:                  RADIOLOGY & ADDITIONAL TESTS:

## 2021-09-16 NOTE — PROGRESS NOTE ADULT - SUBJECTIVE AND OBJECTIVE BOX
[facility]                                                                     MD Leda Desai DO                                              Epilepsy Consult #: [phone] ([phone])                                               Office: [address] [place] [address], [address]                                                 Phone: [phone]; Fax: [phone]                            ==============================================    EPILEPSY FOLLOW-UP NOTE      INTERVAL HISTORY:  MRI brain w/wo shows a lesion in the left temporoparietal region, concerning for primary VS metastatic disease. Pending CT C/A/P. Had a lengthy discussion this morning about decision to start antiepileptic medication. At this time, we are still unclear whether bilateral tonic clonic seizures on admission were provoked by hyperglycemia or the left temporoparietal lesion. Patient has never had any seizure prior to this. At the end of the conversation, patient said she would like to wait for the result of CT before making any further decision. However, later seen by NSG and started on levetiracetam 500mg BID. Plan for resection next Tue 9/21.      MEDICATIONS  (STANDING):  aspirin enteric coated 81 milliGRAM(s) Oral daily  benzocaine 15 mG/menthol 3.6 mG (Sugar-Free) Lozenge 1 Lozenge Oral four times a day  dextrose 40% Gel 15 Gram(s) Oral once  dextrose 50% Injectable 25 Gram(s) IV Push once  dextrose 50% Injectable 12.5 Gram(s) IV Push once  dextrose 50% Injectable 25 Gram(s) IV Push once  glucagon  Injectable 1 milliGRAM(s) IntraMuscular once  heparin   Injectable 5000 Unit(s) SubCutaneous every 8 hours  insulin glargine Injectable (LANTUS) 25 Unit(s) SubCutaneous every morning  insulin lispro (ADMELOG) corrective regimen sliding scale   SubCutaneous Before meals and at bedtime  insulin lispro Injectable (ADMELOG) 5 Unit(s) SubCutaneous three times a day before meals  levETIRAcetam 500 milliGRAM(s) Oral two times a day  melatonin 3 milliGRAM(s) Oral at bedtime  nystatin Powder 1 Application(s) Topical two times a day    Vital Signs Last 24 Hrs  T(C): 36.7 (16 Sep 2021 04:54), Max: 36.9 (15 Sep 2021 18:25)  T(F): 98.1 (16 Sep 2021 04:54), Max: 98.4 (15 Sep 2021 18:25)  HR: 78 (16 Sep 2021 04:54) (78 - 89)  BP: 115/79 (16 Sep 2021 04:54) (111/77 - 115/79)  BP(mean): --  RR: 18 (16 Sep 2021 04:54) (18 - 18)  SpO2: 95% (16 Sep 2021 04:54) (95% - 96%)    GENERAL PHYSICAL EXAM:  GEN: no distress   HEENT: NCAT, OP clear  EYES: sclera white, conjunctiva clear, no nystagmus  NECK: supple  CV: RRR, no murmur     		  PULM: CTAB, no wheezing  ABD: soft, +BS, NT  EXT: peripheral pulse intact, no cyanosis  MSK: muscle tone and strength normal  SKIN: warm, dry    NEUROLOGICAL EXAM:  Mental Status  Orientation: alert and oriented to person, place, time, and situation   Language: clear and fluent    Cranial Nerves  II: full visual fields intact   III, IV, VI: PERRL, EOMI  V, VII: facial sensation and movement intact and symmetric   VIII: hearing intact   IX, X: uvula midline, soft palate elevates normally   XI: BL shoulder shrug intact   XII: tongue midline    Motor  5/5 BUE and BLE                 Tone and bulk are normal in upper and lower limbs  No pronator drift    Sensation  Intact to light touch and pinprick in all 4 EXTs    Reflex  2+ in BL biceps and patella                                    Plantar responses downward bilaterally    Coordination  Normal FTN bilaterally    Gait  Deferred       LABS:    CAPILLARY BLOOD GLUCOSE      POCT Blood Glucose.: 191 mg/dL (16 Sep 2021 11:03)  POCT Blood Glucose.: 162 mg/dL (16 Sep 2021 07:49)  POCT Blood Glucose.: 234 mg/dL (15 Sep 2021 21:48)  POCT Blood Glucose.: 155 mg/dL (15 Sep 2021 18:06)            09-15-21 @ 07:01  -  09-16-21 @ 07:00  --------------------------------------------------------  IN: 480 mL / OUT: 0 mL / NET: 480 mL          OTHER IMAGING AND STUDIES:    non-elective EMU 9/13 - 9/14/21:  EEG Summary:  Normal EEG in the awake, drowsy and asleep states.    Impression/Clinical Correlate:  9/13 – 9/14: No events or seizures were recorded.    Normal video EEG in awake, drowsy and asleep states.      cvEEG 9/9 - 9/10/21:  EEG SUMMARY/CLASSIFICATION    Abnormal EEG  - Mild to moderate generalized slowing.    _____________________________________________________________  EEG IMPRESSION/CLINICAL CORRELATE    This EEG is consistent with mild to moderate diffuse cerebral dysfunction.  No epileptiform pattern or seizure seen.      cvEEG 9/8 - 9/9/21:  EEG SUMMARY/CLASSIFICATION    Abnormal EEG in a sedated patient.  - Diffuse excessive beta activity.  - Severe generalized slowing.    _____________________________________________________________  EEG IMPRESSION/CLINICAL CORRELATE    This EEG is consistent with severe diffuse cerebral dysfunction.  Diffuse excessive beta activity is likely benzodiazepine side effect.  Sinus tachycardia on single lead ECG  No epileptiform pattern or seizure seen.      < from: MRI Brain, MRA Head/Neck w/ IV Cont (09.15.21 @ 15:08) >  MRI brain:    Corresponding to the FLAIR hyperintensity and diffuse diffusion restriction signal in the left temporoparietal region, there is a heterogeneous and peripherally enhancing lesion measuring 2.5 x 1.5 x 2.9 cm in AP, TRV and craniocaudal dimensions. Superoposterior to the lesion and best seen on series 5 image 16 series 3 image 18 there are small ring-enhancing lesions, largest of which measures 4 mm.  There is extension of the lesion close to the posterior horn of the left lateral ventricle without extension into the ependyma. No additional lesions are identified in thebrain parenchyma.    The ventricles, cisterns and sulci are normal in size, shape and configuration. There is no hydrocephalus. There is no midline shift or herniation.    There is no extra-axial fluid collection or hydrocephalus.    The flow-voids of the major central arterial circulation at the skull base are normal, suggestive of of their patency.    The visualized globes are symmetric bilaterally. The paranasal sinuses and tympanomastoid air cells are clear.    MRA neck:  There is no significant stenosis of the origins of the great vessels from the aortic arch.    Right carotid: The right common carotid artery shows no significant stenosis. There is no significant narrowing involving the right carotid bifurcation and proximal right internal carotid artery. The remainder of the right internal carotid artery to the skull base shows no significant narrowing. Distal right ICA lumen diameter is 4.2 mm.    Left carotid: The left common carotid artery is intact. There is no significant narrowing involving the left carotid bifurcation and proximal left internal carotid artery. The remainder of the left internal carotid artery to the skull base shows no significant narrowing. Distal left ICA lumen diameter is 4.4 mm.    Vertebral arteries:There is flow seen in both cervical vertebral arteries without evidence of high-grade stenosis.      MRA brain:  The bilateral petrous and cavernous carotid arteries are patent. The bilateral anterior and middle cerebral arteries are patent.    The distal vertebral arteries are patent. The basilar artery is patent. The posterior cerebral arteries and superior cerebellar arteries are patent.    There is no evidence of substantial arterial stenosis or occlusion.  There is no evidence of a dominant aneurysm or vascular malformation.      IMPRESSION:    Corresponding to the abnormal signal seen in the previous MRI,    There is a peripherally enhancing mass with satellite smaller lesions as described, most likely represents primary versus metastatic disease however, the former is more likely secondary to absence of large vasogenic edema. Tissue sampling is suggested.    There is no substantial intracranial arterial stenosis or large vessel occlusion.    There is no significant arterial stenosis in the neck or dissection.        < from: MR Brain-Seizure, Epilepsy No Cont (09.13.21 @ 11:42) >  FINDINGS:    The ventricular and sulcal size and configuration is age appropriate. There is a T2/FLAIR hyperintensity involving the left parietotemporal lobe with underlying diffusion restriction. The amount of T2/FLAIR hyperintensity involves both the gray and white matter. There was no abnormality noted in the comparison CT.    There is no intracranial hemorrhage, midline shift or herniation. There is no abnormal extra axial fluid collection or hydrocephalus. There is no abnormal signal in the mesial temporal cortices.    The flow-voids of the major central arterial circulation at the skull base are normal, suggestive of of their patency.    The visualized globes are symmetric bilaterally.    The visualized paranasal sinuses and mastoid bones are adequately developed and aerated.      IMPRESSION:    Abnormal signal in the left parietotemporal lobe most likely represents an acute infarction. There is no hemorrhagic conversion. There is no midline shift or herniation.    Additional differential diagnostic consideration is post seizure focus. Further correlation with MRI of the brain with contrast is suggested to exclude underlying lesion.

## 2021-09-17 LAB
ANION GAP SERPL CALC-SCNC: 10 MMOL/L — SIGNIFICANT CHANGE UP (ref 5–17)
BUN SERPL-MCNC: 9.4 MG/DL — SIGNIFICANT CHANGE UP (ref 8–20)
CALCIUM SERPL-MCNC: 9.2 MG/DL — SIGNIFICANT CHANGE UP (ref 8.6–10.2)
CHLORIDE SERPL-SCNC: 104 MMOL/L — SIGNIFICANT CHANGE UP (ref 98–107)
CO2 SERPL-SCNC: 28 MMOL/L — SIGNIFICANT CHANGE UP (ref 22–29)
CREAT SERPL-MCNC: 0.86 MG/DL — SIGNIFICANT CHANGE UP (ref 0.5–1.3)
GLUCOSE BLDC GLUCOMTR-MCNC: 102 MG/DL — HIGH (ref 70–99)
GLUCOSE BLDC GLUCOMTR-MCNC: 103 MG/DL — HIGH (ref 70–99)
GLUCOSE BLDC GLUCOMTR-MCNC: 113 MG/DL — HIGH (ref 70–99)
GLUCOSE BLDC GLUCOMTR-MCNC: 255 MG/DL — HIGH (ref 70–99)
GLUCOSE BLDC GLUCOMTR-MCNC: 84 MG/DL — SIGNIFICANT CHANGE UP (ref 70–99)
GLUCOSE SERPL-MCNC: 126 MG/DL — HIGH (ref 70–99)
POTASSIUM SERPL-MCNC: 4.2 MMOL/L — SIGNIFICANT CHANGE UP (ref 3.5–5.3)
POTASSIUM SERPL-SCNC: 4.2 MMOL/L — SIGNIFICANT CHANGE UP (ref 3.5–5.3)
SODIUM SERPL-SCNC: 142 MMOL/L — SIGNIFICANT CHANGE UP (ref 135–145)

## 2021-09-17 PROCEDURE — 99232 SBSQ HOSP IP/OBS MODERATE 35: CPT

## 2021-09-17 PROCEDURE — 74178 CT ABD&PLV WO CNTR FLWD CNTR: CPT | Mod: 26

## 2021-09-17 PROCEDURE — 99233 SBSQ HOSP IP/OBS HIGH 50: CPT

## 2021-09-17 PROCEDURE — 71260 CT THORAX DX C+: CPT | Mod: 26

## 2021-09-17 RX ADMIN — NYSTATIN CREAM 1 APPLICATION(S): 100000 CREAM TOPICAL at 06:07

## 2021-09-17 RX ADMIN — HEPARIN SODIUM 5000 UNIT(S): 5000 INJECTION INTRAVENOUS; SUBCUTANEOUS at 21:24

## 2021-09-17 RX ADMIN — Medication 81 MILLIGRAM(S): at 18:25

## 2021-09-17 RX ADMIN — LEVETIRACETAM 500 MILLIGRAM(S): 250 TABLET, FILM COATED ORAL at 18:25

## 2021-09-17 RX ADMIN — BENZOCAINE AND MENTHOL 1 LOZENGE: 5; 1 LIQUID ORAL at 18:27

## 2021-09-17 RX ADMIN — HEPARIN SODIUM 5000 UNIT(S): 5000 INJECTION INTRAVENOUS; SUBCUTANEOUS at 18:25

## 2021-09-17 RX ADMIN — Medication 6: at 21:24

## 2021-09-17 RX ADMIN — LEVETIRACETAM 500 MILLIGRAM(S): 250 TABLET, FILM COATED ORAL at 06:07

## 2021-09-17 RX ADMIN — HEPARIN SODIUM 5000 UNIT(S): 5000 INJECTION INTRAVENOUS; SUBCUTANEOUS at 06:07

## 2021-09-17 RX ADMIN — NYSTATIN CREAM 1 APPLICATION(S): 100000 CREAM TOPICAL at 18:24

## 2021-09-17 RX ADMIN — BENZOCAINE AND MENTHOL 1 LOZENGE: 5; 1 LIQUID ORAL at 06:07

## 2021-09-17 RX ADMIN — Medication 3 MILLIGRAM(S): at 21:24

## 2021-09-17 NOTE — PROGRESS NOTE ADULT - ASSESSMENT
The patient is a 54 year old female with a past medical history of  prediabetes who  was BIBA after being found unresponsive at home.  Per records, patient was reported to be in a pool of urine when she was found.  EMS reported pt had agonal breathing and attempted to intubate her in the field but were unsuccessful  They also reported BG >400.  Patient was still agonal, unresponsive, hypoxic and hypercapnic on arrival to [facility] ED at which time she was intubated by ER.  Initial work up was consistent w/ DKA. Pt was admitted to MICU.  Labs were significant for pH 6.9, serum HCO3 2, undetectable glucose level on FS and serum BG of 676.  Utox and CTH were negative.   She was started on an insulin gtt, HCO3 gtt and aggressively hydrated w/ IVNS.  ICU course was complicated by a witnessed generalized tonic clonic seizure.  She was given versed and loaded w/ keppra.  Neurology was consulted and placed on cEEG.  Pt was successfully extubated 9/9.  Gap closed and transitioned off insulin gtt to lantus as of last BID.  MR of brain seizure protocol showed abnormal signal in the left parietotemporal lobe possible acute CVA vs post seizure focus. EEG was negative and taken off Keppra. Insulin was titrated down to lantus OD and ademlog TID with meals. MRI of the brain with contrast showed a lesion in the left temporoparietal lobe with no surrounding edema. Neurosurgery was consulted. Started on empiric keppra. CT C/A/P ordered to rule out metastatic disease. Tentative plan for OR resection by neurosurgery on 09/21    Assessment/Plan:    1. New lesions in the left frontoparietal region on MRI unclear if primary or secondary: Neurosurgery consulted  CT chest abdomen and pelvis for metastatic work up pending. Expedited to today  PO keppra for prophylaxis  Tentative plan by neurosurgery for OR resection on 09/21    2. DKA with coma and severe metabolic acidosis on admission: Now resolved\  BSL is controlled   Hbaic of 16  Will need insulin on discharge  Continue 25 units of Lantus in AM with ademlog 5 units TID with meals   Monitor BSL closely for adjustment  Diabetes education    2. Witnessed seizure: no events captured on vEEG  Spoke with Dr Brewer, EEG was  Now started on PO Keppra for seizure prophylaxis in the setting of new mass on MRI   No Driving until follow up with neurology as outpatient    3. . FREDRICK secondary to DKA on admission resolved    VTE- Lovenox subcut    Code Status; Full Code    Discharge disposition:  Remains acutely ill with further work up and management pending.   Ambulating independently.    Hospital Course:     The patient is a 54 year old female with a past medical history of  prediabetes who  was BIBA after being found unresponsive at home.  Per records, patient was reported to be in a pool of urine when she was found.  EMS reported pt had agonal breathing and attempted to intubate her in the field but were unsuccessful  They also reported BG >400.  Patient was still agonal, unresponsive, hypoxic and hypercapnic on arrival to [facility] ED at which time she was intubated by ER.  Initial work up was consistent w/ DKA. Pt was admitted to MICU.  Labs were significant for pH 6.9, serum HCO3 2, undetectable glucose level on FS and serum BG of 676.  Utox and CTH were negative.   She was started on an insulin gtt, HCO3 gtt and aggressively hydrated w/ IVNS.  ICU course was complicated by a witnessed generalized tonic clonic seizure.  She was given versed and loaded w/ keppra.  Neurology was consulted and placed on cEEG.  Pt was successfully extubated 9/9.  Gap closed and transitioned off insulin gtt to lantus as of last BID.  MR of brain seizure protocol showed abnormal signal in the left parietotemporal lobe possible acute CVA vs post seizure focus. EEG was negative and taken off Keppra. Insulin was titrated down to lantus OD and ademlog TID with meals. MRI of the brain with contrast showed a lesion in the left temporoparietal lobe with no surrounding edema. Neurosurgery was consulted. Started on empiric keppra. CT C/A/P ordered to rule out metastatic disease. Tentative plan for OR resection by neurosurgery on 09/21    Assessment/Plan:    1. New lesions in the left frontoparietal region on MRI unclear if primary or secondary: Neurosurgery consulted  CT chest abdomen and pelvis for metastatic work up pending. Expedited to today  PO keppra for prophylaxis  Tentative plan by neurosurgery for OR resection on 09/21    2. DKA with coma and severe metabolic acidosis on admission: Now resolved\  BSL is controlled   Hbaic of 16  Will need insulin on discharge  Continue 25 units of Lantus in AM with ademlog 5 units TID with meals   Monitor BSL closely for adjustment  Diabetes education    2. Witnessed seizure: no events captured on vEEG  Spoke with Dr Brewer, EEG was  Now started on PO Keppra for seizure prophylaxis in the setting of new mass on MRI   No Driving until follow up with neurology as outpatient    3. . FREDRICK secondary to DKA on admission resolved    VTE- Lovenox subcut    Code Status; Full Code    Discharge disposition:  Remains acutely ill with further work up and management pending.   Ambulating independently.

## 2021-09-17 NOTE — PROGRESS NOTE ADULT - ASSESSMENT
56F s/p ?DKA exacerbation on admission, found to have L occipital brain lesion.         Plan  - Q4 neuro checks while in house  - HOB 30 degrees  - Keppra 500 BID  - Pain control PRN  - Normotensive  - Pending mets w/u: CT Chest/Abd/Pelvis today  - Imaging reviewed, discussed w/ patient  - Left crani for tumor resection tentatively scheduled for next Tuesday 9/21/21  - New MRI Brain w/ fiducials   - Explained risks, alternatives, complications and patient aware, agreeing.   - Medical management/supportive care per primary team  - D/w Dr. Levy 56F s/p ?DKA exacerbation on admission, found to have L occipital brain lesion.         Plan  - Q4 neuro checks while in house; Please allow protected sleep from 11pm-4am  - HOB 30 degrees  - Keppra 500 BID  - Pain control PRN  - Normotensive  - Pending mets w/u: CT Chest/Abd/Pelvis today  - Imaging reviewed, discussed w/ patient  - Left crani for tumor resection tentatively scheduled for next Tuesday 9/21/21  - New MRI Brain w/ fiducials   - Explained risks, alternatives, complications and patient aware, agreeing.   - Medical management/supportive care per primary team  - D/w Dr. Levy

## 2021-09-17 NOTE — PROGRESS NOTE ADULT - ASSESSMENT
56y Female with seizure in setting of DKA.     Seizure.   Back on Keppra now.     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   Biopsy/resection next week.  No sig edema would not suggest decadron given DM.

## 2021-09-17 NOTE — PROGRESS NOTE ADULT - SUBJECTIVE AND OBJECTIVE BOX
CC: Follow up    INTERVAL HPI/OVERNIGHT EVENTS: Patient seen and examined, no acute complaints overnight. Dry cough persists. Denies sob, afebrile. Ambulating independently around the room. Awaiting CT this morning.       Vital Signs Last 24 Hrs  T(C): 36.6 (17 Sep 2021 06:11), Max: 37 (16 Sep 2021 11:00)  T(F): 97.9 (17 Sep 2021 06:11), Max: 98.6 (16 Sep 2021 11:00)  HR: 88 (17 Sep 2021 06:11) (71 - 88)  BP: 115/77 (17 Sep 2021 06:11) (112/70 - 115/77)  BP(mean): --  RR: 18 (17 Sep 2021 06:11) (18 - 18)  SpO2: 99% (17 Sep 2021 06:11) (97% - 99%)    PHYSICAL EXAM:    GENERAL: NAD, AOX3  HEAD:  Atraumatic, Normocephalic  EYES: conjunctiva and sclera clear  ENMT: Moist mucous membranes  NECK: Supple, No JVD  NERVOUS SYSTEM:  Alert & Oriented X3, Motor Strength 5/5 B/L upper and lower extremities  CHEST/LUNG: Clear to auscultation bilaterally; No rales, rhonchi, wheezing, or rubs  HEART: Regular rate and rhythm; No murmurs, rubs, or gallops  ABDOMEN: Soft, Nontender, Nondistended; Bowel sounds present  EXTREMITIES:  2+ Peripheral Pulses, No clubbing, cyanosis, or edema        MEDICATIONS  (STANDING):  aspirin enteric coated 81 milliGRAM(s) Oral daily  benzocaine 15 mG/menthol 3.6 mG (Sugar-Free) Lozenge 1 Lozenge Oral four times a day  dextrose 40% Gel 15 Gram(s) Oral once  dextrose 50% Injectable 25 Gram(s) IV Push once  dextrose 50% Injectable 12.5 Gram(s) IV Push once  dextrose 50% Injectable 25 Gram(s) IV Push once  glucagon  Injectable 1 milliGRAM(s) IntraMuscular once  heparin   Injectable 5000 Unit(s) SubCutaneous every 8 hours  insulin glargine Injectable (LANTUS) 25 Unit(s) SubCutaneous every morning  insulin lispro (ADMELOG) corrective regimen sliding scale   SubCutaneous Before meals and at bedtime  insulin lispro Injectable (ADMELOG) 5 Unit(s) SubCutaneous three times a day before meals  levETIRAcetam 500 milliGRAM(s) Oral two times a day  melatonin 3 milliGRAM(s) Oral at bedtime  nystatin Powder 1 Application(s) Topical two times a day    MEDICATIONS  (PRN):  phenol 1.4% (CHLORASEPTIC) Oral Spray 1 Spray(s) Topical every 4 hours PRN sore throat      Allergies    No Known Allergies    Intolerances          LABS:      09-17    142  |  104  |  9.4  ----------------------------<  126<H>  4.2   |  28.0  |  0.86    Ca    9.2      17 Sep 2021 09:29            RADIOLOGY & ADDITIONAL TESTS:

## 2021-09-17 NOTE — PROGRESS NOTE ADULT - ASSESSMENT
53 y/o F w/ PMH of prediabetes was BIBA after rell found unresponsive at home. She had agonal breathing and she was brought to ER where she was intubated and found to have severe DKA, now resolved after standard treatment.  A1c > 16%     DM, uncontrolled.  -To continue lantus  25  units once a day  and go up on  lispro 8 units tid with meals .  - continue SSI for coverage.  - check Bgs ac tid and hs   - can discharge on synjardy BID and stop insulin   - Will c/w keppra 500mg bid for now     pseudohyponatremia/ hyperNa : continue iV fluids .monitor CMp     FREDRICK / CKD : resolved.         53 y/o F w/ PMH of prediabetes was BIBA after rell found unresponsive at home. She had agonal breathing and she was brought to ER where she was intubated and found to have severe DKA, now resolved after standard treatment.  A1c > 16%     DM, uncontrolled.  -To continue lantus  25  units once a day  and go up on  lispro 8 units tid with meals .  - continue SSI for coverage.  - check Bgs ac tid and hs   - can discharge on synjardy BID and stop insulin   - Will c/w keppra 500mg bid for now     pseudohyponatremia/ hyperNa : continue iV fluids .monitor CMp     FREDRICK / CKD : resolved.        Brain mass  Likely primary brain tumor  Neurosurgery and neurology following on it  Biopsy/resection probably  next week.

## 2021-09-17 NOTE — PROGRESS NOTE ADULT - SUBJECTIVE AND OBJECTIVE BOX
HPI:  54 year old female with reportedly no PMHx questionable "DKA attack" in the past per family was misdiagnosed presents to [facility] ED after being found unresponsive in pool of urine @ home yesterday. Pt reported last known normal in yesterday AM. On arrival by EMS, pt unresponsive, agonal, attempted intubation but was unsuccessful. BS in field >400. Brought to ED subsequently intubated. Labs revealing severe DKA w/ pH 6.9, serum co2 2, undetectable BS on f/s, 676 on BMP. Utox neg. CTH/chest unremarkable. ICU consulted. Seen and examined in ED. Tachycardic, normotensive, saturating 100% on 50% fio2. On 60mcg propofol, 11u insulin.  (08 Sep 2021 01:22)    INTERVAL HPI/OVERNIGHT EVENTS:  Patient seen and examined this morning with Dr. Levy. Patient complaining of Left sided HA, similar to hx of cluster HAs. Discussed imaging results w/ patient and surgical intervention, agreeing to resection w/ Dr. Levy. Explained risks, alternatives, complications and patient aware, agreeing. Denies vision changes, diplopia, nausea, vomiting. Dr. Lu discussed surgical intervention w/ patient and agreeing to OR next week.     Vital Signs Last 24 Hrs  T(C): 36.6 (17 Sep 2021 06:11), Max: 37 (16 Sep 2021 11:00)  T(F): 97.9 (17 Sep 2021 06:11), Max: 98.6 (16 Sep 2021 11:00)  HR: 88 (17 Sep 2021 06:11) (71 - 88)  BP: 115/77 (17 Sep 2021 06:11) (112/70 - 115/77)  BP(mean): --  RR: 18 (17 Sep 2021 06:11) (18 - 18)  SpO2: 99% (17 Sep 2021 06:11) (97% - 99%)    PHYSICAL EXAM:  GENERAL: NAD, well-groomed, well-developed  HEAD:  Atraumatic, normocephalic  ZORAIDA COMA SCORE: E-4 V-5 M-6 = 15       E: 4= opens eyes spontaneously 3= to voice 2= to noxious 1= no opening       V: 5= oriented 4= confused 3= inappropriate words 2= incomprehensible sounds 1= nonverbal 1T= intubated       M: 6= follows commands 5= localizes 4= withdraws 3= flexor posturing 2= extensor posturing 1= no movement  MENTAL STATUS: AAO x3; Awake; Opens eyes spontaneously; Appropriately conversant without aphasia; following simple commands  CRANIAL NERVES: Visual acuity normal for age, visual fields full to confrontation, PERRL. EOMI without nystagmus. Facial sensation intact V1-3 distribution b/l. Face symmetric w/ normal eye closure and smile, tongue midline. Hearing grossly intact. Speech clear. Head turning intact.   MOTOR: strength 5/5 b/l upper and lower extremities; no drift  SENSATION: grossly intact to light touch all extremities  CHEST/LUNG: Nonlabored breaths  HEART: +S1/+S2  SKIN: Warm, dry    LABS:  09-17    142  |  104  |  9.4  ----------------------------<  126<H>  4.2   |  28.0  |  0.86    Ca    9.2      17 Sep 2021 09:29              RADIOLOGY & ADDITIONAL TESTS:  MR Head w/ IV Cont (09.15.21 @ 15:08)   IMPRESSION:  Corresponding to the abnormal signal seen in the previous MRI,  There is a peripherally enhancing mass with satellite smaller lesions as described, most likely represents primary versus metastatic disease however, the former is more likely secondary to absence of large vasogenic edema. Tissue sampling is suggested.  There is no substantial intracranial arterial stenosis or large vessel occlusion.  There is no significant arterial stenosis in the neck or dissection. HPI:  54 year old female with reportedly no PMHx questionable "DKA attack" in the past per family was misdiagnosed presents to [facility] ED after being found unresponsive in pool of urine @ home yesterday. Pt reported last known normal in yesterday AM. On arrival by EMS, pt unresponsive, agonal, attempted intubation but was unsuccessful. BS in field >400. Brought to ED subsequently intubated. Labs revealing severe DKA w/ pH 6.9, serum co2 2, undetectable BS on f/s, 676 on BMP. Utox neg. CTH/chest unremarkable. ICU consulted. Seen and examined in ED. Tachycardic, normotensive, saturating 100% on 50% fio2. On 60mcg propofol, 11u insulin.  (08 Sep 2021 01:22)    INTERVAL HPI/OVERNIGHT EVENTS:  Patient seen and examined this morning with Dr. Levy. Discussed imaging results w/ patient and surgical intervention, agreeing to resection w/ Dr. Levy. Explained risks, alternatives, complications and patient aware, agreeing. Denies vision changes, diplopia, nausea, vomiting. Dr. Lu discussed surgical intervention w/ patient and agreeing to OR next week.     Vital Signs Last 24 Hrs  T(C): 36.6 (17 Sep 2021 06:11), Max: 37 (16 Sep 2021 11:00)  T(F): 97.9 (17 Sep 2021 06:11), Max: 98.6 (16 Sep 2021 11:00)  HR: 88 (17 Sep 2021 06:11) (71 - 88)  BP: 115/77 (17 Sep 2021 06:11) (112/70 - 115/77)  BP(mean): --  RR: 18 (17 Sep 2021 06:11) (18 - 18)  SpO2: 99% (17 Sep 2021 06:11) (97% - 99%)    PHYSICAL EXAM:  GENERAL: NAD, well-groomed, well-developed  HEAD:  Atraumatic, normocephalic  ZORAIDA COMA SCORE: E-4 V-5 M-6 = 15       E: 4= opens eyes spontaneously 3= to voice 2= to noxious 1= no opening       V: 5= oriented 4= confused 3= inappropriate words 2= incomprehensible sounds 1= nonverbal 1T= intubated       M: 6= follows commands 5= localizes 4= withdraws 3= flexor posturing 2= extensor posturing 1= no movement  MENTAL STATUS: AAO x3; Awake; Opens eyes spontaneously; Appropriately conversant without aphasia; following simple commands  CRANIAL NERVES: Visual acuity normal for age, visual fields full to confrontation, PERRL. EOMI without nystagmus. Facial sensation intact V1-3 distribution b/l. Face symmetric w/ normal eye closure and smile, tongue midline. Hearing grossly intact. Speech clear. Head turning intact.   MOTOR: strength 5/5 b/l upper and lower extremities; no drift  SENSATION: grossly intact to light touch all extremities  CHEST/LUNG: Nonlabored breaths  HEART: +S1/+S2  SKIN: Warm, dry      LABS:  09-17    142  |  104  |  9.4  ----------------------------<  126<H>  4.2   |  28.0  |  0.86    Ca    9.2      17 Sep 2021 09:29          RADIOLOGY & ADDITIONAL TESTS:  MR Head w/ IV Cont (09.15.21 @ 15:08)   IMPRESSION:  Corresponding to the abnormal signal seen in the previous MRI,  There is a peripherally enhancing mass with satellite smaller lesions as described, most likely represents primary versus metastatic disease however, the former is more likely secondary to absence of large vasogenic edema. Tissue sampling is suggested.  There is no substantial intracranial arterial stenosis or large vessel occlusion.  There is no significant arterial stenosis in the neck or dissection.

## 2021-09-17 NOTE — PROGRESS NOTE ADULT - SUBJECTIVE AND OBJECTIVE BOX
INTERVAL HPI/OVERNIGHT EVENTS:  management of diabetes.    MEDICATIONS  (STANDING):  aspirin enteric coated 81 milliGRAM(s) Oral daily  benzocaine 15 mG/menthol 3.6 mG (Sugar-Free) Lozenge 1 Lozenge Oral four times a day  chlorhexidine 2% Cloths 1 Application(s) Topical <User Schedule>  coronavirus (EUA) Vaccine (Quartix) 0.5 milliLiter(s) IntraMuscular once  dextrose 40% Gel 15 Gram(s) Oral once  dextrose 50% Injectable 25 Gram(s) IV Push once  dextrose 50% Injectable 12.5 Gram(s) IV Push once  dextrose 50% Injectable 25 Gram(s) IV Push once  glucagon  Injectable 1 milliGRAM(s) IntraMuscular once  heparin   Injectable 5000 Unit(s) SubCutaneous every 8 hours  insulin glargine Injectable (LANTUS) 20 Unit(s) SubCutaneous every morning  insulin glargine Injectable (LANTUS) 10 Unit(s) SubCutaneous at bedtime  insulin lispro (ADMELOG) corrective regimen sliding scale   SubCutaneous Before meals and at bedtime  insulin lispro Injectable (ADMELOG) 5 Unit(s) SubCutaneous three times a day before meals  nystatin Powder 1 Application(s) Topical two times a day    MEDICATIONS  (PRN):  phenol 1.4% (CHLORASEPTIC) Oral Spray 1 Spray(s) Topical every 4 hours PRN sore throat      Allergies    No Known Allergies      Review of systems:  no cp, no sob, no n/v    Vital Signs Last 24 Hrs  T(C): 37.1 (13 Sep 2021 04:47), Max: 37.1 (13 Sep 2021 04:47)  T(F): 98.7 (13 Sep 2021 04:47), Max: 98.7 (13 Sep 2021 04:47)  HR: 71 (13 Sep 2021 04:47) (71 - 76)  BP: 114/79 (13 Sep 2021 04:47) (114/79 - 114/83)  BP(mean): --  RR: 18 (13 Sep 2021 04:47) (18 - 18)  SpO2: 97% (13 Sep 2021 04:47) (97% - 97%)    PHYSICAL EXAM:  Constitutional: NAD, well-groomed, well-developed  Respiratory: CTAB  Cardiovascular: S1 and S2, RRR, no M/G/R  Gastrointestinal: BS+, soft, no organomegaly or mass  Extremities: No peripheral edema, no pedal lesions  Psychiatric: Normal mood, normal affect  Skin: No rashes, no acanthosis        LABS:    09-12    147<H>  |  112<H>  |  9.5  ----------------------------<  99  3.9   |  20.0<L>  |  0.98    Ca    9.0      12 Sep 2021 06:10       INTERVAL HPI/OVERNIGHT EVENTS:  management of diabetes.    MEDICATIONS  (STANDING):  aspirin enteric coated 81 milliGRAM(s) Oral daily  benzocaine 15 mG/menthol 3.6 mG (Sugar-Free) Lozenge 1 Lozenge Oral four times a day  chlorhexidine 2% Cloths 1 Application(s) Topical <User Schedule>  coronavirus (EUA) Vaccine (mPura) 0.5 milliLiter(s) IntraMuscular once  dextrose 40% Gel 15 Gram(s) Oral once  dextrose 50% Injectable 25 Gram(s) IV Push once  dextrose 50% Injectable 12.5 Gram(s) IV Push once  dextrose 50% Injectable 25 Gram(s) IV Push once  glucagon  Injectable 1 milliGRAM(s) IntraMuscular once  heparin   Injectable 5000 Unit(s) SubCutaneous every 8 hours  insulin glargine Injectable (LANTUS) 20 Unit(s) SubCutaneous every morning  insulin glargine Injectable (LANTUS) 10 Unit(s) SubCutaneous at bedtime  insulin lispro (ADMELOG) corrective regimen sliding scale   SubCutaneous Before meals and at bedtime  insulin lispro Injectable (ADMELOG) 5 Unit(s) SubCutaneous three times a day before meals  nystatin Powder 1 Application(s) Topical two times a day    MEDICATIONS  (PRN):  phenol 1.4% (CHLORASEPTIC) Oral Spray 1 Spray(s) Topical every 4 hours PRN sore throat      Allergies    No Known Allergies      Review of systems:  some fatigue, no cp, no sob, no n/v    Vital Signs Last 24 Hrs  T(C): 37.1 (13 Sep 2021 04:47), Max: 37.1 (13 Sep 2021 04:47)  T(F): 98.7 (13 Sep 2021 04:47), Max: 98.7 (13 Sep 2021 04:47)  HR: 71 (13 Sep 2021 04:47) (71 - 76)  BP: 114/79 (13 Sep 2021 04:47) (114/79 - 114/83)  BP(mean): --  RR: 18 (13 Sep 2021 04:47) (18 - 18)  SpO2: 97% (13 Sep 2021 04:47) (97% - 97%)    PHYSICAL EXAM:  Constitutional: NAD, well-groomed, well-developed  Respiratory: CTAB  Cardiovascular: S1 and S2, RRR, no M/G/R  Gastrointestinal: BS+, soft, no organomegaly or mass  Extremities: No peripheral edema, no pedal lesions  Psychiatric: Normal mood, normal affect  Skin: No rashes, no acanthosis        LABS:    09-12    147<H>  |  112<H>  |  9.5  ----------------------------<  99  3.9   |  20.0<L>  |  0.98    Ca    9.0      12 Sep 2021 06:10

## 2021-09-17 NOTE — PROGRESS NOTE ADULT - SUBJECTIVE AND OBJECTIVE BOX
[facility]                                        [facility]                                 [facility]                                  [address]                                        [place], [place], [address]                                             [phone]        CC: seizure and brain tumor     HPI:   55 yo woman with DKA and witnessed generalized tonic clonic seizures night of admission, unclear if patient has epilepsy history, however, she was admitted in 8/2020 without a documented history of seizures at that time.  Imaging confirmed a brain mass.     Interim history:  Remains on 6 [place].     ROS:   Denies headache or dizziness.  Denies chest pain.  Denies shortness of breath.    MEDICATIONS  (STANDING):  aspirin enteric coated 81 milliGRAM(s) Oral daily  benzocaine 15 mG/menthol 3.6 mG (Sugar-Free) Lozenge 1 Lozenge Oral four times a day  dextrose 40% Gel 15 Gram(s) Oral once  glucagon  Injectable 1 milliGRAM(s) IntraMuscular once  heparin   Injectable 5000 Unit(s) SubCutaneous every 8 hours  insulin glargine Injectable (LANTUS) 25 Unit(s) SubCutaneous every morning  insulin lispro (ADMELOG) corrective regimen sliding scale   SubCutaneous Before meals and at bedtime  insulin lispro Injectable (ADMELOG) 5 Unit(s) SubCutaneous three times a day before meals  levETIRAcetam 500 milliGRAM(s) Oral two times a day  melatonin 3 milliGRAM(s) Oral at bedtime  nystatin Powder 1 Application(s) Topical two times a day      Vital Signs Last 24 Hrs  T(C): 36.6 (17 Sep 2021 06:11), Max: 36.6 (17 Sep 2021 06:11)  T(F): 97.9 (17 Sep 2021 06:11), Max: 97.9 (17 Sep 2021 06:11)  HR: 88 (17 Sep 2021 06:11) (88 - 88)  BP: 115/77 (17 Sep 2021 06:11) (115/77 - 115/77)  RR: 18 (17 Sep 2021 06:11) (18 - 18)  SpO2: 99% (17 Sep 2021 06:11) (99% - 99%)    Detailed Neurologic Exam:    Mental status: The patient is awake and alert. There is no aphasia. There is no dysarthria.     Cranial nerves: Pupils equal and react symmetrically to light. There is no visual field deficit to threat. Extraocular motion is full with no nystagmus. Facial sensation is intact. Facial musculature is symmetric. Palate elevates symmetrically. Tongue is midline.    Motor: There is normal bulk and tone.  There is no tremor.  Strength grossly 5/5 bilaterally.    Sensation: Grossly intact to light touch and pin.    Reflexes: 2+ throughout and plantar responses are flexor.    Cerebellar: No dysmetria on finger nose testing.    Labs:     09-17    142  |  104  |  9.4  ----------------------------<  126<H>  4.2   |  28.0  |  0.86    Ca    9.2      17 Sep 2021 09:29

## 2021-09-18 LAB
GLUCOSE BLDC GLUCOMTR-MCNC: 130 MG/DL — HIGH (ref 70–99)
GLUCOSE BLDC GLUCOMTR-MCNC: 141 MG/DL — HIGH (ref 70–99)
GLUCOSE BLDC GLUCOMTR-MCNC: 144 MG/DL — HIGH (ref 70–99)
GLUCOSE BLDC GLUCOMTR-MCNC: 169 MG/DL — HIGH (ref 70–99)

## 2021-09-18 PROCEDURE — 99232 SBSQ HOSP IP/OBS MODERATE 35: CPT

## 2021-09-18 PROCEDURE — 99233 SBSQ HOSP IP/OBS HIGH 50: CPT

## 2021-09-18 RX ADMIN — NYSTATIN CREAM 1 APPLICATION(S): 100000 CREAM TOPICAL at 16:10

## 2021-09-18 RX ADMIN — LEVETIRACETAM 500 MILLIGRAM(S): 250 TABLET, FILM COATED ORAL at 05:33

## 2021-09-18 RX ADMIN — HEPARIN SODIUM 5000 UNIT(S): 5000 INJECTION INTRAVENOUS; SUBCUTANEOUS at 16:10

## 2021-09-18 RX ADMIN — HEPARIN SODIUM 5000 UNIT(S): 5000 INJECTION INTRAVENOUS; SUBCUTANEOUS at 05:33

## 2021-09-18 RX ADMIN — Medication 5 UNIT(S): at 12:07

## 2021-09-18 RX ADMIN — Medication 2: at 16:09

## 2021-09-18 RX ADMIN — INSULIN GLARGINE 25 UNIT(S): 100 INJECTION, SOLUTION SUBCUTANEOUS at 09:40

## 2021-09-18 RX ADMIN — BENZOCAINE AND MENTHOL 1 LOZENGE: 5; 1 LIQUID ORAL at 12:09

## 2021-09-18 RX ADMIN — NYSTATIN CREAM 1 APPLICATION(S): 100000 CREAM TOPICAL at 05:33

## 2021-09-18 RX ADMIN — Medication 5 UNIT(S): at 09:40

## 2021-09-18 RX ADMIN — Medication 81 MILLIGRAM(S): at 12:07

## 2021-09-18 RX ADMIN — LEVETIRACETAM 500 MILLIGRAM(S): 250 TABLET, FILM COATED ORAL at 16:15

## 2021-09-18 RX ADMIN — BENZOCAINE AND MENTHOL 1 LOZENGE: 5; 1 LIQUID ORAL at 05:33

## 2021-09-18 RX ADMIN — Medication 5 UNIT(S): at 16:09

## 2021-09-18 NOTE — PROGRESS NOTE ADULT - SUBJECTIVE AND OBJECTIVE BOX
HPI:  Pt is a 53 y/o female with reportedly no PMHx questionable "DKA attack" in the past per family was misdiagnosed presents to [facility] ED after being found unresponsive in pool of urine @ home yesterday. Pt reported last known normal in yesterday AM. On arrival by EMS, pt unresponsive, agonal, attempted intubation but was unsuccessful. BS in field >400. Brought to ED subsequently intubated. Labs revealing severe DKA w/ pH 6.9, serum co2 2, undetectable BS on f/s, 676 on BMP. Utox neg. CTH/chest unremarkable. ICU consulted.   Seen and examined in ED. Tachycardic, normotensive, saturating 100% on 50% fio2. On 60mcg propofol, 11u insulin.  (08 Sep 2021 01:22)      INTERVAL HPI/OVERNIGHT EVENTS:  Pt seen and evaluated this morning with Dr. Lu. Pt continues to agree to move forward with surgery. All questions answered by attending. No acute events reported from over night.        Vital Signs Last 24 Hrs  T(C): 36.8 (18 Sep 2021 10:58), Max: 36.8 (18 Sep 2021 10:58)  T(F): 98.2 (18 Sep 2021 10:58), Max: 98.2 (18 Sep 2021 10:58)  HR: 73 (18 Sep 2021 10:58) (73 - 73)  BP: 115/74 (18 Sep 2021 10:58) (115/74 - 117/70)  BP(mean): --  RR: 19 (18 Sep 2021 10:58) (18 - 19)  SpO2: 94% (18 Sep 2021 10:58) (94% - 94%)      PHYSICAL EXAM:  GENERAL: NAD, well-groomed, well-developed  HEAD:  Atraumatic, normocephalic  MENTAL STATUS: AAO x3; Awake; Opens eyes spontaneously; Appropriately conversant without aphasia; following simple commands  CRANIAL NERVES: SILAS; EOMI; no facial asymmetry; facial sensation grossly intact to light touch b/l;  tongue midline; palate rises symmetrically  MOTOR: strength 5/5 B/L upper and lower extremities; sensation grossly intact all extremities  CHEST/LUNG: +breath sounds bilaterally; no rales, rhonchi, wheezing, appreciated  HEART: +S1/+S2; Regular rate and rhythm; no murmurs, rubs  ABDOMEN: Soft, nontender, nondistended; bowel sounds present   EXTREMITIES:  no clubbing, cyanosis  SKIN: Warm, dry      LABS:    09-17    142  |  104  |  9.4  ----------------------------<  126<H>  4.2   |  28.0  |  0.86    Ca    9.2      17 Sep 2021 09:29        09-17 @ 07:01  -  09-18 @ 07:00  --------------------------------------------------------  IN: 240 mL / OUT: 0 mL / NET: 240 mL

## 2021-09-18 NOTE — PROGRESS NOTE ADULT - SUBJECTIVE AND OBJECTIVE BOX
INTERVAL HPI/ OVERNIGHT EVENTS:     Patient seen and examined at bedside. Chart reviewed. No acute overnight events. No current complaints.   CT chest/ abd reviewed with pt:  5 mm lung nodule inseparable from left major fissure. This was not seen on the prior study although atelectatic changes were present. Trace left pleural fluid. 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     Vital Signs Last 24 Hrs  T(C): 36.8 (18 Sep 2021 10:58), Max: 36.8 (18 Sep 2021 10:58)  T(F): 98.2 (18 Sep 2021 10:58), Max: 98.2 (18 Sep 2021 10:58)  HR: 73 (18 Sep 2021 10:58) (73 - 73)  BP: 115/74 (18 Sep 2021 10:58) (115/74 - 117/70)  BP(mean): --  RR: 19 (18 Sep 2021 10:58) (18 - 19)  SpO2: 94% (18 Sep 2021 10:58) (94% - 94%)  PHYSICAL EXAM:    GENERAL: NAD, AOX3  HEAD:  Atraumatic, Normocephalic  EYES: conjunctiva and sclera clear  ENMT: Moist mucous membranes  NECK: Supple, No JVD  NERVOUS SYSTEM:  Alert & Oriented X3, Motor Strength 5/5 B/L upper and lower extremities  CHEST/LUNG: Clear to auscultation bilaterally; No rales, rhonchi, wheezing, or rubs  HEART: Regular rate and rhythm; No murmurs, rubs, or gallops  ABDOMEN: Soft, Nontender, Nondistended; Bowel sounds present  EXTREMITIES:  2+ Peripheral Pulses, No clubbing, cyanosis, or edema    MEDICATIONS  (STANDING):  aspirin enteric coated 81 milliGRAM(s) Oral daily  benzocaine 15 mG/menthol 3.6 mG (Sugar-Free) Lozenge 1 Lozenge Oral four times a day  dextrose 40% Gel 15 Gram(s) Oral once  dextrose 50% Injectable 25 Gram(s) IV Push once  dextrose 50% Injectable 12.5 Gram(s) IV Push once  dextrose 50% Injectable 25 Gram(s) IV Push once  glucagon  Injectable 1 milliGRAM(s) IntraMuscular once  heparin   Injectable 5000 Unit(s) SubCutaneous every 8 hours  insulin glargine Injectable (LANTUS) 25 Unit(s) SubCutaneous every morning  insulin lispro (ADMELOG) corrective regimen sliding scale   SubCutaneous Before meals and at bedtime  insulin lispro Injectable (ADMELOG) 5 Unit(s) SubCutaneous three times a day before meals  levETIRAcetam 500 milliGRAM(s) Oral two times a day  melatonin 3 milliGRAM(s) Oral at bedtime  nystatin Powder 1 Application(s) Topical two times a day    MEDICATIONS  (PRN):  phenol 1.4% (CHLORASEPTIC) Oral Spray 1 Spray(s) Topical every 4 hours PRN sore throat      LABS:    09-17    142  |  104  |  9.4  ----------------------------<  126<H>  4.2   |  28.0  |  0.86    Ca    9.2      17 Sep 2021 09:29

## 2021-09-18 NOTE — PROGRESS NOTE ADULT - ASSESSMENT
56y Female with seizure in setting of DKA.     Seizure.   Back on Keppra now.     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   Biopsy/resection next week.  No sig edema would not suggest decadron given DM.    CT chest showing 5 mm lung nodule in left major fissure.   Plan per medicine.

## 2021-09-18 NOTE — PROGRESS NOTE ADULT - ASSESSMENT
Hospital Course:     The patient is a 54 year old female with a past medical history of  prediabetes who  was BIBA after being found unresponsive at home.  Per records, patient was reported to be in a pool of urine when she was found.  EMS reported pt had agonal breathing and attempted to intubate her in the field but were unsuccessful  They also reported BG >400.  Patient was still agonal, unresponsive, hypoxic and hypercapnic on arrival to [facility] ED at which time she was intubated by ER.  Initial work up was consistent w/ DKA. Pt was admitted to MICU.  Labs were significant for pH 6.9, serum HCO3 2, undetectable glucose level on FS and serum BG of 676.  Utox and CTH were negative.   She was started on an insulin gtt, HCO3 gtt and aggressively hydrated w/ IVNS.  ICU course was complicated by a witnessed generalized tonic clonic seizure.  She was given versed and loaded w/ keppra.  Neurology was consulted and placed on cEEG.  Pt was successfully extubated 9/9.  Gap closed and transitioned off insulin gtt to lantus as of last BID.  MR of brain seizure protocol showed abnormal signal in the left parietotemporal lobe possible acute CVA vs post seizure focus. EEG was negative and taken off Keppra. Insulin was titrated down to lantus OD and ademlog TID with meals. MRI of the brain with contrast showed a lesion in the left temporoparietal lobe with no surrounding edema. Neurosurgery was consulted. Started on empiric keppra. CT C/A/P ordered to rule out metastatic disease. Tentative plan for OR resection by neurosurgery on 09/21    Assessment/Plan:    1. New lesions in the left frontoparietal region on MRI unclear if primary or secondary: Neurosurgery consulted  CT chest abdomen and pelvis reviewed with pt. 5 mm lung nodule inseparable from left major fissure.  PO keppra for prophylaxis  Tentative plan by neurosurgery for OR resection on 09/21    2. DKA with coma and severe metabolic acidosis on admission: Now resolved  BSL is controlled   Hbaic of 16  Will need insulin on discharge  Continue 25 units of Lantus in AM with ademlog 5 units TID with meals   Monitor BSL closely for adjustment  Diabetes education    2. Witnessed seizure: no events captured on vEEG  PO Keppra for seizure prophylaxis in the setting of new mass on MRI   No Driving until follow up with neurology as outpatient    3. . FREDRICK secondary to DKA on admission resolved    VTE- Lovenox subcut    Code Status; Full Code    Discharge disposition:  Remains acutely ill with further work up and management pending.   Ambulating independently.

## 2021-09-18 NOTE — PROGRESS NOTE ADULT - ASSESSMENT
53 Y/O female admitted after found unresponsive, MRI of the brain demonstrated a peripheral enhancing with satellite smaller lesion, pt scheduled for surgery 9/21/21.    -Pt seen and case discussed w/ Dr. Lu  - Q4 neuro checks w/ protected sleep from 11pm-4am  - HOB 30 degrees  - Keppra 500 BID  - Pain control PRN  - maintain systolic BP  - Left crani for tumor resection tentatively scheduled for next Tuesday 9/21/21  - New MRI Brain w/ fiducials   - Explained risks, alternatives, complications and patient aware, agreeing.        55 Y/O female admitted after found unresponsive, MRI of the brain demonstrated a peripheral enhancing with satellite smaller lesion, pt scheduled for surgery 9/21/21.    -Pt seen and case discussed w/ Dr. Lu  -all questions answered  - Q4 neuro checks w/ protected sleep from 11pm-4am  - HOB 30 degrees  - Keppra 500 BID  - Pain control PRN  - maintain systolic BP  - Left crani for tumor resection tentatively scheduled for next Tuesday 9/21/21  - New MRI Brain w/ fiducials   - continue to follow

## 2021-09-18 NOTE — PROGRESS NOTE ADULT - SUBJECTIVE AND OBJECTIVE BOX
[facility]                                        [facility] [place]                                 Juan Lee & Anupam                                  [address]                                        [place], [place], [address]                                             [phone]        CC: seizure and brain tumor     HPI:   53 yo woman with DKA and witnessed generalized tonic clonic seizures night of admission, unclear if patient has epilepsy history, however, she was admitted in 8/2020 without a documented history of seizures at that time.  Imaging confirmed a brain mass.     Interim history:  Remains on [address] [place].     ROS:   Denies headache or dizziness.  Denies chest pain.  Denies shortness of breath.    MEDICATIONS  (STANDING):  aspirin enteric coated 81 milliGRAM(s) Oral daily  benzocaine 15 mG/menthol 3.6 mG (Sugar-Free) Lozenge 1 Lozenge Oral four times a day  dextrose 40% Gel 15 Gram(s) Oral once  dextrose 50% Injectable 25 Gram(s) IV Push once  dextrose 50% Injectable 12.5 Gram(s) IV Push once  dextrose 50% Injectable 25 Gram(s) IV Push once  glucagon  Injectable 1 milliGRAM(s) IntraMuscular once  heparin   Injectable 5000 Unit(s) SubCutaneous every 8 hours  insulin glargine Injectable (LANTUS) 25 Unit(s) SubCutaneous every morning  insulin lispro (ADMELOG) corrective regimen sliding scale   SubCutaneous Before meals and at bedtime  insulin lispro Injectable (ADMELOG) 5 Unit(s) SubCutaneous three times a day before meals  levETIRAcetam 500 milliGRAM(s) Oral two times a day  melatonin 3 milliGRAM(s) Oral at bedtime  nystatin Powder 1 Application(s) Topical two times a day      Vital Signs Last 24 Hrs  T(C): 36.7 (18 Sep 2021 04:49), Max: 36.8 (17 Sep 2021 11:00)  T(F): 98.1 (18 Sep 2021 04:49), Max: 98.2 (17 Sep 2021 11:00)  HR: 73 (18 Sep 2021 04:49) (73 - 78)  BP: 117/70 (18 Sep 2021 04:49) (117/70 - 118/70)  RR: 18 (18 Sep 2021 04:49) (18 - 18)  SpO2: 96% (17 Sep 2021 11:00) (96% - 96%)    Detailed Neurologic Exam:    Mental status: The patient is awake and alert. There is no aphasia. There is no dysarthria.     Cranial nerves: Pupils equal and react symmetrically to light. There is no visual field deficit to threat. Extraocular motion is full with no nystagmus. Facial sensation is intact. Facial musculature is symmetric. Palate elevates symmetrically. Tongue is midline.    Motor: There is normal bulk and tone.  There is no tremor.  Strength grossly 5/5 bilaterally.    Sensation: Grossly intact to light touch and pin.    Reflexes: 2+ throughout and plantar responses are flexor.    Cerebellar: No dysmetria on finger nose testing.    Labs:     09-17    142  |  104  |  9.4  ----------------------------<  126<H>  4.2   |  28.0  |  0.86    Ca    9.2      17 Sep 2021 09:29

## 2021-09-19 LAB
ALBUMIN SERPL ELPH-MCNC: 2.8 G/DL — LOW (ref 3.3–5.2)
ALP SERPL-CCNC: 51 U/L — SIGNIFICANT CHANGE UP (ref 40–120)
ALT FLD-CCNC: 35 U/L — HIGH
ANION GAP SERPL CALC-SCNC: 9 MMOL/L — SIGNIFICANT CHANGE UP (ref 5–17)
AST SERPL-CCNC: 28 U/L — SIGNIFICANT CHANGE UP
BASOPHILS # BLD AUTO: 0.03 K/UL — SIGNIFICANT CHANGE UP (ref 0–0.2)
BASOPHILS NFR BLD AUTO: 0.6 % — SIGNIFICANT CHANGE UP (ref 0–2)
BILIRUB SERPL-MCNC: 0.3 MG/DL — LOW (ref 0.4–2)
BUN SERPL-MCNC: 6.7 MG/DL — LOW (ref 8–20)
CALCIUM SERPL-MCNC: 9.3 MG/DL — SIGNIFICANT CHANGE UP (ref 8.6–10.2)
CHLORIDE SERPL-SCNC: 101 MMOL/L — SIGNIFICANT CHANGE UP (ref 98–107)
CO2 SERPL-SCNC: 29 MMOL/L — SIGNIFICANT CHANGE UP (ref 22–29)
CREAT SERPL-MCNC: 0.75 MG/DL — SIGNIFICANT CHANGE UP (ref 0.5–1.3)
EOSINOPHIL # BLD AUTO: 0.12 K/UL — SIGNIFICANT CHANGE UP (ref 0–0.5)
EOSINOPHIL NFR BLD AUTO: 2.2 % — SIGNIFICANT CHANGE UP (ref 0–6)
GLUCOSE BLDC GLUCOMTR-MCNC: 151 MG/DL — HIGH (ref 70–99)
GLUCOSE BLDC GLUCOMTR-MCNC: 151 MG/DL — HIGH (ref 70–99)
GLUCOSE BLDC GLUCOMTR-MCNC: 152 MG/DL — HIGH (ref 70–99)
GLUCOSE BLDC GLUCOMTR-MCNC: 183 MG/DL — HIGH (ref 70–99)
GLUCOSE SERPL-MCNC: 140 MG/DL — HIGH (ref 70–99)
HCT VFR BLD CALC: 33.3 % — LOW (ref 34.5–45)
HGB BLD-MCNC: 10.7 G/DL — LOW (ref 11.5–15.5)
IMM GRANULOCYTES NFR BLD AUTO: 0.6 % — SIGNIFICANT CHANGE UP (ref 0–1.5)
LYMPHOCYTES # BLD AUTO: 2.35 K/UL — SIGNIFICANT CHANGE UP (ref 1–3.3)
LYMPHOCYTES # BLD AUTO: 43.7 % — SIGNIFICANT CHANGE UP (ref 13–44)
MAGNESIUM SERPL-MCNC: 1.8 MG/DL — SIGNIFICANT CHANGE UP (ref 1.6–2.6)
MCHC RBC-ENTMCNC: 31.3 PG — SIGNIFICANT CHANGE UP (ref 27–34)
MCHC RBC-ENTMCNC: 32.1 GM/DL — SIGNIFICANT CHANGE UP (ref 32–36)
MCV RBC AUTO: 97.4 FL — SIGNIFICANT CHANGE UP (ref 80–100)
MONOCYTES # BLD AUTO: 0.46 K/UL — SIGNIFICANT CHANGE UP (ref 0–0.9)
MONOCYTES NFR BLD AUTO: 8.6 % — SIGNIFICANT CHANGE UP (ref 2–14)
NEUTROPHILS # BLD AUTO: 2.39 K/UL — SIGNIFICANT CHANGE UP (ref 1.8–7.4)
NEUTROPHILS NFR BLD AUTO: 44.3 % — SIGNIFICANT CHANGE UP (ref 43–77)
PHOSPHATE SERPL-MCNC: 3.6 MG/DL — SIGNIFICANT CHANGE UP (ref 2.4–4.7)
PLATELET # BLD AUTO: 281 K/UL — SIGNIFICANT CHANGE UP (ref 150–400)
POTASSIUM SERPL-MCNC: 4.1 MMOL/L — SIGNIFICANT CHANGE UP (ref 3.5–5.3)
POTASSIUM SERPL-SCNC: 4.1 MMOL/L — SIGNIFICANT CHANGE UP (ref 3.5–5.3)
PROT SERPL-MCNC: 5.4 G/DL — LOW (ref 6.6–8.7)
RBC # BLD: 3.42 M/UL — LOW (ref 3.8–5.2)
RBC # FLD: 15.9 % — HIGH (ref 10.3–14.5)
SODIUM SERPL-SCNC: 139 MMOL/L — SIGNIFICANT CHANGE UP (ref 135–145)
WBC # BLD: 5.38 K/UL — SIGNIFICANT CHANGE UP (ref 3.8–10.5)
WBC # FLD AUTO: 5.38 K/UL — SIGNIFICANT CHANGE UP (ref 3.8–10.5)

## 2021-09-19 PROCEDURE — 99232 SBSQ HOSP IP/OBS MODERATE 35: CPT

## 2021-09-19 PROCEDURE — 99233 SBSQ HOSP IP/OBS HIGH 50: CPT

## 2021-09-19 RX ADMIN — LEVETIRACETAM 500 MILLIGRAM(S): 250 TABLET, FILM COATED ORAL at 16:32

## 2021-09-19 RX ADMIN — Medication 2: at 16:28

## 2021-09-19 RX ADMIN — Medication 81 MILLIGRAM(S): at 16:28

## 2021-09-19 RX ADMIN — Medication 2: at 21:15

## 2021-09-19 RX ADMIN — INSULIN GLARGINE 25 UNIT(S): 100 INJECTION, SOLUTION SUBCUTANEOUS at 09:13

## 2021-09-19 RX ADMIN — Medication 5 UNIT(S): at 16:28

## 2021-09-19 RX ADMIN — Medication 5 UNIT(S): at 12:17

## 2021-09-19 RX ADMIN — Medication 5 UNIT(S): at 09:13

## 2021-09-19 RX ADMIN — Medication 2: at 12:17

## 2021-09-19 RX ADMIN — Medication 2: at 09:13

## 2021-09-19 RX ADMIN — LEVETIRACETAM 500 MILLIGRAM(S): 250 TABLET, FILM COATED ORAL at 06:41

## 2021-09-19 NOTE — PROGRESS NOTE ADULT - ASSESSMENT
Hospital Course:     The patient is a 54 year old female with a past medical history of  prediabetes who  was BIBA after being found unresponsive at home.  Per records, patient was reported to be in a pool of urine when she was found.  EMS reported pt had agonal breathing and attempted to intubate her in the field but were unsuccessful  They also reported BG >400.  Patient was still agonal, unresponsive, hypoxic and hypercapnic on arrival to [facility] ED at which time she was intubated by ER.  Initial work up was consistent w/ DKA. Pt was admitted to MICU.  Labs were significant for pH 6.9, serum HCO3 2, undetectable glucose level on FS and serum BG of 676.  Utox and CTH were negative.   She was started on an insulin gtt, HCO3 gtt and aggressively hydrated w/ IVNS.  ICU course was complicated by a witnessed generalized tonic clonic seizure.  She was given versed and loaded w/ keppra.  Neurology was consulted and placed on cEEG.  Pt was successfully extubated 9/9.  Gap closed and transitioned off insulin gtt to lantus as of last BID.  MR of brain seizure protocol showed abnormal signal in the left parietotemporal lobe possible acute CVA vs post seizure focus. EEG was negative and taken off Keppra. Insulin was titrated down to lantus OD and ademlog TID with meals. MRI of the brain with contrast showed a lesion in the left temporoparietal lobe with no surrounding edema. Neurosurgery was consulted. Started on empiric keppra. CT C/A/P ordered to rule out metastatic disease. Tentative plan for OR resection by neurosurgery on 09/21    Assessment/Plan:    1. New lesions in the left frontoparietal region on MRI unclear if primary or secondary: Neurosurgery consulted  CT chest abdomen and pelvis reviewed with pt. 5 mm lung nodule inseparable from left major fissure.  PO keppra for prophylaxis  Tentative plan by neurosurgery for OR resection on 09/21    2. DKA with coma and severe metabolic acidosis on admission: Now resolved  BSL is controlled   Hbaic of 16  Will need insulin on discharge  Continue 25 units of Lantus in AM with ademlog 5 units TID with meals   Monitor BSL closely for adjustment  Diabetes education    2. Witnessed seizure: no events captured on vEEG  PO Keppra for seizure prophylaxis in the setting of new mass on MRI   No Driving until follow up with neurology as outpatient    3. . FREDRICK secondary to DKA on admission resolved    VTE- Lovenox subcut    Code Status; Full Code    Discharge disposition:  Remains acutely ill with further work up and management pending.   Ambulating independently.

## 2021-09-19 NOTE — PROGRESS NOTE ADULT - SUBJECTIVE AND OBJECTIVE BOX
INTERVAL HPI/ OVERNIGHT EVENTS:     Patient seen and examined at bedside. Chart reviewed. No acute overnight events. No current complaints.     Vital Signs Last 24 Hrs  T(C): 36.7 (19 Sep 2021 10:00), Max: 36.7 (19 Sep 2021 10:00)  T(F): 98 (19 Sep 2021 10:00), Max: 98 (19 Sep 2021 10:00)  HR: 70 (19 Sep 2021 10:00) (66 - 70)  BP: 110/66 (19 Sep 2021 10:00) (96/65 - 110/66)  BP(mean): --  RR: 16 (19 Sep 2021 10:00) (16 - 18)  SpO2: 96% (19 Sep 2021 10:00) (96% - 97%)    PHYSICAL EXAM:  GENERAL: NAD, AOX3  HEAD:  Atraumatic, Normocephalic  EYES: conjunctiva and sclera clear  ENMT: Moist mucous membranes  NECK: Supple, No JVD  NERVOUS SYSTEM:  Alert & Oriented X3, Motor Strength 5/5 B/L upper and lower extremities  CHEST/LUNG: Clear to auscultation bilaterally; No rales, rhonchi, wheezing, or rubs  HEART: Regular rate and rhythm; No murmurs, rubs, or gallops  ABDOMEN: Soft, Nontender, Nondistended; Bowel sounds present  EXTREMITIES:  2+ Peripheral Pulses, No clubbing, cyanosis, or edema    MEDICATIONS  (STANDING):  aspirin enteric coated 81 milliGRAM(s) Oral daily  benzocaine 15 mG/menthol 3.6 mG (Sugar-Free) Lozenge 1 Lozenge Oral four times a day  dextrose 40% Gel 15 Gram(s) Oral once  dextrose 50% Injectable 25 Gram(s) IV Push once  dextrose 50% Injectable 12.5 Gram(s) IV Push once  dextrose 50% Injectable 25 Gram(s) IV Push once  glucagon  Injectable 1 milliGRAM(s) IntraMuscular once  heparin   Injectable 5000 Unit(s) SubCutaneous every 8 hours  insulin glargine Injectable (LANTUS) 25 Unit(s) SubCutaneous every morning  insulin lispro (ADMELOG) corrective regimen sliding scale   SubCutaneous Before meals and at bedtime  insulin lispro Injectable (ADMELOG) 5 Unit(s) SubCutaneous three times a day before meals  levETIRAcetam 500 milliGRAM(s) Oral two times a day  melatonin 3 milliGRAM(s) Oral at bedtime  nystatin Powder 1 Application(s) Topical two times a day    MEDICATIONS  (PRN):  phenol 1.4% (CHLORASEPTIC) Oral Spray 1 Spray(s) Topical every 4 hours PRN sore throat      LABS:                          10.7   5.38  )-----------( 281      ( 19 Sep 2021 05:52 )             33.3     09-19    139  |  101  |  6.7<L>  ----------------------------<  140<H>  4.1   |  29.0  |  0.75    Ca    9.3      19 Sep 2021 05:52  Phos  3.6     09-19  Mg     1.8     09-19    TPro  5.4<L>  /  Alb  2.8<L>  /  TBili  0.3<L>  /  DBili  x   /  AST  28  /  ALT  35<H>  /  AlkPhos  51  09-19 09-17    142  |  104  |  9.4  ----------------------------<  126<H>  4.2   |  28.0  |  0.86    Ca    9.2      17 Sep 2021 09:29

## 2021-09-19 NOTE — PROGRESS NOTE ADULT - ASSESSMENT
53 yo female who was found to a left temporal parietal enhancing mass    Plan:    -Patient seen and case discussed with Dr. Lu attending on call  -Continue q4 hour neurochecks with protected sleep  -Plan for surgery on Tuesday  -Rpt Covid swab, PT/INR and type and screen ordered  -DVT ppx: SCD's and Sq Heparin   -DM: Endocrinology input appreciated   -Seizure ppx: Keppra  -MRI Head with fiducial tomorrow for surgical planning

## 2021-09-19 NOTE — PROGRESS NOTE ADULT - SUBJECTIVE AND OBJECTIVE BOX
HPI:  Pt is a 53 y/o female with reportedly no PMHx questionable "DKA attack" in the past per family was misdiagnosed presents to [facility] ED after being found unresponsive in pool of urine @ home yesterday. Pt reported last known normal in yesterday AM. On arrival by EMS, pt unresponsive, agonal, attempted intubation but was unsuccessful. BS in field >400. Brought to ED subsequently intubated. Labs revealing severe DKA w/ pH 6.9, serum co2 2, undetectable BS on f/s, 676 on BMP. Utox neg. CTH/chest unremarkable. ICU consulted.     Seen and examined in ED. Tachycardic, normotensive, saturating 100% on 50% fio2. On 60mcg propofol, 11u insulin.  (08 Sep 2021 01:22)      INTERVAL HPI/OVERNIGHT EVENTS:  56y Female seen sitting in chair bedside. Patient states that she is tolerating solids. Passing gas/BM. Voiding. Patient denies any headahes, weakness, numbness, n/v/d, fevers, chills, chest pain, SOB.     Vital Signs Last 24 Hrs  T(C): 36.7 (19 Sep 2021 16:00), Max: 36.7 (19 Sep 2021 10:00)  T(F): 98 (19 Sep 2021 16:00), Max: 98 (19 Sep 2021 10:00)  HR: 78 (19 Sep 2021 16:00) (66 - 78)  BP: 115/72 (19 Sep 2021 16:00) (96/65 - 115/72)  BP(mean): --  RR: 16 (19 Sep 2021 16:00) (16 - 18)  SpO2: 99% (19 Sep 2021 16:00) (96% - 99%)    PHYSICAL EXAM:  GENERAL: NAD, well-groomed, well-developed  HEAD:  Atraumatic, normocephalic  ZORAIDA COMA SCORE: E- V- M- =15       E: 4= opens eyes spontaneously       V: 5= oriented        M: 6= follows commands     MENTAL STATUS: AAO x3; Awake, Opens eyes spontaneously; Appropriately conversant without aphasia  CRANIAL NERVES: Visual fields full to confrontation, PERRL. EOMI without nystagmus. Facial sensation intact V1-3 distribution b/l. Face symmetric w/ normal eye closure and smile, tongue midline. Hearing grossly intact. Speech clear. Shoulder shrug intact.   MOTOR: strength 5/5 b/l upper and lower extremities  Uppers     Delt (C5/6)     Bicep (C5/6)     Wrist Extend (C6)     Tricep (C7)     HG (C8/T1)  R                     5/5                 5/5                         5/5                           5/5                   5/5  L                      5/5                 5/5                         5/5                           5/5                   5/5  Lowers      HF(L1/L2)     KE (L3)     DF (L4)     EHL (L5)     PF (S1)      R                     5/5              5/5           5/5           5/5            5/5  L                     5/5               5/5          5/5            5/5            5/5  SENSATION: grossly intact to light touch all extremities  COORDINATION: Gait intact; rapid alternating movements intact  CHEST/LUNG: Clear to auscultation bilaterally; no rales, rhonchi, wheezing, or rubs  HEART: +S1/+S2; Regular rate and rhythm; no murmurs, rubs, or gallops  ABDOMEN: Soft, nontender, nondistended; bowel sounds present all four quadrants  EXTREMITIES:  2+ peripheral pulses, no clubbing, cyanosis, or edema  SKIN: Warm, dry; no rashes or lesions    LABS:                        10.7   5.38  )-----------( 281      ( 19 Sep 2021 05:52 )             33.3     09-19    139  |  101  |  6.7<L>  ----------------------------<  140<H>  4.1   |  29.0  |  0.75    Ca    9.3      19 Sep 2021 05:52  Phos  3.6     09-19  Mg     1.8     09-19    TPro  5.4<L>  /  Alb  2.8<L>  /  TBili  0.3<L>  /  DBili  x   /  AST  28  /  ALT  35<H>  /  AlkPhos  51  09-19 09-19 @ 07:01  -  09-19 @ 16:04  --------------------------------------------------------  IN: 480 mL / OUT: 0 mL / NET: 480 mL        RADIOLOGY & ADDITIONAL TESTS:    CT Chest 9/17    IMPRESSION:  5 mm lung nodule inseparable from left major fissure. This was not seen on the prior study although atelectatic changes were present. Trace left pleural fluid.    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 This can be confirmed with pelvic ultrasound.    CT Abdomen and Pelvis 9/17    IMPRESSION:  5 mm lung nodule inseparable from left major fissure. This was not seen on the prior study although atelectatic changes were present. Trace left pleural fluid.    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 This can be confirmed with pelvic ultrasound.    CAPRINI SCORE [CLOT]:  Patient has an estimated Caprini score of greater than 5.  However, the patient's unique clinical situation will be addressed in an individual manner to determine appropriate anticoagulation treatment, if any. HPI:  Pt is a 53 y/o female with reportedly no PMHx questionable "DKA attack" in the past per family was misdiagnosed presents to [facility] ED after being found unresponsive in pool of urine @ home yesterday. Pt reported last known normal in yesterday AM. On arrival by EMS, pt unresponsive, agonal, attempted intubation but was unsuccessful. BS in field >400. Brought to ED subsequently intubated. Labs revealing severe DKA w/ pH 6.9, serum co2 2, undetectable BS on f/s, 676 on BMP. Utox neg. CTH/chest unremarkable. ICU consulted.     Seen and examined in ED. Tachycardic, normotensive, saturating 100% on 50% fio2. On 60mcg propofol, 11u insulin.  (08 Sep 2021 01:22)      INTERVAL HPI/OVERNIGHT EVENTS:  56y Female seen sitting in chair bedside. Patient states that she is tolerating solids. Passing gas/BM. Voiding. Patient denies any headahes, weakness, numbness, n/v/d, fevers, chills, chest pain, SOB.     Vital Signs Last 24 Hrs  T(C): 36.7 (19 Sep 2021 16:00), Max: 36.7 (19 Sep 2021 10:00)  T(F): 98 (19 Sep 2021 16:00), Max: 98 (19 Sep 2021 10:00)  HR: 78 (19 Sep 2021 16:00) (66 - 78)  BP: 115/72 (19 Sep 2021 16:00) (96/65 - 115/72)  BP(mean): --  RR: 16 (19 Sep 2021 16:00) (16 - 18)  SpO2: 99% (19 Sep 2021 16:00) (96% - 99%)    PHYSICAL EXAM:  GENERAL: NAD, well-groomed, well-developed  HEAD:  Atraumatic, normocephalic  ZORAIDA COMA SCORE: E- V- M- =15       E: 4= opens eyes spontaneously       V: 5= oriented        M: 6= follows commands     MENTAL STATUS: AAO x3; Awake, Opens eyes spontaneously; Appropriately conversant without aphasia  CRANIAL NERVES: Visual fields full to confrontation, PERRL. EOMI without nystagmus. Facial sensation intact V1-3 distribution b/l. Face symmetric w/ normal eye closure and smile, tongue midline. Hearing grossly intact. Speech clear. Shoulder shrug intact.   MOTOR: strength 5/5 b/l upper and lower extremities  SENSATION: grossly intact to light touch all extremities  COORDINATION: Rapid alternating movements intact  CHEST/LUNG: Clear to auscultation bilaterally  HEART: +S1/+S2;  ABDOMEN: Soft, nontender, nondistended  EXTREMITIES:  2+ peripheral pulses, no clubbing, cyanosis, or edema  SKIN: Warm, dry; no rashes or lesions    LABS:                        10.7   5.38  )-----------( 281      ( 19 Sep 2021 05:52 )             33.3     09-19    139  |  101  |  6.7<L>  ----------------------------<  140<H>  4.1   |  29.0  |  0.75    Ca    9.3      19 Sep 2021 05:52  Phos  3.6     09-19  Mg     1.8     09-19    TPro  5.4<L>  /  Alb  2.8<L>  /  TBili  0.3<L>  /  DBili  x   /  AST  28  /  ALT  35<H>  /  AlkPhos  51  09-19 09-19 @ 07:01  -  09-19 @ 16:04  --------------------------------------------------------  IN: 480 mL / OUT: 0 mL / NET: 480 mL      RADIOLOGY & ADDITIONAL TESTS:    CT Chest 9/17    IMPRESSION:  5 mm lung nodule inseparable from left major fissure. This was not seen on the prior study although atelectatic changes were present. Trace left pleural fluid.    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 This can be confirmed with pelvic ultrasound.    CT Abdomen and Pelvis 9/17    IMPRESSION:  5 mm lung nodule inseparable from left major fissure. This was not seen on the prior study although atelectatic changes were present. Trace left pleural fluid.    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 This can be confirmed with pelvic ultrasound.    MRI BRAIN 9/13/2021    IMPRESSION:    Abnormal signal in the left parietotemporal lobe most likely represents an acute infarction. There is no hemorrhagic conversion. There is no midline shift or herniation.    Additional differential diagnostic consideration is post seizure focus. Further correlation with MRI of the brain with contrast is suggested to exclude underlying lesion.      MRI Head 9/15/2021    IMPRESSION:    Corresponding to the abnormal signal seen in the previous MRI,    There is a peripherally enhancing mass with satellite smaller lesions as described, most likely represents primary versus metastatic disease however, the former is more likely secondary to absence of large vasogenic edema. Tissue sampling is suggested.    There is no substantial intracranial arterial stenosis or large vessel occlusion.    There is no significant arterial stenosis in the neck or dissection.        CAPRINI SCORE [CLOT]:  Patient has an estimated Caprini score of greater than 5.  However, the patient's unique clinical situation will be addressed in an individual manner to determine appropriate anticoagulation treatment, if any.

## 2021-09-19 NOTE — PROGRESS NOTE ADULT - SUBJECTIVE AND OBJECTIVE BOX
INTERVAL HPI/OVERNIGHT EVENTS:  management of diabetes.    MEDICATIONS  (STANDING):  aspirin enteric coated 81 milliGRAM(s) Oral daily  benzocaine 15 mG/menthol 3.6 mG (Sugar-Free) Lozenge 1 Lozenge Oral four times a day  chlorhexidine 2% Cloths 1 Application(s) Topical <User Schedule>  coronavirus (EUA) Vaccine ([employer]) 0.5 milliLiter(s) IntraMuscular once  dextrose 40% Gel 15 Gram(s) Oral once  dextrose 50% Injectable 25 Gram(s) IV Push once  dextrose 50% Injectable 12.5 Gram(s) IV Push once  dextrose 50% Injectable 25 Gram(s) IV Push once  glucagon  Injectable 1 milliGRAM(s) IntraMuscular once  heparin   Injectable 5000 Unit(s) SubCutaneous every 8 hours  insulin glargine Injectable (LANTUS) 20 Unit(s) SubCutaneous every morning  insulin glargine Injectable (LANTUS) 10 Unit(s) SubCutaneous at bedtime  insulin lispro (ADMELOG) corrective regimen sliding scale   SubCutaneous Before meals and at bedtime  insulin lispro Injectable (ADMELOG) 5 Unit(s) SubCutaneous three times a day before meals  nystatin Powder 1 Application(s) Topical two times a day    MEDICATIONS  (PRN):  phenol 1.4% (CHLORASEPTIC) Oral Spray 1 Spray(s) Topical every 4 hours PRN sore throat      Allergies    No Known Allergies      Review of systems:  some fatigue, no cp, no sob, no n/v    Vital Signs Last 24 Hrs  T(C): 37.1 (13 Sep 2021 04:47), Max: 37.1 (13 Sep 2021 04:47)  T(F): 98.7 (13 Sep 2021 04:47), Max: 98.7 (13 Sep 2021 04:47)  HR: 71 (13 Sep 2021 04:47) (71 - 76)  BP: 114/79 (13 Sep 2021 04:47) (114/79 - 114/83)  BP(mean): --  RR: 18 (13 Sep 2021 04:47) (18 - 18)  SpO2: 97% (13 Sep 2021 04:47) (97% - 97%)    PHYSICAL EXAM:  Constitutional: NAD, well-groomed, well-developed  Respiratory: CTAB  Cardiovascular: S1 and S2, RRR, no M/G/R  Gastrointestinal: BS+, soft, no organomegaly or mass  Extremities: No peripheral edema, no pedal lesions  Psychiatric: Normal mood, normal affect  Skin: No rashes, no acanthosis        LABS:    09-12    147<H>  |  112<H>  |  9.5  ----------------------------<  99  3.9   |  20.0<L>  |  0.98    Ca    9.0      12 Sep 2021 06:10

## 2021-09-20 ENCOUNTER — TRANSCRIPTION ENCOUNTER (OUTPATIENT)
Age: 56
End: 2021-09-20

## 2021-09-20 LAB
ALBUMIN SERPL ELPH-MCNC: 3.6 G/DL — SIGNIFICANT CHANGE UP (ref 3.3–5.2)
ALP SERPL-CCNC: 60 U/L — SIGNIFICANT CHANGE UP (ref 40–120)
ALT FLD-CCNC: 32 U/L — SIGNIFICANT CHANGE UP
ANION GAP SERPL CALC-SCNC: 10 MMOL/L — SIGNIFICANT CHANGE UP (ref 5–17)
AST SERPL-CCNC: 25 U/L — SIGNIFICANT CHANGE UP
BILIRUB SERPL-MCNC: 0.3 MG/DL — LOW (ref 0.4–2)
BLD GP AB SCN SERPL QL: SIGNIFICANT CHANGE UP
BUN SERPL-MCNC: 12 MG/DL — SIGNIFICANT CHANGE UP (ref 8–20)
CALCIUM SERPL-MCNC: 9.5 MG/DL — SIGNIFICANT CHANGE UP (ref 8.6–10.2)
CHLORIDE SERPL-SCNC: 98 MMOL/L — SIGNIFICANT CHANGE UP (ref 98–107)
CO2 SERPL-SCNC: 29 MMOL/L — SIGNIFICANT CHANGE UP (ref 22–29)
CREAT SERPL-MCNC: 0.93 MG/DL — SIGNIFICANT CHANGE UP (ref 0.5–1.3)
GLUCOSE BLDC GLUCOMTR-MCNC: 132 MG/DL — HIGH (ref 70–99)
GLUCOSE BLDC GLUCOMTR-MCNC: 134 MG/DL — HIGH (ref 70–99)
GLUCOSE BLDC GLUCOMTR-MCNC: 167 MG/DL — HIGH (ref 70–99)
GLUCOSE BLDC GLUCOMTR-MCNC: 73 MG/DL — SIGNIFICANT CHANGE UP (ref 70–99)
GLUCOSE SERPL-MCNC: 97 MG/DL — SIGNIFICANT CHANGE UP (ref 70–99)
HCT VFR BLD CALC: 36.5 % — SIGNIFICANT CHANGE UP (ref 34.5–45)
HGB BLD-MCNC: 11.4 G/DL — LOW (ref 11.5–15.5)
MCHC RBC-ENTMCNC: 30.6 PG — SIGNIFICANT CHANGE UP (ref 27–34)
MCHC RBC-ENTMCNC: 31.2 GM/DL — LOW (ref 32–36)
MCV RBC AUTO: 97.9 FL — SIGNIFICANT CHANGE UP (ref 80–100)
PLATELET # BLD AUTO: 351 K/UL — SIGNIFICANT CHANGE UP (ref 150–400)
POTASSIUM SERPL-MCNC: 4.3 MMOL/L — SIGNIFICANT CHANGE UP (ref 3.5–5.3)
POTASSIUM SERPL-SCNC: 4.3 MMOL/L — SIGNIFICANT CHANGE UP (ref 3.5–5.3)
PROT SERPL-MCNC: 6.5 G/DL — LOW (ref 6.6–8.7)
RBC # BLD: 3.73 M/UL — LOW (ref 3.8–5.2)
RBC # FLD: 15.7 % — HIGH (ref 10.3–14.5)
SARS-COV-2 RNA SPEC QL NAA+PROBE: SIGNIFICANT CHANGE UP
SARS-COV-2 RNA SPEC QL NAA+PROBE: SIGNIFICANT CHANGE UP
SODIUM SERPL-SCNC: 137 MMOL/L — SIGNIFICANT CHANGE UP (ref 135–145)
WBC # BLD: 7.02 K/UL — SIGNIFICANT CHANGE UP (ref 3.8–10.5)
WBC # FLD AUTO: 7.02 K/UL — SIGNIFICANT CHANGE UP (ref 3.8–10.5)

## 2021-09-20 PROCEDURE — 99232 SBSQ HOSP IP/OBS MODERATE 35: CPT | Mod: 57

## 2021-09-20 PROCEDURE — 99233 SBSQ HOSP IP/OBS HIGH 50: CPT

## 2021-09-20 PROCEDURE — 70553 MRI BRAIN STEM W/O & W/DYE: CPT | Mod: 26

## 2021-09-20 RX ORDER — INSULIN GLARGINE 100 [IU]/ML
18 INJECTION, SOLUTION SUBCUTANEOUS EVERY MORNING
Refills: 0 | Status: DISCONTINUED | OUTPATIENT
Start: 2021-09-20 | End: 2021-09-22

## 2021-09-20 RX ORDER — CEFAZOLIN SODIUM 1 G
2000 VIAL (EA) INJECTION ONCE
Refills: 0 | Status: DISCONTINUED | OUTPATIENT
Start: 2021-09-21 | End: 2021-09-23

## 2021-09-20 RX ORDER — AMINOLEVULINIC ACID HYDROCHLORIDE 1500 MG/1
2395 POWDER, FOR SOLUTION ORAL
Refills: 0 | Status: COMPLETED | OUTPATIENT
Start: 2021-09-21 | End: 2021-09-21

## 2021-09-20 RX ADMIN — Medication 5 UNIT(S): at 07:43

## 2021-09-20 RX ADMIN — Medication 2: at 12:09

## 2021-09-20 RX ADMIN — NYSTATIN CREAM 1 APPLICATION(S): 100000 CREAM TOPICAL at 17:11

## 2021-09-20 RX ADMIN — Medication 5 UNIT(S): at 17:17

## 2021-09-20 RX ADMIN — LEVETIRACETAM 500 MILLIGRAM(S): 250 TABLET, FILM COATED ORAL at 06:40

## 2021-09-20 RX ADMIN — HEPARIN SODIUM 5000 UNIT(S): 5000 INJECTION INTRAVENOUS; SUBCUTANEOUS at 11:57

## 2021-09-20 RX ADMIN — Medication 5 UNIT(S): at 11:58

## 2021-09-20 RX ADMIN — Medication 81 MILLIGRAM(S): at 11:52

## 2021-09-20 RX ADMIN — INSULIN GLARGINE 25 UNIT(S): 100 INJECTION, SOLUTION SUBCUTANEOUS at 08:57

## 2021-09-20 RX ADMIN — LEVETIRACETAM 500 MILLIGRAM(S): 250 TABLET, FILM COATED ORAL at 17:11

## 2021-09-20 NOTE — PROGRESS NOTE ADULT - ASSESSMENT
55 yo woman admitted with DKA and seizure found to have L temporoparietal mass on MRI brain w/contrast    L tempoparietal mass  C/w Keppra 500mg bid  Tentative resection tomorrow 9/21/21  Further management as per neurosurgery  Consider onc evaluation pending path    Thank you for allowing me to participate in this patient's care

## 2021-09-20 NOTE — PROGRESS NOTE ADULT - ASSESSMENT
56F s/p ?DKA exacerbation on admission, found to have L occipital brain lesion.         Plan  - Q4 neuro checks while in house; Please allow protected sleep from 11pm-4am  - HOB 30 degrees  - Keppra 500 BID  - Pain control PRN  - Normotensive  - NPO after midnight  - AM labs  - COVID PCR STAT   - Left crani for tumor resection tentatively scheduled for 9/21/21  - MRI Brain w/ fiducials today  - Explained risks, alternatives, complications and patient aware, agreeing.   - Medical management/ supportive care per primary team  - D/w Dr. Levy 56F s/p ?DKA exacerbation on admission, found to have L occipital brain lesion.         Plan  - Q4 neuro checks while in house; Please allow protected sleep from 11pm-4am  - HOB 30 degrees  - Keppra 500 BID  - Pain control PRN  - Normotensive  - NPO after midnight  - Hold all AC/AP  - AM labs  - COVID PCR STAT   - Left crani for tumor resection tentatively scheduled for 9/21/21  - MRI Brain w/ fiducials today  - Explained risks, alternatives, complications and patient aware, agreeing. Consented   - Medically optimized for surgery tomorrow  - D/w Dr. Levy

## 2021-09-20 NOTE — PROGRESS NOTE ADULT - ASSESSMENT
54 year old female with a past medical history of  prediabetes who  was BIBA after being found unresponsive at home.  Per records, patient was reported to be in a pool of urine when she was found.  EMS reported pt had agonal breathing and attempted to intubate her in the field but were unsuccessful  They also reported BG >400.  Patient was still agonal, unresponsive, hypoxic and hypercapnic on arrival to [facility] ED at which time she was intubated by ER.  Initial work up was consistent w/ DKA. Pt was admitted to MICU.  Labs were significant for pH 6.9, serum HCO3 2, undetectable glucose level on FS and serum BG of 676.  Utox and CTH were negative.   She was started on an insulin gtt, HCO3 gtt and aggressively hydrated w/ IVNS.  ICU course was complicated by a witnessed generalized tonic clonic seizure.  She was given versed and loaded w/ keppra.  Neurology was consulted and placed on cEEG.  Pt was successfully extubated 9/9.  Gap closed and transitioned off insulin gtt to lantus as of last BID.  MR of brain seizure protocol showed abnormal signal in the left parietotemporal lobe possible acute CVA vs post seizure focus. EEG was negative and taken off Keppra. Insulin was titrated down to lantus OD and ademlog TID with meals. MRI of the brain with contrast showed a lesion in the left temporoparietal lobe with no surrounding edema. Neurosurgery was consulted. Started on empiric keppra. CT C/A/P ordered to rule out metastatic disease. Tentative plan for OR resection by neurosurgery on 09/21    1. New lesions in the left frontoparietal region on MRI unclear if primary or secondary: Neurosurgery consulted  CT chest abdomen and pelvis reviewed with pt. 5 mm lung nodule inseparable from left major fissure.  PO keppra for prophylaxis  Tentative plan by neurosurgery for OR resection on 09/21    2. s/p DKA with coma and severe metabolic acidosis on admission: Now resolved  BSL is controlled   Hbaic of 16  Will need insulin on discharge  Continue 25 units of Lantus in AM with ademlog 5 units TID with meals   Monitor BSL closely for adjustment  Diabetes education  Endocrine following    for preop -   -decrease Lantus to 18 units in the morning  -hold admelog tomorrow as patient will be npo    for post op-  - recommend postop insulin gtt     2. Witnessed seizure: no events captured on vEEG  PO Keppra for seizure prophylaxis in the setting of new mass on MRI   No Driving until follow up with neurology as outpatient    3. s/p FREDRICK secondary to DKA on admission resolved    VTE- Lovenox SQ    Code Status; Full Code    Discharge disposition:  tbd  Plan- to OR in AM    Patient comorbidities well controlled. Medically optimized. No clear contraindication to undergo planned procedure.

## 2021-09-20 NOTE — PROGRESS NOTE ADULT - ASSESSMENT
-recommend postop insulin gtt   -decrease lantus tonight to 18 units  -hold admelog tomorrow as patient will be npo      discussed with bedside nurse 54F w/ PMH of prediabetes was BIBA after being found unresponsive at home. She had agonal breathing and she was brought to ER where she was intubated and found to have severe DKA, now resolved after standard treatment. Found also to have brain mass.   Consult for diabetes mgmt. A1c > 16%     T2DM- FS well controlled  -recommend postop insulin gtt   -continue lantus 18 units  -hold admelog as patient is npo    Brain mass- Likely primary brain tumor. going for resection today      discussed with bedside nurse 54F w/ PMH of prediabetes was BIBA after being found unresponsive at home. She had agonal breathing and she was brought to ER where she was intubated and found to have severe DKA, now resolved after standard treatment. Found also to have brain mass.   Consult for diabetes mgmt. A1c > 16%     T2DM- FS well controlled  -recommend postop insulin gtt   -continue lantus 18 units  -hold admelog as patient will be npo    Brain mass- Likely primary brain tumor. going for resection tomorrow      discussed with bedside nurse

## 2021-09-20 NOTE — PROGRESS NOTE ADULT - SUBJECTIVE AND OBJECTIVE BOX
Neurology Progress Note  [facility] at [address] [address]  [phone]    Interval History:  No acute overnight events. Tentative tumor resection 9/21/21    Physical Exam:  Vital Signs Last 24 Hrs  T(C): 36.4 (20 Sep 2021 11:15), Max: 36.7 (19 Sep 2021 16:00)  T(F): 97.6 (20 Sep 2021 11:15), Max: 98.1 (20 Sep 2021 05:59)  HR: 68 (20 Sep 2021 11:15) (60 - 78)  BP: 91/63 (20 Sep 2021 11:15) (91/63 - 115/72)  BP(mean): --  RR: 18 (20 Sep 2021 11:15) (16 - 18)  SpO2: 92% (20 Sep 2021 11:15) (92% - 99%)  General: no acute distress  HEENT:NC/AT  Lungs: no respiratory distress  Skin: no rash or ecchymoses  Lower extremities: bilateral LE edema    Mental Status: AAO x 3, no dysarthria, no aphasia  CN: PERRL, EOMI, VFF, V1-V3 sensation intact, no facial asymmetry  Motor:  5/5 x 4 extremities  Sensory: intact to light touch throughout  Coordination: no dysmetria on FTN  Gait: deferred    Imaging and labs:    LABS:  cret                        11.4   7.02  )-----------( 351      ( 20 Sep 2021 13:28 )             36.5     09-19    139  |  101  |  6.7<L>  ----------------------------<  140<H>  4.1   |  29.0  |  0.75    Ca    9.3      19 Sep 2021 05:52  Phos  3.6     09-19  Mg     1.8     09-19    TPro  5.4<L>  /  Alb  2.8<L>  /  TBili  0.3<L>  /  DBili  x   /  AST  28  /  ALT  35<H>  /  AlkPhos  51  09-19    MRI brain w/contrast - Corresponding to the FLAIR hyperintensity and diffuse diffusion restriction signal in the left temporoparietal region, there is a heterogeneous and peripherally enhancing lesion measuring 2.5 x 1.5 x 2.9 cm in AP, TRV and craniocaudal dimensions. Superoposterior to the lesion and best seen on series 5 image 16 series 3 image 18 there are small ring-enhancing lesions, largest of which measures 4 mm.  There is extension of the lesion close to the posterior horn of the left lateral ventricle without extension into the ependyma. No additional lesions are identified in the brain parenchyma.    The ventricles, cisterns and sulci are normal in size, shape and configuration. There is no hydrocephalus. There is no midline shift or herniation.    There is no extra-axial fluid collection or hydrocephalus.    The flow-voids of the major central arterial circulation at the skull base are normal, suggestive of of their patency.    The visualized globes are symmetric bilaterally. The paranasal sinuses and tympanomastoid air cells are clear.    There is a peripherally enhancing mass with satellite smaller lesions as described, most likely represents primary versus metastatic disease however, the former is more likely secondary to absence of large vasogenic edema. Tissue sampling is suggested.    cEEG 9/13-9/14 - normal

## 2021-09-20 NOTE — PROGRESS NOTE ADULT - SUBJECTIVE AND OBJECTIVE BOX
HEALTH ISSUES - PROBLEM Dx:    left frontoparietal region lesion    INTERVAL HPI/ OVERNIGHT EVENTS:    sitting in chair  comfortable  going back into history and trying to attribute current findings to other occurrences  explained the findings to the best of my knowledge  emotional support provided    REVIEW OF SYSTEMS:    as above    Vital Signs Last 24 Hrs  T(C): 36.4 (20 Sep 2021 11:15), Max: 36.7 (20 Sep 2021 05:59)  T(F): 97.6 (20 Sep 2021 11:15), Max: 98.1 (20 Sep 2021 05:59)  HR: 68 (20 Sep 2021 11:15) (60 - 68)  BP: 91/63 (20 Sep 2021 11:15) (91/63 - 96/61)  BP(mean): --  RR: 18 (20 Sep 2021 11:15) (16 - 18)  SpO2: 92% (20 Sep 2021 11:15) (92% - 97%)    PHYSICAL EXAM-  GENERAL: Obese, comfortable, sitting in chair  HEAD:  Atraumatic, Normocephalic  EYES: conjunctiva and sclera clear  ENT: Moist mucous membranes  NECK: Supple, No JVD  NERVOUS SYSTEM:  Alert & Oriented X 3, Motor Strength 5/5 B/L upper and lower extremities  CHEST/LUNG: Clear to auscultation bilaterally; No rales, rhonchi, wheezing, or rubs  HEART: Regular rate and rhythm; No murmurs, rubs, or gallops  ABDOMEN: Soft, Nontender, Nondistended; Bowel sounds present  EXTREMITIES:  2+ Peripheral Pulses, No clubbing, cyanosis, or edema    MEDICATIONS  (STANDING):  benzocaine 15 mG/menthol 3.6 mG (Sugar-Free) Lozenge 1 Lozenge Oral four times a day  dextrose 40% Gel 15 Gram(s) Oral once  dextrose 50% Injectable 25 Gram(s) IV Push once  dextrose 50% Injectable 12.5 Gram(s) IV Push once  dextrose 50% Injectable 25 Gram(s) IV Push once  glucagon  Injectable 1 milliGRAM(s) IntraMuscular once  insulin glargine Injectable (LANTUS) 18 Unit(s) SubCutaneous every morning  insulin lispro (ADMELOG) corrective regimen sliding scale   SubCutaneous Before meals and at bedtime  insulin lispro Injectable (ADMELOG) 5 Unit(s) SubCutaneous three times a day before meals  levETIRAcetam 500 milliGRAM(s) Oral two times a day  melatonin 3 milliGRAM(s) Oral at bedtime  nystatin Powder 1 Application(s) Topical two times a day    MEDICATIONS  (PRN):  phenol 1.4% (CHLORASEPTIC) Oral Spray 1 Spray(s) Topical every 4 hours PRN sore throat      LABS:                        11.4   7.02  )-----------( 351      ( 20 Sep 2021 13:28 )             36.5     09-20    137  |  98  |  12.0  ----------------------------<  97  4.3   |  29.0  |  0.93    Ca    9.5      20 Sep 2021 13:28  Phos  3.6     09-19  Mg     1.8     09-19    TPro  6.5<L>  /  Alb  3.6  /  TBili  0.3<L>  /  DBili  x   /  AST  25  /  ALT  32  /  AlkPhos  60  09-20

## 2021-09-20 NOTE — PROGRESS NOTE ADULT - SUBJECTIVE AND OBJECTIVE BOX
Interval Events:  no overnight events  follow up on diabetes    patient seen and examined at bedside.  npo tomorrow for surgery for brain mass resection  FS well controlled    REVIEW OF SYSTEMS:    CONSTITUTIONAL: No fever, weight loss, or fatigue  EYES: No eye pain, visual disturbances, or discharge  ENMT:  No difficulty hearing, tinnitus, vertigo; No sinus or throat pain  NECK: No pain or stiffness  RESPIRATORY: No cough, wheezing, chills or hemoptysis; No shortness of breath  CARDIOVASCULAR: No chest pain, palpitations, dizziness, or leg swelling  GASTROINTESTINAL: No abdominal or epigastric pain. No nausea, vomiting, or hematemesis; No diarrhea or constipation. No melena or hematochezia.  NEUROLOGICAL: No headaches, memory loss, loss of strength, numbness, or tremors  SKIN: No itching, burning, rashes, or lesions   MUSCULOSKELETAL: No joint pain or swelling; No muscle, back, or extremity pain  PSYCHIATRIC: No depression, anxiety, mood swings, or difficulty sleeping        No Known Allergies      MEDICATIONS  (STANDING):  aspirin enteric coated 81 milliGRAM(s) Oral daily  benzocaine 15 mG/menthol 3.6 mG (Sugar-Free) Lozenge 1 Lozenge Oral four times a day  dextrose 40% Gel 15 Gram(s) Oral once  dextrose 50% Injectable 25 Gram(s) IV Push once  dextrose 50% Injectable 12.5 Gram(s) IV Push once  dextrose 50% Injectable 25 Gram(s) IV Push once  glucagon  Injectable 1 milliGRAM(s) IntraMuscular once  heparin   Injectable 5000 Unit(s) SubCutaneous every 8 hours  insulin glargine Injectable (LANTUS) 18 Unit(s) SubCutaneous every morning  insulin lispro (ADMELOG) corrective regimen sliding scale   SubCutaneous Before meals and at bedtime  insulin lispro Injectable (ADMELOG) 5 Unit(s) SubCutaneous three times a day before meals  levETIRAcetam 500 milliGRAM(s) Oral two times a day  melatonin 3 milliGRAM(s) Oral at bedtime  nystatin Powder 1 Application(s) Topical two times a day    MEDICATIONS  (PRN):  phenol 1.4% (CHLORASEPTIC) Oral Spray 1 Spray(s) Topical every 4 hours PRN sore throat      Vital Signs Last 24 Hrs  T(C): 36.4 (20 Sep 2021 11:15), Max: 36.7 (20 Sep 2021 05:59)  T(F): 97.6 (20 Sep 2021 11:15), Max: 98.1 (20 Sep 2021 05:59)  HR: 68 (20 Sep 2021 11:15) (60 - 68)  BP: 91/63 (20 Sep 2021 11:15) (91/63 - 96/61)  BP(mean): --  RR: 18 (20 Sep 2021 11:15) (16 - 18)  SpO2: 92% (20 Sep 2021 11:15) (92% - 97%)  Weight (kg): 119.7 (09-20-21 @ 15:49)    Physical Exam:    Constitutional: NAD, well-developed  HEENT: EOMI, no exophalmos  Neck: trachea midline, no thyroid enlargement  Respiratory: CTAB, normal respirations  Cardiovascular: S1 and S2, RRR  Gastrointestinal: BS+, soft, ntnd  Neurological: AOx3, no focal deficits  Psychiatric: Normal mood and normal affect  Skin: no rashes, no acanthosis    LABS  09-20    137  |  98  |  12.0  ----------------------------<  97  4.3   |  29.0  |  0.93    Ca    9.5      20 Sep 2021 13:28  Phos  3.6     09-19  Mg     1.8     09-19    TPro  6.5<L>  /  Alb  3.6  /  TBili  0.3<L>  /  DBili  x   /  AST  25  /  ALT  32  /  AlkPhos  60  09-20                          11.4   7.02  )-----------( 351      ( 20 Sep 2021 13:28 )             36.5       A1C with Estimated Average Glucose Result: >16.9 % (09-09-21 @ 05:25)  Thyroid Stimulating Hormone, Serum: 1.26 uIU/mL (09-09-21 @ 05:23)  A1C with Estimated Average Glucose Result: >16.9 % (09-09-21 @ 03:34)  T4, Serum: 3.1 ug/dL (09-09-21 @ 03:34)  Thyroid Stimulating Hormone, Serum: 0.50 uIU/mL (09-08-21 @ 00:50)    Aspartate Aminotransferase (AST/SGOT): 25 U/L (09-20-21 @ 13:28)  Alkaline Phosphatase, Serum: 60 U/L (09-20-21 @ 13:28)  Alanine Aminotransferase (ALT/SGPT): 32 U/L (09-20-21 @ 13:28)  Albumin, Serum: 3.6 g/dL (09-20-21 @ 13:28)  Albumin, Serum: 2.8 g/dL (09-19-21 @ 05:52)  Aspartate Aminotransferase (AST/SGOT): 28 U/L (09-19-21 @ 05:52)  Alkaline Phosphatase, Serum: 51 U/L (09-19-21 @ 05:52)  Alanine Aminotransferase (ALT/SGPT): 35 U/L (09-19-21 @ 05:52)          CAPILLARY BLOOD GLUCOSE      POCT Blood Glucose.: 167 mg/dL (20 Sep 2021 10:52)  POCT Blood Glucose.: 132 mg/dL (20 Sep 2021 08:23)  POCT Blood Glucose.: 151 mg/dL (19 Sep 2021 21:13)  POCT Blood Glucose.: 152 mg/dL (19 Sep 2021 16:27)

## 2021-09-21 ENCOUNTER — APPOINTMENT (OUTPATIENT)
Dept: NEUROSURGERY | Facility: HOSPITAL | Age: 56
End: 2021-09-21

## 2021-09-21 ENCOUNTER — RESULT REVIEW (OUTPATIENT)
Age: 56
End: 2021-09-21

## 2021-09-21 LAB
ANION GAP SERPL CALC-SCNC: 11 MMOL/L — SIGNIFICANT CHANGE UP (ref 5–17)
APTT BLD: 31.7 SEC — SIGNIFICANT CHANGE UP (ref 27.5–35.5)
BUN SERPL-MCNC: 13.7 MG/DL — SIGNIFICANT CHANGE UP (ref 8–20)
CA-I BLD-SCNC: 1.23 MMOL/L — SIGNIFICANT CHANGE UP (ref 1.15–1.33)
CALCIUM SERPL-MCNC: 9.3 MG/DL — SIGNIFICANT CHANGE UP (ref 8.6–10.2)
CHLORIDE SERPL-SCNC: 99 MMOL/L — SIGNIFICANT CHANGE UP (ref 98–107)
CO2 SERPL-SCNC: 29 MMOL/L — SIGNIFICANT CHANGE UP (ref 22–29)
CREAT SERPL-MCNC: 0.79 MG/DL — SIGNIFICANT CHANGE UP (ref 0.5–1.3)
GAS PNL BLDA: SIGNIFICANT CHANGE UP
GLUCOSE BLDC GLUCOMTR-MCNC: 289 MG/DL — HIGH (ref 70–99)
GLUCOSE BLDC GLUCOMTR-MCNC: 99 MG/DL — SIGNIFICANT CHANGE UP (ref 70–99)
GLUCOSE SERPL-MCNC: 101 MG/DL — HIGH (ref 70–99)
HCG SERPL-ACNC: <4 MIU/ML — SIGNIFICANT CHANGE UP
HCT VFR BLD CALC: 34.7 % — SIGNIFICANT CHANGE UP (ref 34.5–45)
HGB BLD-MCNC: 11.1 G/DL — LOW (ref 11.5–15.5)
INR BLD: 1.01 RATIO — SIGNIFICANT CHANGE UP (ref 0.88–1.16)
MAGNESIUM SERPL-MCNC: 2 MG/DL — SIGNIFICANT CHANGE UP (ref 1.6–2.6)
MCHC RBC-ENTMCNC: 31 PG — SIGNIFICANT CHANGE UP (ref 27–34)
MCHC RBC-ENTMCNC: 32 GM/DL — SIGNIFICANT CHANGE UP (ref 32–36)
MCV RBC AUTO: 96.9 FL — SIGNIFICANT CHANGE UP (ref 80–100)
PHOSPHATE SERPL-MCNC: 4.2 MG/DL — SIGNIFICANT CHANGE UP (ref 2.4–4.7)
PLATELET # BLD AUTO: 301 K/UL — SIGNIFICANT CHANGE UP (ref 150–400)
POTASSIUM SERPL-MCNC: 4.3 MMOL/L — SIGNIFICANT CHANGE UP (ref 3.5–5.3)
POTASSIUM SERPL-SCNC: 4.3 MMOL/L — SIGNIFICANT CHANGE UP (ref 3.5–5.3)
PROTHROM AB SERPL-ACNC: 11.7 SEC — SIGNIFICANT CHANGE UP (ref 10.6–13.6)
RBC # BLD: 3.58 M/UL — LOW (ref 3.8–5.2)
RBC # FLD: 15.4 % — HIGH (ref 10.3–14.5)
SODIUM SERPL-SCNC: 139 MMOL/L — SIGNIFICANT CHANGE UP (ref 135–145)
WBC # BLD: 6.09 K/UL — SIGNIFICANT CHANGE UP (ref 3.8–10.5)
WBC # FLD AUTO: 6.09 K/UL — SIGNIFICANT CHANGE UP (ref 3.8–10.5)

## 2021-09-21 PROCEDURE — 88342 IMHCHEM/IMCYTCHM 1ST ANTB: CPT | Mod: 26

## 2021-09-21 PROCEDURE — 61781 SCAN PROC CRANIAL INTRA: CPT | Mod: 82

## 2021-09-21 PROCEDURE — 69990 MICROSURGERY ADD-ON: CPT | Mod: 59

## 2021-09-21 PROCEDURE — 69990 MICROSURGERY ADD-ON: CPT | Mod: 82,59

## 2021-09-21 PROCEDURE — 99232 SBSQ HOSP IP/OBS MODERATE 35: CPT

## 2021-09-21 PROCEDURE — 61781 SCAN PROC CRANIAL INTRA: CPT

## 2021-09-21 PROCEDURE — 88341 IMHCHEM/IMCYTCHM EA ADD ANTB: CPT | Mod: 26

## 2021-09-21 PROCEDURE — 61510 CRNEC TREPH EXC BRN TUM STTL: CPT

## 2021-09-21 PROCEDURE — 88331 PATH CONSLTJ SURG 1 BLK 1SPC: CPT | Mod: 26

## 2021-09-21 PROCEDURE — 99221 1ST HOSP IP/OBS SF/LOW 40: CPT

## 2021-09-21 PROCEDURE — 61510 CRNEC TREPH EXC BRN TUM STTL: CPT | Mod: 82

## 2021-09-21 PROCEDURE — 88307 TISSUE EXAM BY PATHOLOGIST: CPT | Mod: 26

## 2021-09-21 RX ORDER — DEXAMETHASONE 0.5 MG/5ML
4 ELIXIR ORAL EVERY 6 HOURS
Refills: 0 | Status: DISCONTINUED | OUTPATIENT
Start: 2021-09-21 | End: 2021-09-22

## 2021-09-21 RX ORDER — HYDROMORPHONE HYDROCHLORIDE 2 MG/ML
1 INJECTION INTRAMUSCULAR; INTRAVENOUS; SUBCUTANEOUS
Refills: 0 | Status: DISCONTINUED | OUTPATIENT
Start: 2021-09-21 | End: 2021-09-22

## 2021-09-21 RX ORDER — SODIUM CHLORIDE 9 MG/ML
1000 INJECTION INTRAMUSCULAR; INTRAVENOUS; SUBCUTANEOUS
Refills: 0 | Status: DISCONTINUED | OUTPATIENT
Start: 2021-09-21 | End: 2021-09-23

## 2021-09-21 RX ORDER — CEFAZOLIN SODIUM 1 G
1000 VIAL (EA) INJECTION EVERY 8 HOURS
Refills: 0 | Status: COMPLETED | OUTPATIENT
Start: 2021-09-21 | End: 2021-09-22

## 2021-09-21 RX ORDER — INSULIN LISPRO 100/ML
VIAL (ML) SUBCUTANEOUS EVERY 6 HOURS
Refills: 0 | Status: DISCONTINUED | OUTPATIENT
Start: 2021-09-21 | End: 2021-09-22

## 2021-09-21 RX ORDER — NICARDIPINE HYDROCHLORIDE 30 MG/1
5 CAPSULE, EXTENDED RELEASE ORAL
Qty: 40 | Refills: 0 | Status: DISCONTINUED | OUTPATIENT
Start: 2021-09-21 | End: 2021-09-22

## 2021-09-21 RX ORDER — OXYCODONE HYDROCHLORIDE 5 MG/1
10 TABLET ORAL EVERY 4 HOURS
Refills: 0 | Status: DISCONTINUED | OUTPATIENT
Start: 2021-09-21 | End: 2021-09-27

## 2021-09-21 RX ORDER — FAMOTIDINE 10 MG/ML
20 INJECTION INTRAVENOUS EVERY 12 HOURS
Refills: 0 | Status: DISCONTINUED | OUTPATIENT
Start: 2021-09-21 | End: 2021-09-27

## 2021-09-21 RX ORDER — OXYCODONE HYDROCHLORIDE 5 MG/1
5 TABLET ORAL EVERY 4 HOURS
Refills: 0 | Status: DISCONTINUED | OUTPATIENT
Start: 2021-09-21 | End: 2021-09-27

## 2021-09-21 RX ORDER — ACETAMINOPHEN 500 MG
1000 TABLET ORAL EVERY 6 HOURS
Refills: 0 | Status: COMPLETED | OUTPATIENT
Start: 2021-09-21 | End: 2021-09-21

## 2021-09-21 RX ADMIN — LEVETIRACETAM 500 MILLIGRAM(S): 250 TABLET, FILM COATED ORAL at 05:59

## 2021-09-21 RX ADMIN — Medication 100 MILLIGRAM(S): at 22:23

## 2021-09-21 RX ADMIN — Medication 1000 MILLIGRAM(S): at 21:18

## 2021-09-21 RX ADMIN — AMINOLEVULINIC ACID HYDROCHLORIDE 2395 MILLIGRAM(S): 1500 POWDER, FOR SOLUTION ORAL at 10:26

## 2021-09-21 RX ADMIN — HYDROMORPHONE HYDROCHLORIDE 1 MILLIGRAM(S): 2 INJECTION INTRAMUSCULAR; INTRAVENOUS; SUBCUTANEOUS at 20:37

## 2021-09-21 RX ADMIN — HYDROMORPHONE HYDROCHLORIDE 1 MILLIGRAM(S): 2 INJECTION INTRAMUSCULAR; INTRAVENOUS; SUBCUTANEOUS at 21:18

## 2021-09-21 RX ADMIN — NYSTATIN CREAM 1 APPLICATION(S): 100000 CREAM TOPICAL at 06:00

## 2021-09-21 RX ADMIN — SODIUM CHLORIDE 75 MILLILITER(S): 9 INJECTION INTRAMUSCULAR; INTRAVENOUS; SUBCUTANEOUS at 20:39

## 2021-09-21 RX ADMIN — NICARDIPINE HYDROCHLORIDE 25 MG/HR: 30 CAPSULE, EXTENDED RELEASE ORAL at 20:38

## 2021-09-21 RX ADMIN — Medication 400 MILLIGRAM(S): at 20:38

## 2021-09-21 NOTE — PROGRESS NOTE ADULT - ASSESSMENT
54F w/ PMH of prediabetes was BIBA after being found unresponsive at home. She had agonal breathing and she was brought to ER where she was intubated and found to have severe DKA, now resolved after standard treatment. Found also to have brain mass.   Consult for diabetes mgmt. A1c > 16%     T2DM- FS well controlled  -recommend postop insulin gtt   -continue lantus 18 units  -hold admelog as patient is npo    Brain mass- Likely primary brain tumor. going for resection today

## 2021-09-21 NOTE — PROGRESS NOTE ADULT - SUBJECTIVE AND OBJECTIVE BOX
Interval Events:  no overnight events  follow up on diabetes    patient seen and examined at bedside.  waiting for surgery  no complaints      REVIEW OF SYSTEMS:    CONSTITUTIONAL: No fever, weight loss, or fatigue  EYES: No eye pain, visual disturbances, or discharge  ENMT:  No difficulty hearing, tinnitus, vertigo; No sinus or throat pain  NECK: No pain or stiffness  RESPIRATORY: No cough, wheezing, chills or hemoptysis; No shortness of breath  CARDIOVASCULAR: No chest pain, palpitations, dizziness, or leg swelling  GASTROINTESTINAL: No abdominal or epigastric pain. No nausea, vomiting, or hematemesis; No diarrhea or constipation. No melena or hematochezia.  NEUROLOGICAL: No headaches, memory loss, loss of strength, numbness, or tremors  SKIN: No itching, burning, rashes, or lesions   MUSCULOSKELETAL: No joint pain or swelling; No muscle, back, or extremity pain  PSYCHIATRIC: No depression, anxiety, mood swings, or difficulty sleeping        No Known Allergies      MEDICATIONS  (STANDING):  benzocaine 15 mG/menthol 3.6 mG (Sugar-Free) Lozenge 1 Lozenge Oral four times a day  ceFAZolin   IVPB 2000 milliGRAM(s) IV Intermittent once  dextrose 40% Gel 15 Gram(s) Oral once  dextrose 50% Injectable 25 Gram(s) IV Push once  dextrose 50% Injectable 12.5 Gram(s) IV Push once  dextrose 50% Injectable 25 Gram(s) IV Push once  glucagon  Injectable 1 milliGRAM(s) IntraMuscular once  insulin glargine Injectable (LANTUS) 18 Unit(s) SubCutaneous every morning  insulin lispro (ADMELOG) corrective regimen sliding scale   SubCutaneous every 6 hours  levETIRAcetam 500 milliGRAM(s) Oral two times a day  melatonin 3 milliGRAM(s) Oral at bedtime  nystatin Powder 1 Application(s) Topical two times a day  sodium chloride 0.9%. 1000 milliLiter(s) (75 mL/Hr) IV Continuous <Continuous>    MEDICATIONS  (PRN):  phenol 1.4% (CHLORASEPTIC) Oral Spray 1 Spray(s) Topical every 4 hours PRN sore throat      Vital Signs Last 24 Hrs  T(C): 36.7 (21 Sep 2021 13:00), Max: 36.9 (21 Sep 2021 12:00)  T(F): 98 (21 Sep 2021 13:00), Max: 98.4 (21 Sep 2021 12:00)  HR: 72 (21 Sep 2021 14:00) (59 - 90)  BP: 101/57 (21 Sep 2021 14:00) (90/58 - 110/75)  BP(mean): 70 (21 Sep 2021 14:00) (67 - 86)  RR: 14 (21 Sep 2021 14:00) (10 - 19)  SpO2: 96% (21 Sep 2021 14:00) (94% - 100%)  Weight (kg): 119.7 (09-21-21 @ 07:53)    Physical Exam:    Constitutional: NAD, well-developed  Respiratory: CTAB, normal respirations  Cardiovascular: S1 and S2, RRR  Gastrointestinal: BS+, soft, ntnd  Neurological: AOx3, no focal deficits  Psychiatric: Normal mood and normal affect  Skin: no rashes, no acanthosis    LABS  09-21    139  |  99  |  13.7  ----------------------------<  101<H>  4.3   |  29.0  |  0.79    Ca    9.3      21 Sep 2021 04:21  Phos  4.2     09-21  Mg     2.0     09-21    TPro  6.5<L>  /  Alb  3.6  /  TBili  0.3<L>  /  DBili  x   /  AST  25  /  ALT  32  /  AlkPhos  60  09-20                          11.1   6.09  )-----------( 301      ( 21 Sep 2021 04:21 )             34.7       A1C with Estimated Average Glucose Result: >16.9 % (09-09-21 @ 05:25)  Thyroid Stimulating Hormone, Serum: 1.26 uIU/mL (09-09-21 @ 05:23)  A1C with Estimated Average Glucose Result: >16.9 % (09-09-21 @ 03:34)  T4, Serum: 3.1 ug/dL (09-09-21 @ 03:34)  Thyroid Stimulating Hormone, Serum: 0.50 uIU/mL (09-08-21 @ 00:50)    Aspartate Aminotransferase (AST/SGOT): 25 U/L (09-20-21 @ 13:28)  Alkaline Phosphatase, Serum: 60 U/L (09-20-21 @ 13:28)  Alanine Aminotransferase (ALT/SGPT): 32 U/L (09-20-21 @ 13:28)  Albumin, Serum: 3.6 g/dL (09-20-21 @ 13:28)          CAPILLARY BLOOD GLUCOSE      POCT Blood Glucose.: 99 mg/dL (21 Sep 2021 11:20)  POCT Blood Glucose.: 73 mg/dL (20 Sep 2021 23:22)  POCT Blood Glucose.: 134 mg/dL (20 Sep 2021 17:15)

## 2021-09-21 NOTE — PROGRESS NOTE ADULT - SUBJECTIVE AND OBJECTIVE BOX
54 year old female with a past medical history of  prediabetes who  was BIBA after being found unresponsive at home.  Per records, patient was reported to be in a pool of urine when she was found.  EMS reported pt had agonal breathing and attempted to intubate her in the field but were unsuccessful  They also reported BG >400.  Patient was still agonal, unresponsive, hypoxic and hypercapnic on arrival to [facility] ED at which time she was intubated by ER.  Initial work up was consistent w/ DKA. Pt was admitted to MICU.  Labs were significant for pH 6.9, serum HCO3 2, undetectable glucose level on FS and serum BG of 676.  Utox and CTH were negative.   She was started on an insulin gtt, HCO3 gtt and aggressively hydrated w/ IVNS.  ICU course was complicated by a witnessed generalized tonic clonic seizure.  She was given versed and loaded w/ keppra.  Neurology was consulted and placed on cEEG.  Pt was successfully extubated 9/9.  Gap closed and transitioned off insulin gtt to lantus as of last BID.  MR of brain seizure protocol showed abnormal signal in the left parietotemporal lobe possible acute CVA vs post seizure focus. EEG was negative and taken off Keppra. Insulin was titrated down to lantus OD and ademlog TID with meals. MRI of the brain with contrast showed a lesion in the left temporoparietal lobe with no surrounding edema. Neurosurgery was consulted. Started on empiric keppra. CT C/A/P ordered to rule out metastatic disease. Tentative plan for OR resection by neurosurgery on 09/21.    Pt transferred to NCCU dark room this am, received 5-ALA; awaiting elective cerebral mass resection.  VS, labs, imaging reviewed. EKG, TTE reviewed.  Medications reviewed.    Exam:  NAD, cooperative.  AOx4, speech fluent, comprehension intact, PER, EOMI, no visual deficit, motor - 5/5 x4, sensation intact to LT.  CTAB  S1S2 present  Abd soft, NT  No peripheral swelling

## 2021-09-21 NOTE — BRIEF OPERATIVE NOTE - NSICDXBRIEFPROCEDURE_GEN_ALL_CORE_FT
PROCEDURES:  Craniotomy for resection of tumor of left side of brain 21-Sep-2021 20:00:23  Erlinda Aguilera

## 2021-09-21 NOTE — PROGRESS NOTE ADULT - ASSESSMENT
55 yo F with L occipital mass, planned for OR with 5-ALA today, 9/21.  Initially admitted with DKA and witnessed sz.  DKA, resolved; poorly controlled DM with A1c >16. On Lantus/pre meal insulin now, well controlled.  Witnessed seizure on admission, on Keppra; no new episodes reported.    Plan:  neurochecks   PO keppra - cont   plan for postop CT  dark room for 48 hours  avoid acute porphyria trigger meds  as was on ASA - plan for 1 U of plts preop  maintain -150 for now, likely lower postop, will discuss with NSGy after OR  NPO for now; plan to start Insulin ggt postop  IVF while NPO  strict I/Os  SCDs, hold SQL ppx in periop period until cleared by NSGy

## 2021-09-21 NOTE — BRIEF OPERATIVE NOTE - ELECTIVE PROCEDURE
[facility] Transitions Initial Follow Up Call    Call within 2 business days of discharge: Yes    Patient: Shayy Bledsoe Patient : 1951   MRN: [id number]  Reason for Admission: PNA d/t COVID+, ARF with hypoxemia, SURAJ, DMII  Discharge Date: 21 RARS: Readmission Risk Score: 20      Last Discharge [address] [address]       Complaint Diagnosis Description Type Department Provider    21 Cough; Other Community acquired pneumonia of left lower lobe of lung . .. ED to Hosp-Admission (Discharged) (ADMITTED) [facility] 5N Kalina Yan MD; Dave Walker ... Spoke with: ALEA    Facility: [facility]    Attempted to reach patient via phone for initial post hospital transition call. VM left stating purpose of call along with my contact information requesting a return call.    Brigida Graves LPN [address] [place] [phone]    Care Transitions 24 Hour Call    Were you discharged with any Home Care or Post Acute Services: No  Do you have support at home?: Alone  Are you an active caregiver in your home?: No  Care Transitions Interventions         Follow Up  Future Appointments   Date Time Provider Maria Elena Vizcarra   2021 11:30 AM Ibeth Garcia MD [facility]       Lencho Smith LPN
No

## 2021-09-21 NOTE — PROGRESS NOTE ADULT - SUBJECTIVE AND OBJECTIVE BOX
HPI:  Pt is a 55 y/o female with reportedly no PMHx questionable "DKA attack" in the past per family was misdiagnosed presents to [facility] ED after being found unresponsive in pool of urine @ home yesterday. Pt reported last known normal in yesterday AM. On arrival by EMS, pt unresponsive, agonal, attempted intubation but was unsuccessful. BS in field >400. Brought to ED subsequently intubated. Labs revealing severe DKA w/ pH 6.9, serum co2 2, undetectable BS on f/s, 676 on BMP. Utox neg. CTH/chest unremarkable. ICU consulted.     Seen and examined in ED. Tachycardic, normotensive, saturating 100% on 50% fio2. On 60mcg propofol, 11u insulin.  (08 Sep 2021 01:22)      INTERVAL HPI/OVERNIGHT EVENTS:  Patient seen lying in bed with complaint of minor headache.     Vital Signs Last 24 Hrs  T(C): 36.8 (21 Sep 2021 07:53), Max: 36.8 (21 Sep 2021 00:02)  T(F): 98.2 (21 Sep 2021 07:53), Max: 98.2 (21 Sep 2021 00:02)  HR: 68 (21 Sep 2021 10:00) (59 - 90)  BP: 102/69 (21 Sep 2021 10:00) (95/61 - 110/75)  BP(mean): 80 (21 Sep 2021 10:00) (70 - 86)  RR: 13 (21 Sep 2021 10:00) (10 - 19)  SpO2: 99% (21 Sep 2021 10:00) (94% - 100%)    PHYSICAL EXAM:  GENERAL: NAD, well-groomed, well-developed  HEAD:  Atraumatic, normocephalic  ZORAIDA COMA SCORE: E4 V5 M-6=15  MENTAL STATUS: AAO x3; Awake, Opens eyes spontaneously. Appropriately conversant without aphasia, following simple commands.  CRANIAL NERVES: Visual acuity normal for age, visual fields full to confrontation, PERRL. EOMI without nystagmus. Facial sensation intact V1-3 distribution b/l. Face symmetric w/ normal eye closure and smile, tongue midline. Hearing grossly intact. Speech clear. Head turning and shoulder shrug intact.   REFLEXES: PERRL. Corneals intact b/l. Gag intact. Cough intact. Oculocephalic reflex intact (Doll's eye). Negative Gilliland's b/l. Negative clonus b/l  MOTOR: strength 5/5 b/l upper and lower extremities  SENSATION: grossly intact to light touch all extremities    LABS:                        11.1   6.09  )-----------( 301      ( 21 Sep 2021 04:21 )             34.7     09-21    139  |  99  |  13.7  ----------------------------<  101<H>  4.3   |  29.0  |  0.79    Ca    9.3      21 Sep 2021 04:21  Phos  4.2     09-21  Mg     2.0     09-21    TPro  6.5<L>  /  Alb  3.6  /  TBili  0.3<L>  /  DBili  x   /  AST  25  /  ALT  32  /  AlkPhos  60  09-20    PT/INR - ( 21 Sep 2021 04:21 )   PT: 11.7 sec;   INR: 1.01 ratio         PTT - ( 21 Sep 2021 04:21 )  PTT:31.7 sec      09-20 @ 07:01  -  09-21 @ 07:00  --------------------------------------------------------  IN: 300 mL / OUT: 750 mL / NET: -450 mL    09-21 @ 07:01 - 09-21 @ 11:42  --------------------------------------------------------  IN: 225 mL / OUT: 800 mL / NET: -575 mL        RADIOLOGY & ADDITIONAL TESTS:  < from: MR Brain Stereotactic w/wo IV Cont (09.20.21 @ 13:22) >    IMPRESSION:    Unchanged left parieto-occipital enhancing mass.    < end of copied text >        CAPRINI SCORE [CLOT]:  Patient has an estimated Caprini score of greater than 5.  However, the patient's unique clinical situation will be addressed in an individual manner to determine appropriate anticoagulation treatment, if any.

## 2021-09-21 NOTE — PROGRESS NOTE ADULT - ASSESSMENT
Assessment  57 y/o F presented to ED after being unresponsive and incontinent to urine at home, found to be in DKA with a GTC seizure. MRI revealed an incidental finding of a new L occipital brain mass.    Plan  - OR for tumor resection today 9/21  - keep in dark room at least 48 hrs. after 5-ala administration  - 1 U platelets before OR  - neuro checks  - Keppra 500 bid  - sbp   - care per NSICU team while on unit

## 2021-09-22 LAB
ANION GAP SERPL CALC-SCNC: 11 MMOL/L — SIGNIFICANT CHANGE UP (ref 5–17)
ANION GAP SERPL CALC-SCNC: 16 MMOL/L — SIGNIFICANT CHANGE UP (ref 5–17)
BUN SERPL-MCNC: 11.6 MG/DL — SIGNIFICANT CHANGE UP (ref 8–20)
BUN SERPL-MCNC: 11.8 MG/DL — SIGNIFICANT CHANGE UP (ref 8–20)
CALCIUM SERPL-MCNC: 8.3 MG/DL — LOW (ref 8.6–10.2)
CALCIUM SERPL-MCNC: 8.8 MG/DL — SIGNIFICANT CHANGE UP (ref 8.6–10.2)
CHLORIDE SERPL-SCNC: 103 MMOL/L — SIGNIFICANT CHANGE UP (ref 98–107)
CHLORIDE SERPL-SCNC: 108 MMOL/L — HIGH (ref 98–107)
CO2 SERPL-SCNC: 22 MMOL/L — SIGNIFICANT CHANGE UP (ref 22–29)
CO2 SERPL-SCNC: 23 MMOL/L — SIGNIFICANT CHANGE UP (ref 22–29)
CREAT SERPL-MCNC: 0.68 MG/DL — SIGNIFICANT CHANGE UP (ref 0.5–1.3)
CREAT SERPL-MCNC: 0.91 MG/DL — SIGNIFICANT CHANGE UP (ref 0.5–1.3)
GLUCOSE BLDC GLUCOMTR-MCNC: 115 MG/DL — HIGH (ref 70–99)
GLUCOSE BLDC GLUCOMTR-MCNC: 117 MG/DL — HIGH (ref 70–99)
GLUCOSE BLDC GLUCOMTR-MCNC: 118 MG/DL — HIGH (ref 70–99)
GLUCOSE BLDC GLUCOMTR-MCNC: 124 MG/DL — HIGH (ref 70–99)
GLUCOSE BLDC GLUCOMTR-MCNC: 124 MG/DL — HIGH (ref 70–99)
GLUCOSE BLDC GLUCOMTR-MCNC: 127 MG/DL — HIGH (ref 70–99)
GLUCOSE BLDC GLUCOMTR-MCNC: 128 MG/DL — HIGH (ref 70–99)
GLUCOSE BLDC GLUCOMTR-MCNC: 129 MG/DL — HIGH (ref 70–99)
GLUCOSE BLDC GLUCOMTR-MCNC: 130 MG/DL — HIGH (ref 70–99)
GLUCOSE BLDC GLUCOMTR-MCNC: 150 MG/DL — HIGH (ref 70–99)
GLUCOSE BLDC GLUCOMTR-MCNC: 186 MG/DL — HIGH (ref 70–99)
GLUCOSE BLDC GLUCOMTR-MCNC: 206 MG/DL — HIGH (ref 70–99)
GLUCOSE BLDC GLUCOMTR-MCNC: 235 MG/DL — HIGH (ref 70–99)
GLUCOSE BLDC GLUCOMTR-MCNC: 254 MG/DL — HIGH (ref 70–99)
GLUCOSE BLDC GLUCOMTR-MCNC: 297 MG/DL — HIGH (ref 70–99)
GLUCOSE BLDC GLUCOMTR-MCNC: 309 MG/DL — HIGH (ref 70–99)
GLUCOSE BLDC GLUCOMTR-MCNC: 320 MG/DL — HIGH (ref 70–99)
GLUCOSE BLDC GLUCOMTR-MCNC: 338 MG/DL — HIGH (ref 70–99)
GLUCOSE BLDC GLUCOMTR-MCNC: 87 MG/DL — SIGNIFICANT CHANGE UP (ref 70–99)
GLUCOSE BLDC GLUCOMTR-MCNC: 95 MG/DL — SIGNIFICANT CHANGE UP (ref 70–99)
GLUCOSE SERPL-MCNC: 123 MG/DL — HIGH (ref 70–99)
GLUCOSE SERPL-MCNC: 325 MG/DL — HIGH (ref 70–99)
HCT VFR BLD CALC: 31.7 % — LOW (ref 34.5–45)
HGB BLD-MCNC: 10.2 G/DL — LOW (ref 11.5–15.5)
MAGNESIUM SERPL-MCNC: 1.8 MG/DL — SIGNIFICANT CHANGE UP (ref 1.6–2.6)
MAGNESIUM SERPL-MCNC: 2.1 MG/DL — SIGNIFICANT CHANGE UP (ref 1.6–2.6)
MCHC RBC-ENTMCNC: 31.5 PG — SIGNIFICANT CHANGE UP (ref 27–34)
MCHC RBC-ENTMCNC: 32.2 GM/DL — SIGNIFICANT CHANGE UP (ref 32–36)
MCV RBC AUTO: 97.8 FL — SIGNIFICANT CHANGE UP (ref 80–100)
PHOSPHATE SERPL-MCNC: 3.1 MG/DL — SIGNIFICANT CHANGE UP (ref 2.4–4.7)
PHOSPHATE SERPL-MCNC: 3.5 MG/DL — SIGNIFICANT CHANGE UP (ref 2.4–4.7)
PLATELET # BLD AUTO: 370 K/UL — SIGNIFICANT CHANGE UP (ref 150–400)
POTASSIUM SERPL-MCNC: 4.5 MMOL/L — SIGNIFICANT CHANGE UP (ref 3.5–5.3)
POTASSIUM SERPL-MCNC: 4.9 MMOL/L — SIGNIFICANT CHANGE UP (ref 3.5–5.3)
POTASSIUM SERPL-SCNC: 4.5 MMOL/L — SIGNIFICANT CHANGE UP (ref 3.5–5.3)
POTASSIUM SERPL-SCNC: 4.9 MMOL/L — SIGNIFICANT CHANGE UP (ref 3.5–5.3)
RBC # BLD: 3.24 M/UL — LOW (ref 3.8–5.2)
RBC # FLD: 15.7 % — HIGH (ref 10.3–14.5)
SODIUM SERPL-SCNC: 141 MMOL/L — SIGNIFICANT CHANGE UP (ref 135–145)
SODIUM SERPL-SCNC: 142 MMOL/L — SIGNIFICANT CHANGE UP (ref 135–145)
WBC # BLD: 8.9 K/UL — SIGNIFICANT CHANGE UP (ref 3.8–10.5)
WBC # FLD AUTO: 8.9 K/UL — SIGNIFICANT CHANGE UP (ref 3.8–10.5)

## 2021-09-22 PROCEDURE — 70450 CT HEAD/BRAIN W/O DYE: CPT | Mod: 26

## 2021-09-22 PROCEDURE — 99232 SBSQ HOSP IP/OBS MODERATE 35: CPT

## 2021-09-22 PROCEDURE — 99233 SBSQ HOSP IP/OBS HIGH 50: CPT

## 2021-09-22 RX ORDER — HYDROMORPHONE HYDROCHLORIDE 2 MG/ML
0.5 INJECTION INTRAMUSCULAR; INTRAVENOUS; SUBCUTANEOUS EVERY 4 HOURS
Refills: 0 | Status: DISCONTINUED | OUTPATIENT
Start: 2021-09-22 | End: 2021-09-27

## 2021-09-22 RX ORDER — DEXAMETHASONE 0.5 MG/5ML
4 ELIXIR ORAL EVERY 8 HOURS
Refills: 0 | Status: COMPLETED | OUTPATIENT
Start: 2021-09-23 | End: 2021-09-24

## 2021-09-22 RX ORDER — BENZOCAINE AND MENTHOL 5; 1 G/100ML; G/100ML
1 LIQUID ORAL
Refills: 0 | Status: DISCONTINUED | OUTPATIENT
Start: 2021-09-22 | End: 2021-09-27

## 2021-09-22 RX ORDER — POLYETHYLENE GLYCOL 3350 17 G/17G
17 POWDER, FOR SOLUTION ORAL DAILY
Refills: 0 | Status: DISCONTINUED | OUTPATIENT
Start: 2021-09-22 | End: 2021-09-27

## 2021-09-22 RX ORDER — HYDRALAZINE HCL 50 MG
10 TABLET ORAL
Refills: 0 | Status: DISCONTINUED | OUTPATIENT
Start: 2021-09-22 | End: 2021-09-27

## 2021-09-22 RX ORDER — DEXAMETHASONE 0.5 MG/5ML
ELIXIR ORAL
Refills: 0 | Status: COMPLETED | OUTPATIENT
Start: 2021-09-22 | End: 2021-09-27

## 2021-09-22 RX ORDER — DEXAMETHASONE 0.5 MG/5ML
2 ELIXIR ORAL EVERY 8 HOURS
Refills: 0 | Status: COMPLETED | OUTPATIENT
Start: 2021-09-24 | End: 2021-09-25

## 2021-09-22 RX ORDER — DEXAMETHASONE 0.5 MG/5ML
2 ELIXIR ORAL DAILY
Refills: 0 | Status: COMPLETED | OUTPATIENT
Start: 2021-09-26 | End: 2021-09-27

## 2021-09-22 RX ORDER — SODIUM CHLORIDE 9 MG/ML
1000 INJECTION INTRAMUSCULAR; INTRAVENOUS; SUBCUTANEOUS ONCE
Refills: 0 | Status: COMPLETED | OUTPATIENT
Start: 2021-09-22 | End: 2021-09-22

## 2021-09-22 RX ORDER — MAGNESIUM SULFATE 500 MG/ML
2 VIAL (ML) INJECTION ONCE
Refills: 0 | Status: COMPLETED | OUTPATIENT
Start: 2021-09-22 | End: 2021-09-22

## 2021-09-22 RX ORDER — INSULIN HUMAN 100 [IU]/ML
3 INJECTION, SOLUTION SUBCUTANEOUS
Qty: 100 | Refills: 0 | Status: DISCONTINUED | OUTPATIENT
Start: 2021-09-22 | End: 2021-09-23

## 2021-09-22 RX ORDER — INSULIN GLARGINE 100 [IU]/ML
30 INJECTION, SOLUTION SUBCUTANEOUS AT BEDTIME
Refills: 0 | Status: DISCONTINUED | OUTPATIENT
Start: 2021-09-22 | End: 2021-09-26

## 2021-09-22 RX ORDER — INSULIN LISPRO 100/ML
10 VIAL (ML) SUBCUTANEOUS
Refills: 0 | Status: DISCONTINUED | OUTPATIENT
Start: 2021-09-22 | End: 2021-09-26

## 2021-09-22 RX ORDER — NICARDIPINE HYDROCHLORIDE 30 MG/1
5 CAPSULE, EXTENDED RELEASE ORAL
Qty: 40 | Refills: 0 | Status: DISCONTINUED | OUTPATIENT
Start: 2021-09-22 | End: 2021-09-22

## 2021-09-22 RX ORDER — DEXAMETHASONE 0.5 MG/5ML
4 ELIXIR ORAL EVERY 6 HOURS
Refills: 0 | Status: COMPLETED | OUTPATIENT
Start: 2021-09-22 | End: 2021-09-23

## 2021-09-22 RX ORDER — SENNA PLUS 8.6 MG/1
2 TABLET ORAL AT BEDTIME
Refills: 0 | Status: DISCONTINUED | OUTPATIENT
Start: 2021-09-22 | End: 2021-09-27

## 2021-09-22 RX ORDER — DEXAMETHASONE 0.5 MG/5ML
2 ELIXIR ORAL EVERY 12 HOURS
Refills: 0 | Status: COMPLETED | OUTPATIENT
Start: 2021-09-25 | End: 2021-09-26

## 2021-09-22 RX ORDER — ACETAMINOPHEN 500 MG
650 TABLET ORAL EVERY 6 HOURS
Refills: 0 | Status: DISCONTINUED | OUTPATIENT
Start: 2021-09-22 | End: 2021-09-27

## 2021-09-22 RX ORDER — LABETALOL HCL 100 MG
10 TABLET ORAL
Refills: 0 | Status: DISCONTINUED | OUTPATIENT
Start: 2021-09-22 | End: 2021-09-23

## 2021-09-22 RX ORDER — INSULIN LISPRO 100/ML
VIAL (ML) SUBCUTANEOUS
Refills: 0 | Status: DISCONTINUED | OUTPATIENT
Start: 2021-09-22 | End: 2021-09-27

## 2021-09-22 RX ADMIN — Medication 100 MILLIGRAM(S): at 21:02

## 2021-09-22 RX ADMIN — SENNA PLUS 2 TABLET(S): 8.6 TABLET ORAL at 21:02

## 2021-09-22 RX ADMIN — NICARDIPINE HYDROCHLORIDE 25 MG/HR: 30 CAPSULE, EXTENDED RELEASE ORAL at 10:00

## 2021-09-22 RX ADMIN — HYDROMORPHONE HYDROCHLORIDE 1 MILLIGRAM(S): 2 INJECTION INTRAMUSCULAR; INTRAVENOUS; SUBCUTANEOUS at 10:15

## 2021-09-22 RX ADMIN — HYDROMORPHONE HYDROCHLORIDE 0.5 MILLIGRAM(S): 2 INJECTION INTRAMUSCULAR; INTRAVENOUS; SUBCUTANEOUS at 22:00

## 2021-09-22 RX ADMIN — Medication 100 MILLIGRAM(S): at 06:12

## 2021-09-22 RX ADMIN — LEVETIRACETAM 500 MILLIGRAM(S): 250 TABLET, FILM COATED ORAL at 17:37

## 2021-09-22 RX ADMIN — Medication 3 MILLIGRAM(S): at 21:02

## 2021-09-22 RX ADMIN — Medication 10 UNIT(S): at 17:41

## 2021-09-22 RX ADMIN — POLYETHYLENE GLYCOL 3350 17 GRAM(S): 17 POWDER, FOR SOLUTION ORAL at 13:19

## 2021-09-22 RX ADMIN — FAMOTIDINE 20 MILLIGRAM(S): 10 INJECTION INTRAVENOUS at 17:37

## 2021-09-22 RX ADMIN — INSULIN HUMAN 3 UNIT(S)/HR: 100 INJECTION, SOLUTION SUBCUTANEOUS at 01:34

## 2021-09-22 RX ADMIN — Medication 100 MILLIGRAM(S): at 13:18

## 2021-09-22 RX ADMIN — HYDROMORPHONE HYDROCHLORIDE 1 MILLIGRAM(S): 2 INJECTION INTRAMUSCULAR; INTRAVENOUS; SUBCUTANEOUS at 01:25

## 2021-09-22 RX ADMIN — Medication 4 MILLIGRAM(S): at 13:19

## 2021-09-22 RX ADMIN — OXYCODONE HYDROCHLORIDE 5 MILLIGRAM(S): 5 TABLET ORAL at 08:13

## 2021-09-22 RX ADMIN — Medication 4 MILLIGRAM(S): at 01:25

## 2021-09-22 RX ADMIN — INSULIN GLARGINE 30 UNIT(S): 100 INJECTION, SOLUTION SUBCUTANEOUS at 21:01

## 2021-09-22 RX ADMIN — NICARDIPINE HYDROCHLORIDE 25 MG/HR: 30 CAPSULE, EXTENDED RELEASE ORAL at 03:41

## 2021-09-22 RX ADMIN — SODIUM CHLORIDE 75 MILLILITER(S): 9 INJECTION INTRAMUSCULAR; INTRAVENOUS; SUBCUTANEOUS at 10:00

## 2021-09-22 RX ADMIN — HYDROMORPHONE HYDROCHLORIDE 1 MILLIGRAM(S): 2 INJECTION INTRAMUSCULAR; INTRAVENOUS; SUBCUTANEOUS at 10:00

## 2021-09-22 RX ADMIN — Medication 4 MILLIGRAM(S): at 06:12

## 2021-09-22 RX ADMIN — OXYCODONE HYDROCHLORIDE 10 MILLIGRAM(S): 5 TABLET ORAL at 14:33

## 2021-09-22 RX ADMIN — LEVETIRACETAM 500 MILLIGRAM(S): 250 TABLET, FILM COATED ORAL at 06:12

## 2021-09-22 RX ADMIN — SODIUM CHLORIDE 1000 MILLILITER(S): 9 INJECTION INTRAMUSCULAR; INTRAVENOUS; SUBCUTANEOUS at 11:00

## 2021-09-22 RX ADMIN — HYDROMORPHONE HYDROCHLORIDE 0.5 MILLIGRAM(S): 2 INJECTION INTRAMUSCULAR; INTRAVENOUS; SUBCUTANEOUS at 21:02

## 2021-09-22 RX ADMIN — Medication 4 MILLIGRAM(S): at 17:37

## 2021-09-22 RX ADMIN — NYSTATIN CREAM 1 APPLICATION(S): 100000 CREAM TOPICAL at 06:12

## 2021-09-22 RX ADMIN — Medication 50 GRAM(S): at 06:12

## 2021-09-22 RX ADMIN — OXYCODONE HYDROCHLORIDE 5 MILLIGRAM(S): 5 TABLET ORAL at 08:45

## 2021-09-22 RX ADMIN — NYSTATIN CREAM 1 APPLICATION(S): 100000 CREAM TOPICAL at 17:37

## 2021-09-22 RX ADMIN — HYDROMORPHONE HYDROCHLORIDE 1 MILLIGRAM(S): 2 INJECTION INTRAMUSCULAR; INTRAVENOUS; SUBCUTANEOUS at 02:00

## 2021-09-22 RX ADMIN — OXYCODONE HYDROCHLORIDE 10 MILLIGRAM(S): 5 TABLET ORAL at 15:00

## 2021-09-22 NOTE — PHYSICAL THERAPY INITIAL EVALUATION ADULT - TRANSFER SAFETY CONCERNS NOTED: SIT/STAND, REHAB EVAL
in sagittal plane/decreased sequencing ability/decreased weight-shifting ability in sagittal plane/decreased weight-shifting ability

## 2021-09-22 NOTE — PHYSICAL THERAPY INITIAL EVALUATION ADULT - ADDITIONAL COMMENTS
as per pt: resides in the private house 2-4 steps to enter (+) rail, about 6 steps (+) rail to the second floor,  (-) DME, denies Hx of falls, Family available to assist as needed upon D/C home
as per pt: resides in the private house 2-4 steps to enter (+) rail, (-) DME, denies Hx of falls, Family available to assist as needed upon D/C home

## 2021-09-22 NOTE — OCCUPATIONAL THERAPY INITIAL EVALUATION ADULT - PRECAUTIONS/LIMITATIONS, REHAB EVAL
dark room/no lights/aspiration precautions/fall precautions/oxygen therapy device and L/min/surgical precautions

## 2021-09-22 NOTE — PHYSICAL THERAPY INITIAL EVALUATION ADULT - PERTINENT HX OF CURRENT PROBLEM, REHAB EVAL
pt was  found unresponsive at home, (+) (+) DKA w/ coma, seizures and severe metabolic acidosis.
pt was  found unresponsive at home

## 2021-09-22 NOTE — PROGRESS NOTE ADULT - ASSESSMENT
55 yo F with L occipital mass, s/p resection with 5-ALA 9/21.  R homonymous hemianopia. Lightheadedness likely due to decreased PO + need for lower BP + opiates.  Initially admitted with DKA and witnessed sz.  DKA, resolved; poorly controlled DM with A1c >16. On Lantus/pre meal insulin now, well controlled.  Witnessed seizure on admission, on Keppra; no new episodes reported.  Mild polyuria.    Plan:  neurochecks   PO keppra - cont   dark room for 48 hours in total; avoid acute porphyria trigger meds  maintain -130, wean off Cardene  dysphagia screen, diet as tolerated   cont Insulin ggt postop till full PO intake; then switch to LAntus/premeal regimen with high ISS as on steroids, adjust as needed  IVF while NPO, 1L bolus as negative balance  TOV, strict I/Os  SCDs, hold SQL ppx till am as <24 hrs postop  OOB, PT Home

## 2021-09-22 NOTE — PHYSICAL THERAPY INITIAL EVALUATION ADULT - PRECAUTIONS/LIMITATIONS, REHAB EVAL
aspiration precautions/cardiac precautions/fall precautions/seizure precautions
(+) Jordi Rucker, dark room until 9/23/aspiration precautions/cardiac precautions/fall precautions/seizure precautions/surgical precautions

## 2021-09-22 NOTE — PHYSICAL THERAPY INITIAL EVALUATION ADULT - IMPAIRMENTS CONTRIBUTING TO GAIT DEVIATIONS, PT EVAL
impaired balance/decreased flexibility/decreased strength
impaired balance/decreased flexibility/decreased strength

## 2021-09-22 NOTE — PHYSICAL THERAPY INITIAL EVALUATION ADULT - PHYSICAL ASSIST/NONPHYSICAL ASSIST: STAND/SIT, REHAB EVAL
verbal cues/nonverbal cues (demo/gestures)/1 person + 1 person to manage equipment verbal cues/nonverbal cues (demo/gestures)/2 person assist

## 2021-09-22 NOTE — PHYSICAL THERAPY INITIAL EVALUATION ADULT - REFERRAL TO ANOTHER SERVICE NEEDED, PT EVAL
occupational therapy/social work/speech language pathology
occupational therapy/social work/speech language pathology

## 2021-09-22 NOTE — PHYSICAL THERAPY INITIAL EVALUATION ADULT - GENERAL OBSERVATIONS, REHAB EVAL
Pt received in the semi-De La O position, (+) EEG, NAD
Pt received in the semi-De La O position, (+) dark room as per order, NAD.

## 2021-09-22 NOTE — PHYSICAL THERAPY INITIAL EVALUATION ADULT - PERSONAL SAFETY AND JUDGMENT, REHAB EVAL
educated on benefits coming from [facility], further education recommended
educated on benefits coming from [facility], further education recommended

## 2021-09-22 NOTE — PHYSICAL THERAPY INITIAL EVALUATION ADULT - DIAGNOSIS, PT EVAL
Impaired Functional Mobility due to generalized weakness, (+) left parieto-occipital mass with. Impaired Functional Mobility due to generalized weakness, (+) left parieto-occipital mass.

## 2021-09-22 NOTE — OCCUPATIONAL THERAPY INITIAL EVALUATION ADULT - GENERAL OBSERVATIONS, REHAB EVAL
pt received in bed and lef tas found, c/o HA 8/0 and dizziness worsening with sitting edge of bed, +J-odell drain, montgomery, bi pap, +Tele and + pt received in bed and left as found, c/o HA 8/0 and dizziness worsening with sitting edge of bed, +J-odell drain, montgomery, bi pap, +Tele and +

## 2021-09-22 NOTE — PHYSICAL THERAPY INITIAL EVALUATION ADULT - IMPAIRMENTS FOUND, PT EVAL
aerobic capacity/endurance/muscle strength/neuromotor development and sensory integration
aerobic capacity/endurance/muscle strength/neuromotor development and sensory integration

## 2021-09-22 NOTE — PROGRESS NOTE ADULT - ASSESSMENT
Assessment:  53 y/o female with reportedly no PMHx questionable "DKA attack" in the past per family was misdiagnosed presents to [facility] ED after being found unresponsive in pool of urine @ home yesterday. Pt reported last known normal in yesterday AM. On arrival by EMS, pt unresponsive, agonal, attempted intubation but was unsuccessful. BS in field >400. Brought to ED subsequently intubated. Labs revealing severe DKA w/ pH 6.9, serum co2 2, undetectable BS on f/s, 676 on BMP. Utox neg. CTH/chest unremarkable. ICU consulted.   Pt s/p Left SOC for tumor rxn POD#1      Plan  	Q1 hour Neuro checks, Q1 hour Vitals  	HOB 30 degrees, Neck midline position  	Monitor drain output              Keep in the dark room x48hrs              CT will have rpt as per neurosurgery              Cont decadron taper              Cont Keppra for Sz prophylaxis              Pain management with tylenol, oxy, dilaudid  	Keep BP , nicardipine prn              Advance diet as tolerated              MRI of the brain when stable              PT/OT after 48hrs                 Plan as per neuro ICU

## 2021-09-22 NOTE — PHYSICAL THERAPY INITIAL EVALUATION ADULT - ACTIVE RANGE OF MOTION EXAMINATION, REHAB EVAL
assessed during functional mobility, resistance not applied/bilateral upper extremity Active ROM was WFL (within functional limits)/bilateral  lower extremity Active ROM was WFL (within functional limits)
assessed during functional mobility, resistance not applied/bilateral upper extremity Active ROM was WFL (within functional limits)/bilateral  lower extremity Active ROM was WFL (within functional limits)

## 2021-09-22 NOTE — PHYSICAL THERAPY INITIAL EVALUATION ADULT - LEVEL OF INDEPENDENCE: SIT/STAND, REHAB EVAL
with left UE support on the bed frame/moderate assist (50% patients effort) with left UE support on the bed frame/minimum assist (75% patients effort)

## 2021-09-22 NOTE — PROGRESS NOTE ADULT - SUBJECTIVE AND OBJECTIVE BOX
HPI: Pt is a 53 y/o female with reportedly no PMHx questionable "DKA attack" in the past per family was misdiagnosed presents to [facility] ED after being found unresponsive in pool of urine @ home yesterday. Pt reported last known normal in yesterday AM. On arrival by EMS, pt unresponsive, agonal, attempted intubation but was unsuccessful. BS in field >400. Brought to ED subsequently intubated. Labs revealing severe DKA w/ pH 6.9, serum co2 2, undetectable BS on f/s, 676 on BMP. Utox neg. CTH/chest unremarkable. ICU consulted.     24hr events: Patient upgraded to neuro ICU for 5 ALA administration & underwent Left SOC for tumor rxn. post up took a while to wake up. Had a head CT that showed blood in op bed & IVH. Patient has Right Field cut.     Physical Exam:  Constitutional: NAD    Neuro  * Mental Status:  GCS 15:  E(4), V(5), M(6).  Awake, alert, oriented to conversation.  * Cranial Nerves: Cnii-Cnxii grossly intact. PERRL, EOMI, + Right field cut, tongue midline, no gaze deviation  * Motor: RUE 5/5, LUE 5/5, RLE 5/5, LLE 5/5, no drift  * Sensory: Sensation intact to light touch  * Reflexes: Not assessed  * Gait: Not assessed    Cardiovascular:  S1, S2 no murmurs appreciated.  Regular rate and rhythm.  Eyes: See neurologic examination with detailed examination of eyes.  ENT: No JVD, Trachea Midline.  Respiratory: Clear to auscultation.  Gastrointestinal: Soft, nontender, nondistended.  Genitourinary: [ ] Rao, [ x ] No Rao.   Musculoskeletal: No muscle wasting noted, (See neuorlogic assessment for full muscle strength assessment) No pretibial edema appreciated, no appreciable calf tenderness.  Skin:  Wound c/d/i, + LINDSEY    Hematologic / Lymph / Immunologic: No bleeding from IV sites or wounds, No lymphadenopathy, No Hives or allergic type skin lesions    Vital Signs Last 24 Hrs  T(C): 36.5 (22 Sep 2021 04:00), Max: 36.9 (21 Sep 2021 12:00)  T(F): 97.7 (22 Sep 2021 04:00), Max: 98.4 (21 Sep 2021 12:00)  HR: 53 (22 Sep 2021 04:00) (49 - 81)  BP: 110/64 (22 Sep 2021 04:00) (87/38 - 133/71)  BP(mean): 78 (22 Sep 2021 04:00) (53 - 88)  RR: 13 (22 Sep 2021 04:00) (6 - 18)  SpO2: 96% (22 Sep 2021 04:00) (93% - 100%)    Labs & Radiology:                        10.2   8.90  )-----------( 370      ( 22 Sep 2021 04:12 )             31.7     141  |  103  |  11.8  ----------------------------<  325<H>  4.9   |  22.0  |  0.91    Ca    8.3<L>      22 Sep 2021 04:12  Phos  3.5     09-22  Mg     1.8     09-22    TPro  6.5<L>  /  Alb  3.6  /  TBili  0.3<L>  /  DBili  x   /  AST  25  /  ALT  32  /  AlkPhos  60  09-20  LIVER FUNCTIONS - ( 20 Sep 2021 13:28 )  Alb: 3.6 g/dL / Pro: 6.5 g/dL / ALK PHOS: 60 U/L / ALT: 32 U/L / AST: 25 U/L / GGT: x         PT/INR - ( 21 Sep 2021 04:21 )   PT: 11.7 sec;   INR: 1.01 ratio    PTT - ( 21 Sep 2021 04:21 )  PTT:31.7 sec  ABG - ( 21 Sep 2021 16:12 )  pH, Arterial: 7.520 pH, Blood: x     /  pCO2: 32    /  pO2: 253   / HCO3: 26    / Base Excess: 3.2   /  SaO2: 99.7      Neurosurgery Imaging:  CT Head No Cont (09.22.21 @ 01:15) >  IMPRESSION:  1. Postsurgical changes from resection of a left parieto-occipital mass with  intraparenchymal edema and blood products.  2. Small amount of postoperative subarachnoid and intraventricular hemorrhage  is noted.  3. Thin postoperative subdural collection containing air and blood products  measures up to approximately 4 mm.

## 2021-09-22 NOTE — PHYSICAL THERAPY INITIAL EVALUATION ADULT - PHYSICAL ASSIST/NONPHYSICAL ASSIST: SIT/SUPINE, REHAB EVAL
verbal cues/nonverbal cues (demo/gestures)/2 person assist verbal cues/nonverbal cues (demo/gestures)/1 person assist

## 2021-09-22 NOTE — PHYSICAL THERAPY INITIAL EVALUATION ADULT - FOLLOWS COMMANDS/ANSWERS QUESTIONS, REHAB EVAL
75% of the time
decreased processing/75% of the time/able to follow single-step instructions/aphasia

## 2021-09-22 NOTE — PHYSICAL THERAPY INITIAL EVALUATION ADULT - DISCHARGE DISPOSITION, PT EVAL
pending progress/home w/ assist/home w/ home PT Acute Rehab Services, rehab disposition recommendation may be change base on patient  functional progress

## 2021-09-22 NOTE — PROGRESS NOTE ADULT - ASSESSMENT
54F w/ PMH of prediabetes was BIBA after being found unresponsive at home. She had agonal breathing and she was brought to ER where she was intubated and found to have severe DKA, now resolved after standard treatment. Found also to have brain mass.   Consult for diabetes mgmt. A1c > 16%     T2DM- FS well controlled on insulin gtt  -to transition off gtt, can start with lantus 40 units bedtime (give lantus dose with insulin gtt 1hr prior to dc of the insulin gtt)  -while on gtt Q1 FS   -if eating, can restart admelog at 12 units TID plus moderate sliding scale  -expect sugars to be high given on dex    Brain mass- Likely primary brain tumor. s/p resection 9/21. on steroid taper

## 2021-09-22 NOTE — PHYSICAL THERAPY INITIAL EVALUATION ADULT - PHYSICAL ASSIST/NONPHYSICAL ASSIST: SIT/STAND, REHAB EVAL
verbal cues/1 person + 1 person to manage equipment verbal cues/nonverbal cues (demo/gestures)/2 person assist

## 2021-09-22 NOTE — PROGRESS NOTE ADULT - SUBJECTIVE AND OBJECTIVE BOX
54 year old female with a past medical history of  prediabetes who  was BIBA after being found unresponsive at home.  Per records, patient was reported to be in a pool of urine when she was found.  EMS reported pt had agonal breathing and attempted to intubate her in the field but were unsuccessful  They also reported BG >400.  Patient was still agonal, unresponsive, hypoxic and hypercapnic on arrival to [facility] ED at which time she was intubated by ER.  Initial work up was consistent w/ DKA. Pt was admitted to MICU.  Labs were significant for pH 6.9, serum HCO3 2, undetectable glucose level on FS and serum BG of 676.  Utox and CTH were negative.   She was started on an insulin gtt, HCO3 gtt and aggressively hydrated w/ IVNS.  ICU course was complicated by a witnessed generalized tonic clonic seizure.  She was given versed and loaded w/ keppra.  Neurology was consulted and placed on cEEG.  Pt was successfully extubated 9/9.  Gap closed and transitioned off insulin gtt to lantus as of last BID.  MR of brain seizure protocol showed abnormal signal in the left parietotemporal lobe possible acute CVA vs post seizure focus. EEG was negative and taken off Keppra. Insulin was titrated down to lantus OD and ademlog TID with meals. MRI of the brain with contrast showed a lesion in the left temporoparietal lobe with no surrounding edema. Neurosurgery was consulted. Started on empiric keppra. CT C/A/P ordered to rule out metastatic disease. Tentative plan for OR resection by neurosurgery on 09/21.    Pt underwent elective cerebral mass resection with 5-ALA use.  ROS: dizziness; otherwise, negative.  VS, labs, imaging reviewed. EKG, TTE reviewed.  Medications reviewed.    Exam:  NAD, cooperative.  AOx4, speech fluent, comprehension intact, PERRLA, EOMI, R homonymous hemianopia, motor - 5/5 x4, sensation intact to LT.  CTAB  S1S2 present  Abd soft, NT  No peripheral swelling

## 2021-09-22 NOTE — PROGRESS NOTE ADULT - SUBJECTIVE AND OBJECTIVE BOX
Interval Events:  no overnight events  follow up on diabetes    patient seen and examined at bedside.  FS well controlled on gtt    REVIEW OF SYSTEMS:    CONSTITUTIONAL: No fever, weight loss, or fatigue  EYES: No eye pain, visual disturbances, or discharge  ENMT:  No difficulty hearing, tinnitus, vertigo; No sinus or throat pain  NECK: No pain or stiffness  RESPIRATORY: No cough, wheezing, chills or hemoptysis; No shortness of breath  CARDIOVASCULAR: No chest pain, palpitations, dizziness, or leg swelling  GASTROINTESTINAL: No abdominal or epigastric pain. No nausea, vomiting, or hematemesis; No diarrhea or constipation. No melena or hematochezia.  NEUROLOGICAL: No headaches, memory loss, loss of strength, numbness, or tremors  SKIN: No itching, burning, rashes, or lesions   MUSCULOSKELETAL: No joint pain or swelling; No muscle, back, or extremity pain  PSYCHIATRIC: No depression, anxiety, mood swings, or difficulty sleeping        No Known Allergies      MEDICATIONS  (STANDING):  benzocaine 15 mG/menthol 3.6 mG (Sugar-Free) Lozenge 1 Lozenge Oral four times a day  ceFAZolin   IVPB 2000 milliGRAM(s) IV Intermittent once  ceFAZolin   IVPB 1000 milliGRAM(s) IV Intermittent every 8 hours  dexAMETHasone     Tablet   Oral   dexAMETHasone     Tablet 4 milliGRAM(s) Oral every 6 hours  dextrose 40% Gel 15 Gram(s) Oral once  dextrose 50% Injectable 25 Gram(s) IV Push once  dextrose 50% Injectable 12.5 Gram(s) IV Push once  dextrose 50% Injectable 25 Gram(s) IV Push once  famotidine    Tablet 20 milliGRAM(s) Oral every 12 hours  glucagon  Injectable 1 milliGRAM(s) IntraMuscular once  insulin regular Infusion 3 Unit(s)/Hr (3 mL/Hr) IV Continuous <Continuous>  levETIRAcetam 500 milliGRAM(s) Oral two times a day  melatonin 3 milliGRAM(s) Oral at bedtime  nystatin Powder 1 Application(s) Topical two times a day  polyethylene glycol 3350 17 Gram(s) Oral daily  senna 2 Tablet(s) Oral at bedtime  sodium chloride 0.9%. 1000 milliLiter(s) (75 mL/Hr) IV Continuous <Continuous>    MEDICATIONS  (PRN):  acetaminophen   Tablet .. 650 milliGRAM(s) Oral every 6 hours PRN Mild Pain (1 - 3)  hydrALAZINE Injectable 10 milliGRAM(s) IV Push every 2 hours PRN SBP > 130  HYDROmorphone  Injectable 0.5 milliGRAM(s) IV Push every 4 hours PRN breakthrough pain  labetalol Injectable 10 milliGRAM(s) IV Push every 2 hours PRN SBP > 130  oxyCODONE    IR 5 milliGRAM(s) Oral every 4 hours PRN Moderate Pain (4 - 6)  oxyCODONE    IR 10 milliGRAM(s) Oral every 4 hours PRN Severe Pain (7 - 10)  phenol 1.4% (CHLORASEPTIC) Oral Spray 1 Spray(s) Topical every 4 hours PRN sore throat      Vital Signs Last 24 Hrs  T(C): 36.5 (22 Sep 2021 12:00), Max: 36.5 (22 Sep 2021 04:00)  T(F): 97.7 (22 Sep 2021 12:00), Max: 97.7 (22 Sep 2021 04:00)  HR: 54 (22 Sep 2021 15:00) (49 - 81)  BP: 112/70 (22 Sep 2021 15:00) (87/38 - 133/71)  BP(mean): 82 (22 Sep 2021 15:00) (53 - 90)  RR: 18 (22 Sep 2021 13:00) (6 - 45)  SpO2: 100% (22 Sep 2021 15:00) (93% - 100%)  Weight (kg): 119.7 (09-21-21 @ 07:53)    Physical Exam:    Constitutional: NAD, well-developed  Respiratory: CTAB, normal respirations  Cardiovascular: S1 and S2, RRR  Gastrointestinal: BS+, soft, ntnd  Neurological: AOx3, no focal deficits  Psychiatric: Normal mood and normal affect  Skin: no rashes, no acanthosis      LABS  09-22    141  |  103  |  11.8  ----------------------------<  325<H>  4.9   |  22.0  |  0.91    Ca    8.3<L>      22 Sep 2021 04:12  Phos  3.5     09-22  Mg     1.8     09-22                            10.2   8.90  )-----------( 370      ( 22 Sep 2021 04:12 )             31.7       A1C with Estimated Average Glucose Result: >16.9 % (09-09-21 @ 05:25)  Thyroid Stimulating Hormone, Serum: 1.26 uIU/mL (09-09-21 @ 05:23)  A1C with Estimated Average Glucose Result: >16.9 % (09-09-21 @ 03:34)  T4, Serum: 3.1 ug/dL (09-09-21 @ 03:34)  Thyroid Stimulating Hormone, Serum: 0.50 uIU/mL (09-08-21 @ 00:50)            CAPILLARY BLOOD GLUCOSE      POCT Blood Glucose.: 115 mg/dL (22 Sep 2021 15:04)  POCT Blood Glucose.: 124 mg/dL (22 Sep 2021 14:05)  POCT Blood Glucose.: 128 mg/dL (22 Sep 2021 13:02)  POCT Blood Glucose.: 130 mg/dL (22 Sep 2021 12:04)  POCT Blood Glucose.: 124 mg/dL (22 Sep 2021 11:13)  POCT Blood Glucose.: 118 mg/dL (22 Sep 2021 10:08)  POCT Blood Glucose.: 150 mg/dL (22 Sep 2021 09:05)  POCT Blood Glucose.: 186 mg/dL (22 Sep 2021 08:03)  POCT Blood Glucose.: 206 mg/dL (22 Sep 2021 07:25)  POCT Blood Glucose.: 235 mg/dL (22 Sep 2021 06:21)  POCT Blood Glucose.: 254 mg/dL (22 Sep 2021 05:36)  POCT Blood Glucose.: 297 mg/dL (22 Sep 2021 04:41)  POCT Blood Glucose.: 309 mg/dL (22 Sep 2021 03:36)  POCT Blood Glucose.: 320 mg/dL (22 Sep 2021 02:46)  POCT Blood Glucose.: 338 mg/dL (22 Sep 2021 01:34)  POCT Blood Glucose.: 289 mg/dL (21 Sep 2021 23:31)

## 2021-09-22 NOTE — PHYSICAL THERAPY INITIAL EVALUATION ADULT - IMPAIRMENTS CONTRIBUTING IMPAIRED BED MOBILITY, REHAB EVAL
impaired balance/impaired coordination/decreased flexibility/decreased strength impaired coordination/decreased flexibility/decreased strength

## 2021-09-22 NOTE — OCCUPATIONAL THERAPY INITIAL EVALUATION ADULT - PERTINENT HX OF CURRENT PROBLEM, REHAB EVAL
pt was  found unresponsive at home, (+) (+) DKA w/ coma, seizures and severe metabolic acidosis., now s/p tumor resection

## 2021-09-22 NOTE — PHYSICAL THERAPY INITIAL EVALUATION ADULT - IMPAIRED TRANSFERS: SIT/STAND, REHAB EVAL
impaired coordination/decreased flexibility/decreased strength
impaired coordination/decreased flexibility/decreased strength

## 2021-09-22 NOTE — PHYSICAL THERAPY INITIAL EVALUATION ADULT - GAIT DISTANCE, PT EVAL
5 steps one at the time to the right in frontal plane with rest after each time in sitting 5 steps one at the time to the right in frontal plane

## 2021-09-23 LAB
ANION GAP SERPL CALC-SCNC: 13 MMOL/L — SIGNIFICANT CHANGE UP (ref 5–17)
BUN SERPL-MCNC: 14.7 MG/DL — SIGNIFICANT CHANGE UP (ref 8–20)
CALCIUM SERPL-MCNC: 8.9 MG/DL — SIGNIFICANT CHANGE UP (ref 8.6–10.2)
CHLORIDE SERPL-SCNC: 107 MMOL/L — SIGNIFICANT CHANGE UP (ref 98–107)
CO2 SERPL-SCNC: 22 MMOL/L — SIGNIFICANT CHANGE UP (ref 22–29)
CREAT SERPL-MCNC: 0.73 MG/DL — SIGNIFICANT CHANGE UP (ref 0.5–1.3)
GLUCOSE BLDC GLUCOMTR-MCNC: 120 MG/DL — HIGH (ref 70–99)
GLUCOSE BLDC GLUCOMTR-MCNC: 147 MG/DL — HIGH (ref 70–99)
GLUCOSE BLDC GLUCOMTR-MCNC: 155 MG/DL — HIGH (ref 70–99)
GLUCOSE BLDC GLUCOMTR-MCNC: 162 MG/DL — HIGH (ref 70–99)
GLUCOSE BLDC GLUCOMTR-MCNC: 192 MG/DL — HIGH (ref 70–99)
GLUCOSE BLDC GLUCOMTR-MCNC: 98 MG/DL — SIGNIFICANT CHANGE UP (ref 70–99)
GLUCOSE BLDC GLUCOMTR-MCNC: 99 MG/DL — SIGNIFICANT CHANGE UP (ref 70–99)
GLUCOSE SERPL-MCNC: 143 MG/DL — HIGH (ref 70–99)
HCT VFR BLD CALC: 30.1 % — LOW (ref 34.5–45)
HGB BLD-MCNC: 9.4 G/DL — LOW (ref 11.5–15.5)
MAGNESIUM SERPL-MCNC: 2.2 MG/DL — SIGNIFICANT CHANGE UP (ref 1.6–2.6)
MCHC RBC-ENTMCNC: 30.7 PG — SIGNIFICANT CHANGE UP (ref 27–34)
MCHC RBC-ENTMCNC: 31.2 GM/DL — LOW (ref 32–36)
MCV RBC AUTO: 98.4 FL — SIGNIFICANT CHANGE UP (ref 80–100)
PHOSPHATE SERPL-MCNC: 3.6 MG/DL — SIGNIFICANT CHANGE UP (ref 2.4–4.7)
PLATELET # BLD AUTO: 336 K/UL — SIGNIFICANT CHANGE UP (ref 150–400)
POTASSIUM SERPL-MCNC: 4.5 MMOL/L — SIGNIFICANT CHANGE UP (ref 3.5–5.3)
POTASSIUM SERPL-SCNC: 4.5 MMOL/L — SIGNIFICANT CHANGE UP (ref 3.5–5.3)
RBC # BLD: 3.06 M/UL — LOW (ref 3.8–5.2)
RBC # FLD: 16.2 % — HIGH (ref 10.3–14.5)
SODIUM SERPL-SCNC: 142 MMOL/L — SIGNIFICANT CHANGE UP (ref 135–145)
WBC # BLD: 10.21 K/UL — SIGNIFICANT CHANGE UP (ref 3.8–10.5)
WBC # FLD AUTO: 10.21 K/UL — SIGNIFICANT CHANGE UP (ref 3.8–10.5)

## 2021-09-23 PROCEDURE — 99024 POSTOP FOLLOW-UP VISIT: CPT

## 2021-09-23 PROCEDURE — 99223 1ST HOSP IP/OBS HIGH 75: CPT | Mod: GC

## 2021-09-23 PROCEDURE — 99232 SBSQ HOSP IP/OBS MODERATE 35: CPT

## 2021-09-23 PROCEDURE — 70450 CT HEAD/BRAIN W/O DYE: CPT | Mod: 26

## 2021-09-23 RX ADMIN — FAMOTIDINE 20 MILLIGRAM(S): 10 INJECTION INTRAVENOUS at 06:32

## 2021-09-23 RX ADMIN — POLYETHYLENE GLYCOL 3350 17 GRAM(S): 17 POWDER, FOR SOLUTION ORAL at 11:38

## 2021-09-23 RX ADMIN — OXYCODONE HYDROCHLORIDE 10 MILLIGRAM(S): 5 TABLET ORAL at 07:00

## 2021-09-23 RX ADMIN — Medication 2: at 11:37

## 2021-09-23 RX ADMIN — FAMOTIDINE 20 MILLIGRAM(S): 10 INJECTION INTRAVENOUS at 16:19

## 2021-09-23 RX ADMIN — Medication 3 MILLIGRAM(S): at 23:41

## 2021-09-23 RX ADMIN — INSULIN GLARGINE 30 UNIT(S): 100 INJECTION, SOLUTION SUBCUTANEOUS at 23:42

## 2021-09-23 RX ADMIN — Medication 10 UNIT(S): at 11:37

## 2021-09-23 RX ADMIN — Medication 4 MILLIGRAM(S): at 06:32

## 2021-09-23 RX ADMIN — NYSTATIN CREAM 1 APPLICATION(S): 100000 CREAM TOPICAL at 16:19

## 2021-09-23 RX ADMIN — SENNA PLUS 2 TABLET(S): 8.6 TABLET ORAL at 23:41

## 2021-09-23 RX ADMIN — LEVETIRACETAM 500 MILLIGRAM(S): 250 TABLET, FILM COATED ORAL at 06:32

## 2021-09-23 RX ADMIN — NYSTATIN CREAM 1 APPLICATION(S): 100000 CREAM TOPICAL at 06:32

## 2021-09-23 RX ADMIN — Medication 4 MILLIGRAM(S): at 11:38

## 2021-09-23 RX ADMIN — Medication 10 UNIT(S): at 16:18

## 2021-09-23 RX ADMIN — LEVETIRACETAM 500 MILLIGRAM(S): 250 TABLET, FILM COATED ORAL at 16:19

## 2021-09-23 RX ADMIN — OXYCODONE HYDROCHLORIDE 10 MILLIGRAM(S): 5 TABLET ORAL at 06:32

## 2021-09-23 RX ADMIN — Medication 4 MILLIGRAM(S): at 23:41

## 2021-09-23 RX ADMIN — Medication 2: at 16:19

## 2021-09-23 NOTE — PROGRESS NOTE ADULT - ASSESSMENT
4F w/ PMH of prediabetes was BIBA after being found unresponsive at home. She had agonal breathing and she was brought to ER where she was intubated and found to have severe DKA, now resolved after standard treatment. Found also to have brain mass.   Endocrine consult for diabetes management. A1c > 16.9%     1, T2DM- blood glucose well controlled.  -Continue Lantus 30 units daily.  -Continue Admelog 10 units AC.  -Continue Admelog sliding scale, 2:50 for BG >150.  -Continue FS AC and HS.  -Will need insulin while on steroids.  -Complete diabetes education, insulin pen use.    2. Brain mass- Likely primary brain tumor. s/p resection 9/21. on steroid taper 56F w/ PMH of prediabetes was BIBA after being found unresponsive at home. She had agonal breathing and she was brought to ER where she was intubated and found to have severe DKA, now resolved after standard treatment. Found also to have brain mass.   Endocrine consult for diabetes management. A1c > 16.9%     1, T2DM- blood glucose well controlled.  -Continue Lantus 30 units daily.  -Continue Admelog 10 units AC.  -Continue Admelog sliding scale, 2:50 for BG >150.  -Continue FS AC and HS.  -Will need insulin while on steroids.  -Complete diabetes education, insulin pen use.    2. Brain mass- Likely primary brain tumor. s/p resection 9/21. on steroid taper

## 2021-09-23 NOTE — CONSULT NOTE ADULT - SUBJECTIVE AND OBJECTIVE BOX
56yF was admitted on 09-08      Patient is a 56y old  Female who presents with a chief complaint of Unresponsive, intubated (22 Sep 2021 15:41)    HPI:  Ms. Isidro is a 56 year old right handed female with PMHx DM who was brought in from home unresponsive and found to be in DKA. She was also experiencing seizures thought to be due to her uncontrolled blood sugars. On her workup, there was a left occipital lesion found on imaging which was confirmed to be a tumor. The patient underwent craniotomy for resection of her tumor on 9/21/21 with Dr. Levy. Dark room protocol lifted as of this morning, and neurosurgery plans to pull her drain tomorrow. Patient complains of residual blurred vision.      Imaging performed:  CT head 9/7: No acute intracranial hemorrhage or mass effect.    CT cervical spine 9/7: No CT evidence of cervical spine fracture.    CT Chest 9/7: ET tube tip in the right mainstem bronchus; slight retraction is recommended.    MR Brain-Seizure, Epilepsy No Cont 9/13: Abnormal signal in the left parietotemporal lobe most likely represents an acute infarction. There is no hemorrhagic conversion. There is no midline shift or herniation. Additional differential diagnostic consideration is post seizure focus. Further correlation with MRI of the brain with contrast is suggested to exclude underlying lesion.    MR Angio Head 9/15: Corresponding to the abnormal signal seen in the previous MRI. There is a peripherally enhancing mass with satellite smaller lesions as described, most likely represents primary versus metastatic disease however, the former is more likely secondary to absence of large vasogenic edema. Tissue sampling is suggested. There is no substantial intracranial arterial stenosis or large vessel occlusion. There is no significant arterial stenosis in the neck or dissection.    CT Chest, Abdomen and Pelvis w/ Oral Cont and w/wo IV Cont 9/17: 5 mm lung nodule inseparable from left major fissure. This was not seen on the prior study although atelectatic changes were present. Trace left pleural fluid. 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 This can be confirmed with pelvic ultrasound.    MR Brain Stereotactic w/wo IV Cont 9/20: Unchanged left parieto-occipital enhancing mass.    CT Head 9/22:  1. Postsurgical changes from resection of a left parieto-occipital mass with intraparenchymal edema and blood products.  2. Small amount of postoperative subarachnoid and intraventricular hemorrhage is noted.  3. Thin postoperative subdural collection containing air and blood products measures up to approximately 4 mm.    Postoperative changes with surgical cavity is noted in the left parietal occipital lobe with surgical packing, postoperative fluid and blood components including foci of intraparenchymal hemorrhage in the left superior parietal lobe measuring 1.6 x 1.4 cm and smaller hemorrhage measuring 0.9 x 1 cm respectively. Trace amount of subdural hemorrhage is noted along the left parasagittal aspect of the falx with trace amount of hemorrhage layering in the posterior horn of the left lateral ventricle. There is surrounding mass effect. There is no midline shift or herniation.    Residual mass cannot be entirely excluded and follow-up with MRI of the brain with and without contrast is suggested in reasonable time upon resolution of blood products.    CT Head 9/23: No significant interval change      REVIEW OF SYSTEMS  Constitutional - No fever, No weight loss, No fatigue  HEENT - No eye pain, + visual disturbances, No difficulty hearing, No tinnitus, No vertigo, No neck pain  Respiratory - No cough, No wheezing, No shortness of breath  Cardiovascular - No chest pain, No palpitations  Gastrointestinal - No abdominal pain, No nausea, No vomiting, No diarrhea, No constipation  Genitourinary - No dysuria, No frequency, No hematuria, No incontinence  Neurological - + headaches, + blurred vision, No memory loss, No loss of strength, No numbness, No tremors  Skin - No itching, No rashes, No lesions   Endocrine - No temperature intolerance  Musculoskeletal - No joint pain, No joint swelling, No muscle pain  Psychiatric - No depression, No anxiety    VITALS  T(C): 36.4 (09-23-21 @ 11:04), Max: 36.8 (09-22-21 @ 15:57)  HR: 69 (09-23-21 @ 11:00) (50 - 84)  BP: 108/60 (09-23-21 @ 11:00) (95/82 - 124/73)  RR: 16 (09-23-21 @ 08:00) (10 - 18)  SpO2: 100% (09-23-21 @ 11:00) (94% - 100%)  Wt(kg): --    PAST MEDICAL & SURGICAL HISTORY  No pertinent past medical history    No significant past surgical history        SOCIAL HISTORY - as per documentation/history  Smoking - None  EtOH - None  Drugs - None    FUNCTIONAL HISTORY  Reports that she lives with her son, daughter, and grandchildren in the second floor of a 2-family home.    CURRENT FUNCTIONAL STATUS      FAMILY HISTORY       RECENT LABS - Reviewed  CBC Full  -  ( 23 Sep 2021 04:43 )  WBC Count : 10.21 K/uL  RBC Count : 3.06 M/uL  Hemoglobin : 9.4 g/dL  Hematocrit : 30.1 %  Platelet Count - Automated : 336 K/uL  Mean Cell Volume : 98.4 fl  Mean Cell Hemoglobin : 30.7 pg  Mean Cell Hemoglobin Concentration : 31.2 gm/dL  Auto Neutrophil # : x  Auto Lymphocyte # : x  Auto Monocyte # : x  Auto Eosinophil # : x  Auto Basophil # : x  Auto Neutrophil % : x  Auto Lymphocyte % : x  Auto Monocyte % : x  Auto Eosinophil % : x  Auto Basophil % : x    09-23    142  |  107  |  14.7  ----------------------------<  143<H>  4.5   |  22.0  |  0.73    Ca    8.9      23 Sep 2021 04:43  Phos  3.6     09-23  Mg     2.2     09-23          ALLERGIES  No Known Allergies      MEDICATIONS   acetaminophen   Tablet .. 650 milliGRAM(s) Oral every 6 hours PRN  benzocaine 15 mG/menthol 3.6 mG (Sugar-Free) Lozenge 1 Lozenge Oral four times a day PRN  dexAMETHasone     Tablet   Oral   dexAMETHasone     Tablet 4 milliGRAM(s) Oral every 8 hours  dextrose 40% Gel 15 Gram(s) Oral once  dextrose 50% Injectable 25 Gram(s) IV Push once  dextrose 50% Injectable 12.5 Gram(s) IV Push once  dextrose 50% Injectable 25 Gram(s) IV Push once  famotidine    Tablet 20 milliGRAM(s) Oral every 12 hours  glucagon  Injectable 1 milliGRAM(s) IntraMuscular once  hydrALAZINE Injectable 10 milliGRAM(s) IV Push every 2 hours PRN  HYDROmorphone  Injectable 0.5 milliGRAM(s) IV Push every 4 hours PRN  insulin glargine Injectable (LANTUS) 30 Unit(s) SubCutaneous at bedtime  insulin lispro (ADMELOG) corrective regimen sliding scale   SubCutaneous three times a day before meals  insulin lispro Injectable (ADMELOG) 10 Unit(s) SubCutaneous three times a day before meals  labetalol Injectable 10 milliGRAM(s) IV Push every 2 hours PRN  levETIRAcetam 500 milliGRAM(s) Oral two times a day  melatonin 3 milliGRAM(s) Oral at bedtime  nystatin Powder 1 Application(s) Topical two times a day  oxyCODONE    IR 5 milliGRAM(s) Oral every 4 hours PRN  oxyCODONE    IR 10 milliGRAM(s) Oral every 4 hours PRN  phenol 1.4% (CHLORASEPTIC) Oral Spray 1 Spray(s) Topical every 4 hours PRN  polyethylene glycol 3350 17 Gram(s) Oral daily  senna 2 Tablet(s) Oral at bedtime  sodium chloride 0.9%. 1000 milliLiter(s) IV Continuous <Continuous>      ----------------------------------------------------------------------------------------  PHYSICAL EXAM  Constitutional - NAD, Comfortable  HEENT - NCAT, EOMI  Neck - Supple, No limited ROM  Chest - Breathing comfortably, No wheezing  Cardiovascular - S1S2   Abdomen - Soft   Extremities - No C/C/E, No calf tenderness   Neurologic Exam -                    Cognitive - AAO to self, place, date, year, situation     Communication - Fluent, No dysarthria     Cranial Nerves - CN 2-12 intact     Motor - No focal deficits                    LEFT    UE - ShAB 5/5, EF 5/5, EE 5/5, WE 5/5,  5/5                    RIGHT UE - ShAB 5/5, EF 5/5, EE 5/5, WE 5/5,  5/5                    LEFT    LE - HF 5/5, KE 5/5, DF 5/5, PF 5/5                    RIGHT LE - HF 5/5, KE 5/5, DF 5/5, PF 5/5        Sensory - Intact to LT     Reflexes - DTR Intact, No primitive reflexive     Coordination - FTN intact     OculoVestibular - No saccades, No nystagmus, VOR         Balance - WNL Static  Psychiatric - Mood stable, Affect WNL  ----------------------------------------------------------------------------------------  ASSESSMENT/PLAN  56yFemale with functional deficits after  Pain - Tylenol  DVT PPX - SCDs  Rehab -    Recommend ACUTE inpatient rehabilitation for the functional deficits consisting of 3 hours of therapy/day & 24 hour RN/daily PMR physician for comorbid medical management. Patient will be able to tolerate 3 hours a day.   Recommend [facility], patient DOES NOT meet acute inpatient rehabilitation criteria. Patient needs a more prolonged stay to achieve transition to community.    Expect patient to achieve functional goals for DC HOME with OUTPATIENT   Expect patient to achieve functional goals for DC HOME with HOME CARE   Follow up with [facility] - Call [phone] for an appointment    Will continue to follow. Functional progress will determine ongoing rehab dispo recommendations, which may change.    Continue bedside therapy as well as OOB throughout the day with mobilization by staff to maintain cardiopulmonary function and prevention of secondary complications related to debility.     Discussed with rehab team.  56yF was admitted on 09-08      Patient is a 56y old  Female who presents with a chief complaint of Unresponsive, intubated (22 Sep 2021 15:41)    HPI:  Ms. Isidro is a 56 year old right handed female with PMHx DM who was brought in from home unresponsive and found to be in DKA. She was also experiencing seizures thought to be due to her uncontrolled blood sugars. On her workup, there was a left occipital lesion found on imaging which was confirmed to be a tumor. The patient underwent craniotomy for resection of her tumor on 9/21/21 with Dr. Levy. Dark room protocol lifted as of this morning, and neurosurgery plans to pull her drain tomorrow. Patient complains of residual blurred vision.      Imaging performed:  CT head 9/7: No acute intracranial hemorrhage or mass effect.    CT cervical spine 9/7: No CT evidence of cervical spine fracture.    CT Chest 9/7: ET tube tip in the right mainstem bronchus; slight retraction is recommended.    MR Brain-Seizure, Epilepsy No Cont 9/13: Abnormal signal in the left parietotemporal lobe most likely represents an acute infarction. There is no hemorrhagic conversion. There is no midline shift or herniation. Additional differential diagnostic consideration is post seizure focus. Further correlation with MRI of the brain with contrast is suggested to exclude underlying lesion.    MR Angio Head 9/15: Corresponding to the abnormal signal seen in the previous MRI. There is a peripherally enhancing mass with satellite smaller lesions as described, most likely represents primary versus metastatic disease however, the former is more likely secondary to absence of large vasogenic edema. Tissue sampling is suggested. There is no substantial intracranial arterial stenosis or large vessel occlusion. There is no significant arterial stenosis in the neck or dissection.    CT Chest, Abdomen and Pelvis w/ Oral Cont and w/wo IV Cont 9/17: 5 mm lung nodule inseparable from left major fissure. This was not seen on the prior study although atelectatic changes were present. Trace left pleural fluid. 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 This can be confirmed with pelvic ultrasound.    MR Brain Stereotactic w/wo IV Cont 9/20: Unchanged left parieto-occipital enhancing mass.    CT Head 9/22:  1. Postsurgical changes from resection of a left parieto-occipital mass with intraparenchymal edema and blood products.  2. Small amount of postoperative subarachnoid and intraventricular hemorrhage is noted.  3. Thin postoperative subdural collection containing air and blood products measures up to approximately 4 mm.    Postoperative changes with surgical cavity is noted in the left parietal occipital lobe with surgical packing, postoperative fluid and blood components including foci of intraparenchymal hemorrhage in the left superior parietal lobe measuring 1.6 x 1.4 cm and smaller hemorrhage measuring 0.9 x 1 cm respectively. Trace amount of subdural hemorrhage is noted along the left parasagittal aspect of the falx with trace amount of hemorrhage layering in the posterior horn of the left lateral ventricle. There is surrounding mass effect. There is no midline shift or herniation.  Residual mass cannot be entirely excluded and follow-up with MRI of the brain with and without contrast is suggested in reasonable time upon resolution of blood products.    CT Head 9/23: No significant interval change      REVIEW OF SYSTEMS  Constitutional - No fever, No weight loss, No fatigue  HEENT - No eye pain, + visual disturbances, No difficulty hearing, No tinnitus, No vertigo, No neck pain  Respiratory - No cough, No wheezing, No shortness of breath  Cardiovascular - No chest pain, No palpitations  Gastrointestinal - No abdominal pain, No nausea, No vomiting, No diarrhea, No constipation  Genitourinary - No dysuria, No frequency, No hematuria, No incontinence  Neurological - + headaches, + blurred vision, No memory loss, No loss of strength, No numbness, No tremors  Skin - No itching, No rashes, No lesions   Endocrine - No temperature intolerance  Musculoskeletal - No joint pain, No joint swelling, No muscle pain  Psychiatric - No depression, No anxiety    VITALS  T(C): 36.4 (09-23-21 @ 11:04), Max: 36.8 (09-22-21 @ 15:57)  HR: 69 (09-23-21 @ 11:00) (50 - 84)  BP: 108/60 (09-23-21 @ 11:00) (95/82 - 124/73)  RR: 16 (09-23-21 @ 08:00) (10 - 18)  SpO2: 100% (09-23-21 @ 11:00) (94% - 100%)  Wt(kg): --    PAST MEDICAL & SURGICAL HISTORY  No pertinent past medical history    No significant past surgical history        SOCIAL HISTORY - as per documentation/history  Smoking - None  EtOH - None  Drugs - None    FUNCTIONAL HISTORY  Reports that she lives with her son, daughter, and grandchildren in the second floor of a 2-family home.    CURRENT FUNCTIONAL STATUS  9/23 PT  Bed Mobility  Bed Mobility Training Rehab Potential: good, to achieve stated therapy goals  Bed Mobility Training Symptoms Noted During/After Treatment: dizziness  Bed Mobility Training Rolling/Turning: stand-by assist;  verbal cues;  nonverbal cues (demo/gestures);  bed rails  Bed Mobility Training Supine-to-Sit: stand-by assist;  nonverbal cues (demo/gestures);  verbal cues;  bed rails;  head of the bed elevated   Bed Mobility Training Limitations: decreased ability to use arms for pushing/pulling;  decreased ability to use legs for bridging/pushing;  decreased strength    Sit-Stand Transfer Training  Sit-to-Stand Transfer Training Rehab Potential: good, to achieve stated therapy goals  Sit-to-Stand Transfer Training Symptoms Noted During/After Treatment: dizziness  Transfer Training Sit-to-Stand Transfer: minimum assist (75% patient effort);  2 person assist;  nonverbal cues (demo/gestures);  verbal cues;  full weight-bearing   Solitario LE    hand held assist   Transfer Training Stand-to-Sit Transfer: minimum assist (75% patient effort);  1 person assist;  nonverbal cues (demo/gestures);  verbal cues;  full weight-bearing   hand held assist   Sit-to-Stand Transfer Training Transfer Safety Analysis: decreased strength;  impaired balance    Gait Training  Gait Training Rehab Potential: good, to achieve stated therapy goals  Gait Training Symptoms Noted During/After Treatment: dizziness  Gait Training: minimum assist (75% patient effort);  2 person assist;  nonverbal cues (demo/gestures);  verbal cues;  full weight-bearing   Solitario LE   hand held assist ;  5 feet  Gait Analysis: decreased hip/knee flexion;  decreased velocity of limb motion;  decreased step length;  decreased strength;  impaired balance    9/22 OT  Visual Assessment:   · Visual Acuity	c/o blurry vision  "my eyes feel tight"   	  · Visual Neglect	right decreased awareness    Bathing Training:     · Level of [place]	moderate assist (50% patients effort)  · Physical Assist/Nonphysical Assist	verbal cues; 1 person assist    Upper Body Dressing Training:     · Level of [place]	stand-by assist  · Physical Assist/Nonphysical Assist	requires assist for item retrieval    Lower Body Dressing Training:     · Level of [place]	moderate assist (50% patients effort)  · Physical Assist/Nonphysical Assist	verbal cues; 1 person assist; requires assist for item retrieval    Toilet Hygiene Training:     · Level of [place]	moderate assist (50% patients effort)    Grooming Training:     · Level of [place]	minimum assist (75% patients effort)  · Physical Assist/Nonphysical Assist	verbal cues; 1 person assist        FAMILY HISTORY   She states that her father passed in his 80s with an unknown cancer.    RECENT LABS - Reviewed  CBC Full  -  ( 23 Sep 2021 04:43 )  WBC Count : 10.21 K/uL  RBC Count : 3.06 M/uL  Hemoglobin : 9.4 g/dL  Hematocrit : 30.1 %  Platelet Count - Automated : 336 K/uL  Mean Cell Volume : 98.4 fl  Mean Cell Hemoglobin : 30.7 pg  Mean Cell Hemoglobin Concentration : 31.2 gm/dL  Auto Neutrophil # : x  Auto Lymphocyte # : x  Auto Monocyte # : x  Auto Eosinophil # : x  Auto Basophil # : x  Auto Neutrophil % : x  Auto Lymphocyte % : x  Auto Monocyte % : x  Auto Eosinophil % : x  Auto Basophil % : x    09-23    142  |  107  |  14.7  ----------------------------<  143<H>  4.5   |  22.0  |  0.73    Ca    8.9      23 Sep 2021 04:43  Phos  3.6     09-23  Mg     2.2     09-23          ALLERGIES  No Known Allergies      MEDICATIONS   acetaminophen   Tablet .. 650 milliGRAM(s) Oral every 6 hours PRN  benzocaine 15 mG/menthol 3.6 mG (Sugar-Free) Lozenge 1 Lozenge Oral four times a day PRN  dexAMETHasone     Tablet   Oral   dexAMETHasone     Tablet 4 milliGRAM(s) Oral every 8 hours  dextrose 40% Gel 15 Gram(s) Oral once  dextrose 50% Injectable 25 Gram(s) IV Push once  dextrose 50% Injectable 12.5 Gram(s) IV Push once  dextrose 50% Injectable 25 Gram(s) IV Push once  famotidine    Tablet 20 milliGRAM(s) Oral every 12 hours  glucagon  Injectable 1 milliGRAM(s) IntraMuscular once  hydrALAZINE Injectable 10 milliGRAM(s) IV Push every 2 hours PRN  HYDROmorphone  Injectable 0.5 milliGRAM(s) IV Push every 4 hours PRN  insulin glargine Injectable (LANTUS) 30 Unit(s) SubCutaneous at bedtime  insulin lispro (ADMELOG) corrective regimen sliding scale   SubCutaneous three times a day before meals  insulin lispro Injectable (ADMELOG) 10 Unit(s) SubCutaneous three times a day before meals  labetalol Injectable 10 milliGRAM(s) IV Push every 2 hours PRN  levETIRAcetam 500 milliGRAM(s) Oral two times a day  melatonin 3 milliGRAM(s) Oral at bedtime  nystatin Powder 1 Application(s) Topical two times a day  oxyCODONE    IR 5 milliGRAM(s) Oral every 4 hours PRN  oxyCODONE    IR 10 milliGRAM(s) Oral every 4 hours PRN  phenol 1.4% (CHLORASEPTIC) Oral Spray 1 Spray(s) Topical every 4 hours PRN  polyethylene glycol 3350 17 Gram(s) Oral daily  senna 2 Tablet(s) Oral at bedtime  sodium chloride 0.9%. 1000 milliLiter(s) IV Continuous <Continuous>      ----------------------------------------------------------------------------------------  PHYSICAL EXAM  Constitutional - NAD, Comfortable  HEENT - NCAT, EOMI, +drain  Neck - Supple, No limited ROM  Chest - Breathing comfortably, No wheezing  Cardiovascular - S1S2   Abdomen - Soft   Extremities - No C/C/E, No calf tenderness   Neurologic Exam -                    Cognitive - AAO to self, place, month, situation     Communication - Fluent     Cranial Nerves - CN 2-12 intact except decreased peripheral vision worse in the right lower quadrant than the right upper quadrant     Motor - No focal deficits                    LEFT    UE - ShAB 5/5, EF 5/5, EE 5/5, WE 5/5,  5/5                    RIGHT UE - ShAB 5/5, EF 5/5, EE 5/5, WE 5/5,  5/5                    LEFT    LE - HF 5/5, KE 5/5, DF 5/5, PF 5/5                    RIGHT LE - HF 5/5, KE 5/5, DF 5/5, PF 5/5        Sensory - Intact to LT  Psychiatric - Mood stable, Affect WNL  ----------------------------------------------------------------------------------------  ASSESSMENT/PLAN  56yFemale with functional deficits after undergoing craniotomy and resection of left occipital brain tumor  Brain Tumor - Decadron, Keppra  HTN - Hydralazine, Labetalol  DM - Lantus, Lispro  Pain - Tylenol, oxycodone, hydromorphone  DVT PPX - SCDs  Rehab -   Although patient may benefit from ACUTE inpatient rehabilitation, she insists on discharge home where she states that she will have help from her family.  Will continue to follow. Functional progress will determine ongoing rehab dispo recommendations, which may change.  Continue bedside therapy as well as OOB throughout the day with mobilization by staff to maintain cardiopulmonary function and prevention of secondary complications related to debility.     Discussed with rehab team.

## 2021-09-23 NOTE — PROGRESS NOTE ADULT - SUBJECTIVE AND OBJECTIVE BOX
INTERVAL HPI/OVERNIGHT EVENTS:  56y Female with reportedly no PMH with "DKA attack" in past, now presented to [facility] after found unresponsive in a pool of urine, found in DKA, treated in MICU, course complicated by seizure, during seizure workup found with new left occipital lesion, now s/p left occipital craniotomy for tumor resection POD#2. Patient seen earlier today, sitting in chair, states she feels tired but otherwise no other complaints    Vital Signs Last 24 Hrs  T(C): 36.4 (23 Sep 2021 11:04), Max: 36.8 (22 Sep 2021 15:57)  T(F): 97.6 (23 Sep 2021 11:04), Max: 98.3 (22 Sep 2021 15:57)  HR: 80 (23 Sep 2021 13:00) (50 - 84)  BP: 112/60 (23 Sep 2021 13:00) (95/82 - 124/73)  BP(mean): 75 (23 Sep 2021 13:00) (73 - 95)  RR: 16 (23 Sep 2021 13:00) (10 - 18)  SpO2: 100% (23 Sep 2021 13:00) (94% - 100%)    PHYSICAL EXAM:  GENERAL: NAD, well-groomed, well-developed  HEAD: craniotomy dressing c/d/i  ZORAIDA COMA SCORE: E- 4 V- 5 M- 6=15  MENTAL STATUS: Awake, alert, oriented to self, hospital, September 2021; Appropriately conversant without aphasia; following commands  CRANIAL NERVES: Right homonymous hemianopsia. PERRL. EOMI without nystagmus. Facial sensation intact V1-3 distribution b/l. Face symmetric w/ normal eye closure and smile, tongue midline. Hearing grossly intact. Speech clear  MOTOR: strength 5/5 b/l upper and lower extremities, no drift  SENSATION: grossly intact to light touch all extremities    LABS:                        9.4    10.21 )-----------( 336      ( 23 Sep 2021 04:43 )             30.1     09-23    142  |  107  |  14.7  ----------------------------<  143<H>  4.5   |  22.0  |  0.73    Ca    8.9      23 Sep 2021 04:43  Phos  3.6     09-23  Mg     2.2     09-23 09-22 @ 07:01  -  09-23 @ 07:00  --------------------------------------------------------  IN: 2977.5 mL / OUT: 1370 mL / NET: 1607.5 mL    09-23 @ 07:01  -  09-23 @ 13:45  --------------------------------------------------------  IN: 375 mL / OUT: 0 mL / NET: 375 mL    RADIOLOGY & ADDITIONAL TESTS:  CT Head No Cont (09.23.21 @ 03:55)  IMPRESSION:  No significant interval change    CT Head No Cont (09.22.21 @ 01:15)  IMPRESSION:  1. Postsurgical changes from resection of a left parieto-occipital mass with  intraparenchymal edema and blood products.  2. Small amount of postoperative subarachnoid and intraventricular hemorrhage  is noted.  3. Thin postoperative subdural collection containing air and blood products  measures up to approximately 4 mm.    MR Brain Stereotactic w/wo IV Cont (09.20.21 @ 13:22)   IMPRESSION:  Unchanged left parieto-occipital enhancing mass.

## 2021-09-23 NOTE — PROGRESS NOTE ADULT - SUBJECTIVE AND OBJECTIVE BOX
54 year old female with a past medical history of  prediabetes who  was BIBA after being found unresponsive at home.  Per records, patient was reported to be in a pool of urine when she was found.  EMS reported pt had agonal breathing and attempted to intubate her in the field but were unsuccessful  They also reported BG >400.  Patient was still agonal, unresponsive, hypoxic and hypercapnic on arrival to [facility] ED at which time she was intubated by ER.  Initial work up was consistent w/ DKA. Pt was admitted to MICU.  Labs were significant for pH 6.9, serum HCO3 2, undetectable glucose level on FS and serum BG of 676.  Utox and CTH were negative.   She was started on an insulin gtt, HCO3 gtt and aggressively hydrated w/ IVNS.  ICU course was complicated by a witnessed generalized tonic clonic seizure.  She was given versed and loaded w/ keppra.  Neurology was consulted and placed on cEEG.  Pt was successfully extubated 9/9.  Gap closed and transitioned off insulin gtt to lantus as of last BID.  MR of brain seizure protocol showed abnormal signal in the left parietotemporal lobe possible acute CVA vs post seizure focus. EEG was negative and taken off Keppra. Insulin was titrated down to lantus OD and ademlog TID with meals. MRI of the brain with contrast showed a lesion in the left temporoparietal lobe with no surrounding edema. Neurosurgery was consulted. Started on empiric keppra. CT C/A/P ordered to rule out metastatic disease. Tentative plan for OR resection by neurosurgery on 09/21.  Pt underwent elective cerebral mass resection with 5-ALA use.    No o/n events.  ROS: improving dizziness; otherwise, negative.  VS, labs, imaging reviewed.  Medications reviewed.    Exam: stable  NAD, cooperative.  AOx4, speech fluent, comprehension intact, PERRLA, EOMI, R homonymous hemianopia, motor - 5/5 x4, sensation intact to LT.  CTAB  S1S2 present  Abd soft, NT  No peripheral swelling

## 2021-09-23 NOTE — PROGRESS NOTE ADULT - TIME BILLING
review of relevant history, clinical examination, review of data and images, discussion of treatment with the multidisciplinary team and any consultants involved in this patient’s care as well as family discussion.
Assessing presenting problems of acute illness, as well as medical decision making including initiating plan of care, obtaining history, examining the patient, reviewing data, reviewing radiologic exams, coordinating care with multidisciplinary team and writing this note.
review of relevant history, clinical examination, review of data and images, discussion of treatment with the multidisciplinary team and any consultants involved in this patient’s care as well as family discussion.
Assessing presenting problems of acute illness, as well as medical decision making including initiating plan of care, obtaining history, examining the patient, reviewing data, reviewing radiologic exams, coordinating care with multidisciplinary team and writing this note.
review of relevant history, clinical examination, review of data and images, discussion of treatment with the multidisciplinary team and any consultants involved in this patient’s care as well as family discussion.

## 2021-09-23 NOTE — PROGRESS NOTE ADULT - ASSESSMENT
56y Female with reportedly no PMH with "DKA attack" in past, now presented to [facility] after found unresponsive in a pool of urine, found in DKA, treated in MICU, course complicated by seizure, during seizure workup found with new left occipital lesion, now s/p left occipital craniotomy for tumor resection POD#2  - Exam stable, +R homonymous hemianopsia    PLAN:  - D/w Dr. Levy  - Ok for Q2 neuro checks, downgrade to SDU  - Continue subgaleal drain to bulb suction-- likely d/c in AM  - CT head in AM  - MR brain w/wo contrast today or tomorrow  - Continue decadron taper  - Continue Keppra 500 Q12  - Melatonin 3 QHS  - Pain control PRN, avoid oversedation  - Senna/miralax   - On CCB diet  - SCDs for DVT ppx for now  - Hold ACT/APT for now-- pending repeat CTH in AM, if stable likely start chemical DVT ppx when subgaleal drain dced  - PT/OT/PMR following-- recommending AR

## 2021-09-23 NOTE — PROGRESS NOTE ADULT - ASSESSMENT
53 yo F with L occipital mass, s/p resection with 5-ALA 9/21.  R homonymous hemianopia. Lightheadedness likely due to decreased PO + need for lower BP + opiates.  Initially admitted with DKA and witnessed sz.  DKA, resolved; poorly controlled DM with A1c >16. On Lantus/pre meal insulin now, well controlled.  Witnessed seizure on admission, on Keppra; no new episodes reported.  Mild polyuria.    Plan:  neurochecks   PO keppra - cont   d/c dark room settings  drains per NSGy - possible removal in am  CTH in am  maintain -140, wean off Cardene  diet as tolerated   30 U of LAntus/5 U of premeal regimen with moderate ISS as on steroids, adjust as needed  d/c IVF  I/Os  SCDs, hold SQL ppx till am as <48 hrs postop, re-eval in am after drain pull and stability CTh  OOB, PT    Stable to be downgraded to SDU.

## 2021-09-23 NOTE — PROGRESS NOTE ADULT - SUBJECTIVE AND OBJECTIVE BOX
Follow up: diabetes  Interval Hx/Events: s/p resection of brain mass on 9/21    MEDICATIONS  (STANDING):  dexAMETHasone     Tablet   Oral   dexAMETHasone     Tablet 4 milliGRAM(s) Oral every 8 hours  dextrose 40% Gel 15 Gram(s) Oral once  dextrose 50% Injectable 25 Gram(s) IV Push once  dextrose 50% Injectable 12.5 Gram(s) IV Push once  dextrose 50% Injectable 25 Gram(s) IV Push once  famotidine    Tablet 20 milliGRAM(s) Oral every 12 hours  glucagon  Injectable 1 milliGRAM(s) IntraMuscular once  insulin glargine Injectable (LANTUS) 30 Unit(s) SubCutaneous at bedtime  insulin lispro (ADMELOG) corrective regimen sliding scale   SubCutaneous three times a day before meals  insulin lispro Injectable (ADMELOG) 10 Unit(s) SubCutaneous three times a day before meals  levETIRAcetam 500 milliGRAM(s) Oral two times a day  melatonin 3 milliGRAM(s) Oral at bedtime  nystatin Powder 1 Application(s) Topical two times a day  polyethylene glycol 3350 17 Gram(s) Oral daily  senna 2 Tablet(s) Oral at bedtime    MEDICATIONS  (PRN):  acetaminophen   Tablet .. 650 milliGRAM(s) Oral every 6 hours PRN Mild Pain (1 - 3)  benzocaine 15 mG/menthol 3.6 mG (Sugar-Free) Lozenge 1 Lozenge Oral four times a day PRN Sore Throat  hydrALAZINE Injectable 10 milliGRAM(s) IV Push every 2 hours PRN SBP > 130  HYDROmorphone  Injectable 0.5 milliGRAM(s) IV Push every 4 hours PRN breakthrough pain  oxyCODONE    IR 5 milliGRAM(s) Oral every 4 hours PRN Moderate Pain (4 - 6)  oxyCODONE    IR 10 milliGRAM(s) Oral every 4 hours PRN Severe Pain (7 - 10)  phenol 1.4% (CHLORASEPTIC) Oral Spray 1 Spray(s) Topical every 4 hours PRN sore throat      ALLERGIES: No Known Allergies      REVIEW OF SYSTEMS: patient sitting comfortably in chair, no complaints.      Vital Signs Last 24 Hrs  T(C): 36.4 (23 Sep 2021 11:04), Max: 36.8 (22 Sep 2021 15:57)  T(F): 97.6 (23 Sep 2021 11:04), Max: 98.3 (22 Sep 2021 15:57)  HR: 76 (23 Sep 2021 15:00) (52 - 84)  BP: 107/78 (23 Sep 2021 15:00) (95/82 - 124/73)  BP(mean): 86 (23 Sep 2021 15:00) (73 - 95)  RR: 16 (23 Sep 2021 15:00) (10 - 18)  SpO2: 100% (23 Sep 2021 15:00) (94% - 100%)    Physical Exam:  General appearance: Well developed  Eyes: Pupils equal. EOMI  Neck: Trachea midline. No thyroid enlargement. No palpable thyroid nodules  Lungs: Normal respiratory excursion. Lungs clear no w/r/r  CV: Normal S1S2, regular. No m/r/g.   Abdomen: Soft, nontender, nondistended, (+) BS  Musculoskeletal: No cyanosis, clubbing, or edema.  Skin: Warm and moist. No rash. No acanthosis. Feet: no ulcers  Psych: Normal affect, good judgement/insight      LABS:                        9.4    10.21 )-----------( 336      ( 23 Sep 2021 04:43 )             30.1     09-23    142  |  107  |  14.7  ----------------------------<  143<H>  4.5   |  22.0  |  0.73    Ca    8.9      23 Sep 2021 04:43  Phos  3.6     09-23  Mg     2.2     09-23          A1C with Estimated Average Glucose Result: >16.9 % (09-09-21 @ 05:25)  A1C with Estimated Average Glucose Result: >16.9 % (09-09-21 @ 03:34)      CAPILLARY BLOOD GLUCOSE  POCT Blood Glucose.: 162 mg/dL (23 Sep 2021 11:17)  POCT Blood Glucose.: 99 mg/dL (23 Sep 2021 07:56)  POCT Blood Glucose.: 120 mg/dL (23 Sep 2021 06:31)  POCT Blood Glucose.: 147 mg/dL (23 Sep 2021 04:10)  POCT Blood Glucose.: 98 mg/dL (23 Sep 2021 00:34)  POCT Blood Glucose.: 87 mg/dL (22 Sep 2021 22:39)  POCT Blood Glucose.: 129 mg/dL (22 Sep 2021 20:41)  POCT Blood Glucose.: 127 mg/dL (22 Sep 2021 19:10)  POCT Blood Glucose.: 117 mg/dL (22 Sep 2021 18:07)  POCT Blood Glucose.: 95 mg/dL (22 Sep 2021 17:05)  POCT Blood Glucose.: 115 mg/dL (22 Sep 2021 15:04)  POCT Blood Glucose.: 124 mg/dL (22 Sep 2021 14:05)  POCT Blood Glucose.: 128 mg/dL (22 Sep 2021 13:02)  POCT Blood Glucose.: 130 mg/dL (22 Sep 2021 12:04)  POCT Blood Glucose.: 124 mg/dL (22 Sep 2021 11:13)  POCT Blood Glucose.: 118 mg/dL (22 Sep 2021 10:08)  POCT Blood Glucose.: 150 mg/dL (22 Sep 2021 09:05)  POCT Blood Glucose.: 186 mg/dL (22 Sep 2021 08:03)  POCT Blood Glucose.: 206 mg/dL (22 Sep 2021 07:25)  POCT Blood Glucose.: 235 mg/dL (22 Sep 2021 06:21)  POCT Blood Glucose.: 254 mg/dL (22 Sep 2021 05:36)  POCT Blood Glucose.: 297 mg/dL (22 Sep 2021 04:41)  POCT Blood Glucose.: 309 mg/dL (22 Sep 2021 03:36)  POCT Blood Glucose.: 320 mg/dL (22 Sep 2021 02:46)  POCT Blood Glucose.: 338 mg/dL (22 Sep 2021 01:34)  POCT Blood Glucose.: 289 mg/dL (21 Sep 2021 23:31)      Triiodothyronine, Total (T3 Total): 44 ng/dL [80 - 200] (09-09-21)  Thyroid Stimulating Hormone, Serum: 1.26 uIU/mL [0.27 - 4.20] (09-09-21)  T4, Serum: 3.1 ug/dL [4.5 - 12.0] (09-09-21)  Thyroid Stimulating Hormone, Serum: 0.50 uIU/mL [0.27 - 4.20] (09-08-21)

## 2021-09-24 LAB
ALBUMIN SERPL ELPH-MCNC: 3.3 G/DL — SIGNIFICANT CHANGE UP (ref 3.3–5.2)
ALP SERPL-CCNC: 65 U/L — SIGNIFICANT CHANGE UP (ref 40–120)
ALT FLD-CCNC: 54 U/L — HIGH
ANION GAP SERPL CALC-SCNC: 10 MMOL/L — SIGNIFICANT CHANGE UP (ref 5–17)
APTT BLD: 29.3 SEC — SIGNIFICANT CHANGE UP (ref 27.5–35.5)
AST SERPL-CCNC: 61 U/L — HIGH
BILIRUB SERPL-MCNC: 0.3 MG/DL — LOW (ref 0.4–2)
BUN SERPL-MCNC: 15 MG/DL — SIGNIFICANT CHANGE UP (ref 8–20)
CALCIUM SERPL-MCNC: 8.9 MG/DL — SIGNIFICANT CHANGE UP (ref 8.6–10.2)
CHLORIDE SERPL-SCNC: 105 MMOL/L — SIGNIFICANT CHANGE UP (ref 98–107)
CO2 SERPL-SCNC: 24 MMOL/L — SIGNIFICANT CHANGE UP (ref 22–29)
CREAT SERPL-MCNC: 0.7 MG/DL — SIGNIFICANT CHANGE UP (ref 0.5–1.3)
CRP SERPL-MCNC: <4 MG/L — SIGNIFICANT CHANGE UP
FOLATE SERPL-MCNC: 11.9 NG/ML — SIGNIFICANT CHANGE UP
GLUCOSE BLDC GLUCOMTR-MCNC: 129 MG/DL — HIGH (ref 70–99)
GLUCOSE BLDC GLUCOMTR-MCNC: 145 MG/DL — HIGH (ref 70–99)
GLUCOSE BLDC GLUCOMTR-MCNC: 239 MG/DL — HIGH (ref 70–99)
GLUCOSE BLDC GLUCOMTR-MCNC: 84 MG/DL — SIGNIFICANT CHANGE UP (ref 70–99)
GLUCOSE SERPL-MCNC: 149 MG/DL — HIGH (ref 70–99)
HCT VFR BLD CALC: 28.1 % — LOW (ref 34.5–45)
HGB BLD-MCNC: 8.9 G/DL — LOW (ref 11.5–15.5)
INR BLD: 1.13 RATIO — SIGNIFICANT CHANGE UP (ref 0.88–1.16)
IRON SATN MFR SERPL: 130 UG/DL — SIGNIFICANT CHANGE UP (ref 37–145)
IRON SATN MFR SERPL: 40 % — SIGNIFICANT CHANGE UP (ref 14–50)
MAGNESIUM SERPL-MCNC: 2 MG/DL — SIGNIFICANT CHANGE UP (ref 1.6–2.6)
MCHC RBC-ENTMCNC: 31.3 PG — SIGNIFICANT CHANGE UP (ref 27–34)
MCHC RBC-ENTMCNC: 31.7 GM/DL — LOW (ref 32–36)
MCV RBC AUTO: 98.9 FL — SIGNIFICANT CHANGE UP (ref 80–100)
PHOSPHATE SERPL-MCNC: 2.5 MG/DL — SIGNIFICANT CHANGE UP (ref 2.4–4.7)
PLATELET # BLD AUTO: 295 K/UL — SIGNIFICANT CHANGE UP (ref 150–400)
POTASSIUM SERPL-MCNC: 4.5 MMOL/L — SIGNIFICANT CHANGE UP (ref 3.5–5.3)
POTASSIUM SERPL-SCNC: 4.5 MMOL/L — SIGNIFICANT CHANGE UP (ref 3.5–5.3)
PROT SERPL-MCNC: 5.7 G/DL — LOW (ref 6.6–8.7)
PROTHROM AB SERPL-ACNC: 13 SEC — SIGNIFICANT CHANGE UP (ref 10.6–13.6)
RBC # BLD: 2.84 M/UL — LOW (ref 3.8–5.2)
RBC # BLD: 2.84 M/UL — LOW (ref 3.8–5.2)
RBC # FLD: 15.8 % — HIGH (ref 10.3–14.5)
RETICS #: 67 K/UL — SIGNIFICANT CHANGE UP (ref 25–125)
RETICS/RBC NFR: 2.4 % — SIGNIFICANT CHANGE UP (ref 0.5–2.5)
SODIUM SERPL-SCNC: 139 MMOL/L — SIGNIFICANT CHANGE UP (ref 135–145)
TIBC SERPL-MCNC: 329 UG/DL — SIGNIFICANT CHANGE UP (ref 220–430)
TRANSFERRIN SERPL-MCNC: 230 MG/DL — SIGNIFICANT CHANGE UP (ref 192–382)
VIT B12 SERPL-MCNC: 1004 PG/ML — SIGNIFICANT CHANGE UP (ref 232–1245)
WBC # BLD: 6.38 K/UL — SIGNIFICANT CHANGE UP (ref 3.8–10.5)
WBC # FLD AUTO: 6.38 K/UL — SIGNIFICANT CHANGE UP (ref 3.8–10.5)

## 2021-09-24 PROCEDURE — 70450 CT HEAD/BRAIN W/O DYE: CPT | Mod: 26

## 2021-09-24 PROCEDURE — 99233 SBSQ HOSP IP/OBS HIGH 50: CPT | Mod: GC

## 2021-09-24 PROCEDURE — 99232 SBSQ HOSP IP/OBS MODERATE 35: CPT

## 2021-09-24 RX ORDER — ENOXAPARIN SODIUM 100 MG/ML
40 INJECTION SUBCUTANEOUS AT BEDTIME
Refills: 0 | Status: DISCONTINUED | OUTPATIENT
Start: 2021-09-24 | End: 2021-09-27

## 2021-09-24 RX ADMIN — ENOXAPARIN SODIUM 40 MILLIGRAM(S): 100 INJECTION SUBCUTANEOUS at 21:11

## 2021-09-24 RX ADMIN — POLYETHYLENE GLYCOL 3350 17 GRAM(S): 17 POWDER, FOR SOLUTION ORAL at 11:58

## 2021-09-24 RX ADMIN — FAMOTIDINE 20 MILLIGRAM(S): 10 INJECTION INTRAVENOUS at 06:35

## 2021-09-24 RX ADMIN — Medication 2 MILLIGRAM(S): at 21:11

## 2021-09-24 RX ADMIN — OXYCODONE HYDROCHLORIDE 5 MILLIGRAM(S): 5 TABLET ORAL at 12:02

## 2021-09-24 RX ADMIN — LEVETIRACETAM 500 MILLIGRAM(S): 250 TABLET, FILM COATED ORAL at 06:35

## 2021-09-24 RX ADMIN — Medication 10 UNIT(S): at 12:00

## 2021-09-24 RX ADMIN — NYSTATIN CREAM 1 APPLICATION(S): 100000 CREAM TOPICAL at 07:34

## 2021-09-24 RX ADMIN — NYSTATIN CREAM 1 APPLICATION(S): 100000 CREAM TOPICAL at 17:15

## 2021-09-24 RX ADMIN — Medication 4 MILLIGRAM(S): at 06:34

## 2021-09-24 RX ADMIN — Medication 4: at 11:58

## 2021-09-24 RX ADMIN — Medication 10 UNIT(S): at 08:59

## 2021-09-24 RX ADMIN — FAMOTIDINE 20 MILLIGRAM(S): 10 INJECTION INTRAVENOUS at 17:16

## 2021-09-24 RX ADMIN — OXYCODONE HYDROCHLORIDE 5 MILLIGRAM(S): 5 TABLET ORAL at 14:00

## 2021-09-24 RX ADMIN — Medication 2 MILLIGRAM(S): at 15:54

## 2021-09-24 RX ADMIN — LEVETIRACETAM 500 MILLIGRAM(S): 250 TABLET, FILM COATED ORAL at 17:15

## 2021-09-24 NOTE — PROGRESS NOTE ADULT - ASSESSMENT
56F w/ reportedly no PMH with "DKA attack" in past, now presented to [facility] after found unresponsive in a pool of urine, found in DKA, treated in MICU, course complicated by seizure, during seizure workup found with new left occipital lesion, now s/p left occipital craniotomy for tumor resection POD#3  - Exam stable, +R homonymous hemianopsia        Plan  - Imaging reviewed  - Q2 neuro checks  - Pain control PRN  - Subgaleal LINDSEY removed  - MR brain w/wo contrast   - Decadron taper  - Keppra 500 Q12  - Melatonin 3 QHS  - Senna/miralax   - On CCB diet  - b/l SCDS; Lovenox 40 started today  - PT/OT/PMR following-- recommending AR  - D/w Dr. Levy

## 2021-09-24 NOTE — PROGRESS NOTE ADULT - SUBJECTIVE AND OBJECTIVE BOX
Patient seen this morning. No events overnight. She reports feeling better and more awake. Continues with blurred vision.    REVIEW OF SYSTEMS  Constitutional - No fever,  No fatigue  HEENT - No vertigo, No neck pain  Neurological - + blurred vision, No headaches, No memory loss, No loss of strength, No numbness, No tremors  Musculoskeletal - No joint pain, No joint swelling, No muscle pain  Psychiatric - No depression, No anxiety  All other systems were reviewed and were negative.    FUNCTIONAL PROGRESS  9/23 PT  Bed Mobility  Bed Mobility Training Rehab Potential: good, to achieve stated therapy goals  Bed Mobility Training Symptoms Noted During/After Treatment: dizziness  Bed Mobility Training Rolling/Turning: stand-by assist;  verbal cues;  nonverbal cues (demo/gestures);  bed rails  Bed Mobility Training Supine-to-Sit: stand-by assist;  nonverbal cues (demo/gestures);  verbal cues;  bed rails;  head of the bed elevated   Bed Mobility Training Limitations: decreased ability to use arms for pushing/pulling;  decreased ability to use legs for bridging/pushing;  decreased strength    Sit-Stand Transfer Training  Sit-to-Stand Transfer Training Rehab Potential: good, to achieve stated therapy goals  Sit-to-Stand Transfer Training Symptoms Noted During/After Treatment: dizziness  Transfer Training Sit-to-Stand Transfer: minimum assist (75% patient effort);  2 person assist;  nonverbal cues (demo/gestures);  verbal cues;  full weight-bearing   Solitario LE    hand held assist   Transfer Training Stand-to-Sit Transfer: minimum assist (75% patient effort);  1 person assist;  nonverbal cues (demo/gestures);  verbal cues;  full weight-bearing   hand held assist   Sit-to-Stand Transfer Training Transfer Safety Analysis: decreased strength;  impaired balance    Gait Training  Gait Training Rehab Potential: good, to achieve stated therapy goals  Gait Training Symptoms Noted During/After Treatment: dizziness  Gait Training: minimum assist (75% patient effort);  2 person assist;  nonverbal cues (demo/gestures);  verbal cues;  full weight-bearing   Solitario LE   hand held assist ;  5 feet  Gait Analysis: decreased hip/knee flexion;  decreased velocity of limb motion;  decreased step length;  decreased strength;  impaired balance    VITALS  T(C): 36.4 (09-24-21 @ 12:00), Max: 36.9 (09-23-21 @ 15:48)  HR: 58 (09-24-21 @ 12:00) (54 - 76)  BP: 130/80 (09-24-21 @ 12:00) (97/85 - 132/85)  RR: 18 (09-24-21 @ 12:00) (16 - 18)  SpO2: 94% (09-24-21 @ 12:00) (93% - 100%)  Wt(kg): --    MEDICATIONS   acetaminophen   Tablet .. 650 milliGRAM(s) every 6 hours PRN  benzocaine 15 mG/menthol 3.6 mG (Sugar-Free) Lozenge 1 Lozenge four times a day PRN  dexAMETHasone     Tablet     dexAMETHasone     Tablet 2 milliGRAM(s) every 8 hours  dextrose 40% Gel 15 Gram(s) once  dextrose 50% Injectable 25 Gram(s) once  dextrose 50% Injectable 12.5 Gram(s) once  dextrose 50% Injectable 25 Gram(s) once  famotidine    Tablet 20 milliGRAM(s) every 12 hours  glucagon  Injectable 1 milliGRAM(s) once  hydrALAZINE Injectable 10 milliGRAM(s) every 2 hours PRN  HYDROmorphone  Injectable 0.5 milliGRAM(s) every 4 hours PRN  insulin glargine Injectable (LANTUS) 30 Unit(s) at bedtime  insulin lispro (ADMELOG) corrective regimen sliding scale   three times a day before meals  insulin lispro Injectable (ADMELOG) 10 Unit(s) three times a day before meals  levETIRAcetam 500 milliGRAM(s) two times a day  melatonin 3 milliGRAM(s) at bedtime  nystatin Powder 1 Application(s) two times a day  oxyCODONE    IR 5 milliGRAM(s) every 4 hours PRN  oxyCODONE    IR 10 milliGRAM(s) every 4 hours PRN  phenol 1.4% (CHLORASEPTIC) Oral Spray 1 Spray(s) every 4 hours PRN  polyethylene glycol 3350 17 Gram(s) daily  senna 2 Tablet(s) at bedtime      RECENT LABS/IMAGING                          8.9    6.38  )-----------( 295      ( 24 Sep 2021 07:59 )             28.1     09-24    139  |  105  |  15.0  ----------------------------<  149<H>  4.5   |  24.0  |  0.70    Ca    8.9      24 Sep 2021 07:59  Phos  2.5     09-24  Mg     2.0     09-24    TPro  5.7<L>  /  Alb  3.3  /  TBili  0.3<L>  /  DBili  x   /  AST  61<H>  /  ALT  54<H>  /  AlkPhos  65  09-24    PT/INR - ( 24 Sep 2021 07:59 )   PT: 13.0 sec;   INR: 1.13 ratio         PTT - ( 24 Sep 2021 07:59 )  PTT:29.3 sec    CT head 9/7: No acute intracranial hemorrhage or mass effect.    CT cervical spine 9/7: No CT evidence of cervical spine fracture.    CT Chest 9/7: ET tube tip in the right mainstem bronchus; slight retraction is recommended.    MR Brain-Seizure, Epilepsy No Cont 9/13: Abnormal signal in the left parietotemporal lobe most likely represents an acute infarction. There is no hemorrhagic conversion. There is no midline shift or herniation. Additional differential diagnostic consideration is post seizure focus. Further correlation with MRI of the brain with contrast is suggested to exclude underlying lesion.    MR Angio Head 9/15: Corresponding to the abnormal signal seen in the previous MRI. There is a peripherally enhancing mass with satellite smaller lesions as described, most likely represents primary versus metastatic disease however, the former is more likely secondary to absence of large vasogenic edema. Tissue sampling is suggested. There is no substantial intracranial arterial stenosis or large vessel occlusion. There is no significant arterial stenosis in the neck or dissection.    CT Chest, Abdomen and Pelvis w/ Oral Cont and w/wo IV Cont 9/17: 5 mm lung nodule inseparable from left major fissure. This was not seen on the prior study although atelectatic changes were present. Trace left pleural fluid. 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 This can be confirmed with pelvic ultrasound.    MR Brain Stereotactic w/wo IV Cont 9/20: Unchanged left parieto-occipital enhancing mass.    CT Head 9/22:  1. Postsurgical changes from resection of a left parieto-occipital mass with intraparenchymal edema and blood products.  2. Small amount of postoperative subarachnoid and intraventricular hemorrhage is noted.  3. Thin postoperative subdural collection containing air and blood products measures up to approximately 4 mm.    Postoperative changes with surgical cavity is noted in the left parietal occipital lobe with surgical packing, postoperative fluid and blood components including foci of intraparenchymal hemorrhage in the left superior parietal lobe measuring 1.6 x 1.4 cm and smaller hemorrhage measuring 0.9 x 1 cm respectively. Trace amount of subdural hemorrhage is noted along the left parasagittal aspect of the falx with trace amount of hemorrhage layering in the posterior horn of the left lateral ventricle. There is surrounding mass effect. There is no midline shift or herniation.  Residual mass cannot be entirely excluded and follow-up with MRI of the brain with and without contrast is suggested in reasonable time upon resolution of blood products.    CT Head 9/23: No significant interval change    CT Head 9/24: Marginally improved recent perioperative findings without new abnormality      ----------------------------------------------------------------------------------------  PHYSICAL EXAM  Constitutional - NAD, Comfortable  HEENT - NCAT, EOMI, +drain  Neck - Supple, No limited ROM  Chest - Breathing comfortably, No wheezing  Cardiovascular - S1S2   Abdomen - Soft   Extremities - No C/C/E, No calf tenderness   Neurologic Exam -                    Cognitive - AAO to self, place, time, situation     Communication - Fluent     Cranial Nerves - right visual field deficits, patient able to compensate     Motor - No focal deficits                    LEFT    UE - ShAB 5/5, EF 5/5, EE 5/5, WE 5/5,  5/5                    RIGHT UE - ShAB 5/5, EF 5/5, EE 5/5, WE 5/5,  5/5                    LEFT    LE - HF 5/5, KE 5/5, DF 5/5, PF 5/5                    RIGHT LE - HF 5/5, KE 5/5, DF 5/5, PF 5/5        Sensory - Intact to LT  Psychiatric - Mood stable, Affect WNL  ----------------------------------------------------------------------------------------  ASSESSMENT/PLAN  56yFemale with functional deficits after undergoing craniotomy and resection of left occipital brain tumor  Brain Tumor - Decadron, Keppra  HTN - Hydralazine, Labetalol  DM - Lantus, Lispro  Pain - Tylenol, oxycodone, hydromorphone  DVT PPX - SCDs  Rehab -   Expect patient to achieve functional goals for discharge HOME with home care.  Will continue to follow. Functional progress will determine ongoing rehab dispo recommendations, which may change.  Continue bedside therapy as well as OOB throughout the day with mobilization by staff to maintain cardiopulmonary function and prevention of secondary complications related to debility.     Discussed with rehab team.

## 2021-09-24 NOTE — PROGRESS NOTE ADULT - SUBJECTIVE AND OBJECTIVE BOX
INTERVAL HPI/OVERNIGHT EVENTS:  56 year old Female with reportedly no PMH with "DKA attack" in past, now presented to [facility] after found unresponsive in a pool of urine, found in DKA, treated in MICU, course complicated by seizure, during seizure workup found with new left occipital lesion.  s/p left occipital craniotomy for tumor resection POD#3  Patient seen and examined this morning with neurosurgical team. Subgaleal LINDSEY removed this afternoon.   Patient was laid flat. Drain was taken off suction. Sterile dressing removed with dried blood noted. Incision clean, dry, intact without drainage or dehiscence noted. Subgaleal LINDSEY drain removed slowly with minimal drainage from site. 2 staples placed. Patient tolerated procedure well.     Vital Signs Last 24 Hrs  T(C): 36.5 (24 Sep 2021 14:59), Max: 36.9 (23 Sep 2021 15:48)  T(F): 97.7 (24 Sep 2021 14:59), Max: 98.4 (23 Sep 2021 15:48)  HR: 67 (24 Sep 2021 14:59) (54 - 72)  BP: 113/77 (24 Sep 2021 14:59) (97/85 - 132/85)  BP(mean): 89 (24 Sep 2021 14:59) (83 - 100)  RR: 18 (24 Sep 2021 14:59) (16 - 18)  SpO2: 94% (24 Sep 2021 14:59) (93% - 100%)    PHYSICAL EXAM:  GENERAL: NAD, well-groomed, well-developed  HEAD: S/p L craniotomy, incision dry, clean.   DRAIN: Subgaleal LINDSEY removed, closed with 2 staples  ZORAIDA COMA SCORE: E- 4 V- 5 M- 6=15  MENTAL STATUS: Awake, alert, oriented to self, hospital, September 2021; Appropriately conversant without aphasia; following commands  CRANIAL NERVES: Right homonymous hemianopsia. PERRL. EOMI without nystagmus. Facial sensation intact V1-3 distribution b/l. Face symmetric w/ normal eye closure and smile, tongue midline. Hearing grossly intact. Speech clear  MOTOR: strength 5/5 b/l upper and lower extremities, no drift  SENSATION: grossly intact to light touch all extremities  ABDOMEN: Nontender.   HEART: +S1/+S2    LABS:             8.9    6.38  )-----------( 295      ( 24 Sep 2021 07:59 )             28.1     09-24    139  |  105  |  15.0  ----------------------------<  149<H>  4.5   |  24.0  |  0.70    Ca    8.9      24 Sep 2021 07:59  Phos  2.5     09-24  Mg     2.0     09-24    TPro  5.7<L>  /  Alb  3.3  /  TBili  0.3<L>  /  DBili  x   /  AST  61<H>  /  ALT  54<H>  /  AlkPhos  65  09-24    PT/INR - ( 24 Sep 2021 07:59 )   PT: 13.0 sec;   INR: 1.13 ratio         PTT - ( 24 Sep 2021 07:59 )  PTT:29.3 sec      09-23 @ 07:01  -  09-24 @ 07:00  --------------------------------------------------------  IN: 615 mL / OUT: 1170 mL / NET: -555 mL    09-24 @ 07:01  -  09-24 @ 15:47  --------------------------------------------------------  IN: 240 mL / OUT: 420 mL / NET: -180 mL        RADIOLOGY & ADDITIONAL TESTS:  CT Head No Cont (09.24.21 @ 04:04)   IMPRESSION:  Marginally improved recent perioperative findings without new abnormality    CT Head No Cont (09.23.21 @ 03:55)  IMPRESSION:  No significant interval change    CT Head No Cont (09.22.21 @ 01:15)  IMPRESSION:  1. Postsurgical changes from resection of a left parieto-occipital mass with  intraparenchymal edema and blood products.  2. Small amount of postoperative subarachnoid and intraventricular hemorrhage  is noted.  3. Thin postoperative subdural collection containing air and blood products  measures up to approximately 4 mm.    MR Brain Stereotactic w/wo IV Cont (09.20.21 @ 13:22)   IMPRESSION:  Unchanged left parieto-occipital enhancing mass. INTERVAL HPI/OVERNIGHT EVENTS:  56 year old Female with reportedly no PMH with "DKA attack" in past, now presented to [facility] after found unresponsive in a pool of urine, found in DKA, treated in MICU, course complicated by seizure, during seizure workup found with new left occipital lesion.  s/p left occipital craniotomy for tumor resection POD#3  Patient seen and examined this morning with neurosurgical team. Subgaleal LINDSEY removed this afternoon. Drain was taken off suction. Sterile dressing removed with dried blood noted. Incision clean, dry, intact without drainage or dehiscence noted. Subgaleal LINDSEY drain removed slowly with minimal drainage from site. 2 staples placed. Patient tolerated procedure well.     Vital Signs Last 24 Hrs  T(C): 36.5 (24 Sep 2021 14:59), Max: 36.9 (23 Sep 2021 15:48)  T(F): 97.7 (24 Sep 2021 14:59), Max: 98.4 (23 Sep 2021 15:48)  HR: 67 (24 Sep 2021 14:59) (54 - 72)  BP: 113/77 (24 Sep 2021 14:59) (97/85 - 132/85)  BP(mean): 89 (24 Sep 2021 14:59) (83 - 100)  RR: 18 (24 Sep 2021 14:59) (16 - 18)  SpO2: 94% (24 Sep 2021 14:59) (93% - 100%)    PHYSICAL EXAM:  GENERAL: NAD, well-groomed, well-developed  HEAD: S/p L craniotomy, incision dry, clean.   DRAIN: Subgaleal LINDSEY removed, closed with 2 staples  ZORAIDA COMA SCORE: E- 4 V- 5 M- 6=15  MENTAL STATUS: Awake, alert, oriented to self, hospital, September 2021; Appropriately conversant without aphasia; following commands  CRANIAL NERVES: Right homonymous hemianopsia. PERRL. EOMI without nystagmus. Facial sensation intact V1-3 distribution b/l. Face symmetric w/ normal eye closure and smile, tongue midline. Hearing grossly intact. Speech clear  MOTOR: strength 5/5 b/l upper and lower extremities, no drift  SENSATION: grossly intact to light touch all extremities  ABDOMEN: Nontender.   HEART: +S1/+S2    LABS:             8.9    6.38  )-----------( 295      ( 24 Sep 2021 07:59 )             28.1     09-24    139  |  105  |  15.0  ----------------------------<  149<H>  4.5   |  24.0  |  0.70    Ca    8.9      24 Sep 2021 07:59  Phos  2.5     09-24  Mg     2.0     09-24    TPro  5.7<L>  /  Alb  3.3  /  TBili  0.3<L>  /  DBili  x   /  AST  61<H>  /  ALT  54<H>  /  AlkPhos  65  09-24    PT/INR - ( 24 Sep 2021 07:59 )   PT: 13.0 sec;   INR: 1.13 ratio         PTT - ( 24 Sep 2021 07:59 )  PTT:29.3 sec      09-23 @ 07:01  -  09-24 @ 07:00  --------------------------------------------------------  IN: 615 mL / OUT: 1170 mL / NET: -555 mL    09-24 @ 07:01  -  09-24 @ 15:47  --------------------------------------------------------  IN: 240 mL / OUT: 420 mL / NET: -180 mL        RADIOLOGY & ADDITIONAL TESTS:  CT Head No Cont (09.24.21 @ 04:04)   IMPRESSION:  Marginally improved recent perioperative findings without new abnormality    CT Head No Cont (09.23.21 @ 03:55)  IMPRESSION:  No significant interval change    CT Head No Cont (09.22.21 @ 01:15)  IMPRESSION:  1. Postsurgical changes from resection of a left parieto-occipital mass with  intraparenchymal edema and blood products.  2. Small amount of postoperative subarachnoid and intraventricular hemorrhage  is noted.  3. Thin postoperative subdural collection containing air and blood products  measures up to approximately 4 mm.    MR Brain Stereotactic w/wo IV Cont (09.20.21 @ 13:22)   IMPRESSION:  Unchanged left parieto-occipital enhancing mass.

## 2021-09-24 NOTE — PROGRESS NOTE ADULT - SUBJECTIVE AND OBJECTIVE BOX
Follow up: diabetes  Interval Hx/Events: s/p resection of brain mass on 9/21    MEDICATIONS  (STANDING):  dexAMETHasone     Tablet   Oral   dexAMETHasone     Tablet 2 milliGRAM(s) Oral every 8 hours  dextrose 40% Gel 15 Gram(s) Oral once  dextrose 50% Injectable 25 Gram(s) IV Push once  dextrose 50% Injectable 12.5 Gram(s) IV Push once  dextrose 50% Injectable 25 Gram(s) IV Push once  famotidine    Tablet 20 milliGRAM(s) Oral every 12 hours  glucagon  Injectable 1 milliGRAM(s) IntraMuscular once  insulin glargine Injectable (LANTUS) 30 Unit(s) SubCutaneous at bedtime  insulin lispro (ADMELOG) corrective regimen sliding scale   SubCutaneous three times a day before meals  insulin lispro Injectable (ADMELOG) 10 Unit(s) SubCutaneous three times a day before meals  levETIRAcetam 500 milliGRAM(s) Oral two times a day  melatonin 3 milliGRAM(s) Oral at bedtime  nystatin Powder 1 Application(s) Topical two times a day  polyethylene glycol 3350 17 Gram(s) Oral daily  senna 2 Tablet(s) Oral at bedtime    MEDICATIONS  (PRN):  acetaminophen   Tablet .. 650 milliGRAM(s) Oral every 6 hours PRN Mild Pain (1 - 3)  benzocaine 15 mG/menthol 3.6 mG (Sugar-Free) Lozenge 1 Lozenge Oral four times a day PRN Sore Throat  hydrALAZINE Injectable 10 milliGRAM(s) IV Push every 2 hours PRN SBP > 130  HYDROmorphone  Injectable 0.5 milliGRAM(s) IV Push every 4 hours PRN breakthrough pain  oxyCODONE    IR 5 milliGRAM(s) Oral every 4 hours PRN Moderate Pain (4 - 6)  oxyCODONE    IR 10 milliGRAM(s) Oral every 4 hours PRN Severe Pain (7 - 10)  phenol 1.4% (CHLORASEPTIC) Oral Spray 1 Spray(s) Topical every 4 hours PRN sore throat      ALLERGIES: No Known Allergies      REVIEW OF SYSTEMS: c/o headache, receiving pain medication. No other complains.      Vital Signs Last 24 Hrs  T(C): 36.4 (24 Sep 2021 12:00), Max: 36.9 (23 Sep 2021 15:48)  T(F): 97.5 (24 Sep 2021 12:00), Max: 98.4 (23 Sep 2021 15:48)  HR: 58 (24 Sep 2021 12:00) (54 - 76)  BP: 130/80 (24 Sep 2021 12:00) (97/85 - 132/85)  BP(mean): 96 (24 Sep 2021 12:00) (83 - 100)  RR: 18 (24 Sep 2021 12:00) (16 - 18)  SpO2: 94% (24 Sep 2021 12:00) (93% - 100%)    Physical Exam:  General appearance: Well developed.  Eyes: Pupils equal. EOMI  Neck: Trachea midline. No thyroid enlargement. No palpable thyroid nodules  Lungs: Normal respiratory excursion. Lungs clear no w/r/r  CV: Normal S1S2, regular. No m/r/g.   Abdomen: Soft, nontender, nondistended, (+) BS  Musculoskeletal: No cyanosis, clubbing. +B/L LE edema.  Skin: Warm and moist. No rash. No acanthosis. Feet: no ulcers  Psych: Normal affect, good judgement/insight      LABS:                        8.9    6.38  )-----------( 295      ( 24 Sep 2021 07:59 )             28.1     09-24    139  |  105  |  15.0  ----------------------------<  149<H>  4.5   |  24.0  |  0.70    Ca    8.9      24 Sep 2021 07:59  Phos  2.5     09-24  Mg     2.0     09-24    TPro  5.7<L>  /  Alb  3.3  /  TBili  0.3<L>  /  DBili  x   /  AST  61<H>  /  ALT  54<H>  /  AlkPhos  65  09-24    LIVER FUNCTIONS - ( 24 Sep 2021 07:59 )  Alb: 3.3 g/dL / Pro: 5.7 g/dL / ALK PHOS: 65 U/L / ALT: 54 U/L / AST: 61 U/L / GGT: x             A1C with Estimated Average Glucose Result: >16.9 % (09-09-21 @ 05:25)  A1C with Estimated Average Glucose Result: >16.9 % (09-09-21 @ 03:34)      CAPILLARY BLOOD GLUCOSE  POCT Blood Glucose.: 239 mg/dL (24 Sep 2021 11:56)  POCT Blood Glucose.: 145 mg/dL (24 Sep 2021 08:25)  POCT Blood Glucose.: 155 mg/dL (23 Sep 2021 23:38)  POCT Blood Glucose.: 192 mg/dL (23 Sep 2021 16:12)  POCT Blood Glucose.: 162 mg/dL (23 Sep 2021 11:17)  POCT Blood Glucose.: 99 mg/dL (23 Sep 2021 07:56)  POCT Blood Glucose.: 120 mg/dL (23 Sep 2021 06:31)  POCT Blood Glucose.: 147 mg/dL (23 Sep 2021 04:10)  POCT Blood Glucose.: 98 mg/dL (23 Sep 2021 00:34)  POCT Blood Glucose.: 87 mg/dL (22 Sep 2021 22:39)  POCT Blood Glucose.: 129 mg/dL (22 Sep 2021 20:41)  POCT Blood Glucose.: 127 mg/dL (22 Sep 2021 19:10)  POCT Blood Glucose.: 117 mg/dL (22 Sep 2021 18:07)  POCT Blood Glucose.: 95 mg/dL (22 Sep 2021 17:05)  POCT Blood Glucose.: 115 mg/dL (22 Sep 2021 15:04)      Triiodothyronine, Total (T3 Total): 44 ng/dL [80 - 200] (09-09-21)  Thyroid Stimulating Hormone, Serum: 1.26 uIU/mL [0.27 - 4.20] (09-09-21)  T4, Serum: 3.1 ug/dL [4.5 - 12.0] (09-09-21)  Thyroid Stimulating Hormone, Serum: 0.50 uIU/mL [0.27 - 4.20] (09-08-21)

## 2021-09-24 NOTE — PROGRESS NOTE ADULT - ASSESSMENT
56F w/ PMH of prediabetes was BIBA after being found unresponsive at home. She had agonal breathing and she was brought to ER where she was intubated and found to have severe DKA, now resolved after standard treatment. Found also to have brain mass.   Endocrine consult for diabetes management. A1c > 16.9%     1, T2DM- blood glucose well controlled.  -Continue Lantus 30 units daily.  -Continue Admelog 10 units AC.  -Continue Admelog sliding scale, 2:50 for BG >150.  -Continue FS AC and HS.  -Will need insulin while on steroids.  -Complete diabetes education, insulin pen use.    2. Brain mass- Likely primary brain tumor. s/p resection 9/21. on steroid taper

## 2021-09-25 LAB
GLUCOSE BLDC GLUCOMTR-MCNC: 100 MG/DL — HIGH (ref 70–99)
GLUCOSE BLDC GLUCOMTR-MCNC: 120 MG/DL — HIGH (ref 70–99)
GLUCOSE BLDC GLUCOMTR-MCNC: 131 MG/DL — HIGH (ref 70–99)
GLUCOSE BLDC GLUCOMTR-MCNC: 136 MG/DL — HIGH (ref 70–99)
GLUCOSE BLDC GLUCOMTR-MCNC: 166 MG/DL — HIGH (ref 70–99)

## 2021-09-25 PROCEDURE — 99232 SBSQ HOSP IP/OBS MODERATE 35: CPT

## 2021-09-25 RX ADMIN — LEVETIRACETAM 500 MILLIGRAM(S): 250 TABLET, FILM COATED ORAL at 18:32

## 2021-09-25 RX ADMIN — LEVETIRACETAM 500 MILLIGRAM(S): 250 TABLET, FILM COATED ORAL at 05:59

## 2021-09-25 RX ADMIN — ENOXAPARIN SODIUM 40 MILLIGRAM(S): 100 INJECTION SUBCUTANEOUS at 21:56

## 2021-09-25 RX ADMIN — Medication 2 MILLIGRAM(S): at 17:57

## 2021-09-25 RX ADMIN — INSULIN GLARGINE 30 UNIT(S): 100 INJECTION, SOLUTION SUBCUTANEOUS at 21:56

## 2021-09-25 RX ADMIN — Medication 10 UNIT(S): at 14:03

## 2021-09-25 RX ADMIN — FAMOTIDINE 20 MILLIGRAM(S): 10 INJECTION INTRAVENOUS at 17:56

## 2021-09-25 RX ADMIN — FAMOTIDINE 20 MILLIGRAM(S): 10 INJECTION INTRAVENOUS at 05:31

## 2021-09-25 RX ADMIN — Medication 2 MILLIGRAM(S): at 05:31

## 2021-09-25 RX ADMIN — NYSTATIN CREAM 1 APPLICATION(S): 100000 CREAM TOPICAL at 17:56

## 2021-09-25 NOTE — PROGRESS NOTE ADULT - SUBJECTIVE AND OBJECTIVE BOX
HPI: Pt is a 55 y/o female with reportedly no PMHx questionable "DKA attack" in the past per family was misdiagnosed presents to [facility] ED after being found unresponsive in pool of urine @ home yesterday. Pt reported last known normal in yesterday AM. On arrival by EMS, pt unresponsive, agonal, attempted intubation but was unsuccessful. BS in field >400. Brought to ED subsequently intubated. Labs revealing severe DKA w/ pH 6.9, serum co2 2, undetectable BS on f/s, 676 on BMP. Utox neg. CTH/chest unremarkable. ICU consulted.     Seen and examined in ED. Tachycardic, normotensive, saturating 100% on 50% fio2. On 60mcg propofol, 11u insulin.  (08 Sep 2021 01:22)      INTERVAL OVERNIGHT EVENTS: 9/25  56y Female  seen sitting  comfortably in a chair, OOB. . Tolerating diet. Voiding. Headaches much better today, still with blurry vision in the left eye     Vital Signs Last 24 Hrs  T(C): 36.8 (25 Sep 2021 08:00), Max: 37.1 (25 Sep 2021 04:00)  T(F): 98.2 (25 Sep 2021 08:00), Max: 98.7 (25 Sep 2021 04:00)  HR: 59 (25 Sep 2021 08:00) (52 - 80)  BP: 125/83 (25 Sep 2021 08:00) (106/70 - 130/80)  BP(mean): 97 (25 Sep 2021 08:00) (85 - 97)  RR: 18 (25 Sep 2021 08:00) (16 - 18)  SpO2: 97% (25 Sep 2021 08:00) (94% - 98%)    PHYSICAL EXAM:  GENERAL: NAD, well-groomed, well-developed female  HEAD:  Atraumatic, normocephalic  WOUND: Incision intact, no drainage, no erythema, non tender  MENTAL STATUS: AAO x3, Appropriately conversant without aphasia, following simple commands, right homonymous hemianopsia  CRANIAL NERVES: EOMI without nystagmus. Facial sensation intact V1-3 distribution b/l. Face symmetric w/ normal eye closure and smile, tongue midline. Hearing grossly intact. Speech clear. Head turning and shoulder shrug intact.   MOTOR: strength 5/5 b/l upper and lower extremities  SENSATION: grossly intact to light touch all extremities      LABS:                        8.9    6.38  )-----------( 295      ( 24 Sep 2021 07:59 )             28.1     09-24    139  |  105  |  15.0  ----------------------------<  149<H>  4.5   |  24.0  |  0.70    Ca    8.9      24 Sep 2021 07:59  Phos  2.5     09-24  Mg     2.0     09-24    TPro  5.7<L>  /  Alb  3.3  /  TBili  0.3<L>  /  DBili  x   /  AST  61<H>  /  ALT  54<H>  /  AlkPhos  65  09-24    PT/INR - ( 24 Sep 2021 07:59 )   PT: 13.0 sec;   INR: 1.13 ratio         PTT - ( 24 Sep 2021 07:59 )  PTT:29.3 sec      09-24 @ 07:01  -  09-25 @ 07:00  --------------------------------------------------------  IN: 240 mL / OUT: 420 mL / NET: -180 mL    09-25 @ 07:01  -  09-25 @ 09:56  --------------------------------------------------------  IN: 0 mL / OUT: 200 mL / NET: -200 mL        RADIOLOGY & ADDITIONAL TESTS:     CT Head No Cont (09.24.21 @ 04:04)   There has been left parietal craniotomy with subjacent, marginally improving perioperative hemorrhage and pneumocephalus in the surgical bed. There is persistent surrounding encephalomalacia with marginally improved vasogenic edema. There is persistent occipital horn effacement with mild stable intraventricular hemorrhage. Thereis no new hemorrhage or cortical edema.    IMPRESSION:  Marginally improved recent perioperative findings without new abnormality          CAPRINI SCORE [CLOT]:  Patient has an estimated Caprini score of greater than 5.  However, the patient's unique clinical situation will be addressed in an individual manner to determine appropriate anticoagulation treatment, if any.

## 2021-09-25 NOTE — PROGRESS NOTE ADULT - SUBJECTIVE AND OBJECTIVE BOX
Interval Events:  no overnight events  follow up on diabetes    patient seen and examined at bedside.  FS well controlled  no complaints    REVIEW OF SYSTEMS:    CONSTITUTIONAL: No fever, weight loss, or fatigue  EYES: No eye pain, visual disturbances, or discharge  ENMT:  No difficulty hearing, tinnitus, vertigo; No sinus or throat pain  NECK: No pain or stiffness  RESPIRATORY: No cough, wheezing, chills or hemoptysis; No shortness of breath  CARDIOVASCULAR: No chest pain, palpitations, dizziness, or leg swelling  GASTROINTESTINAL: No abdominal or epigastric pain. No nausea, vomiting, or hematemesis; No diarrhea or constipation. No melena or hematochezia.  NEUROLOGICAL: No headaches, memory loss, loss of strength, numbness, or tremors  SKIN: No itching, burning, rashes, or lesions   MUSCULOSKELETAL: No joint pain or swelling; No muscle, back, or extremity pain  PSYCHIATRIC: No depression, anxiety, mood swings, or difficulty sleeping        No Known Allergies      MEDICATIONS  (STANDING):  dexAMETHasone     Tablet   Oral   dexAMETHasone     Tablet 2 milliGRAM(s) Oral every 12 hours  dextrose 40% Gel 15 Gram(s) Oral once  dextrose 50% Injectable 25 Gram(s) IV Push once  dextrose 50% Injectable 12.5 Gram(s) IV Push once  dextrose 50% Injectable 25 Gram(s) IV Push once  enoxaparin Injectable 40 milliGRAM(s) SubCutaneous at bedtime  famotidine    Tablet 20 milliGRAM(s) Oral every 12 hours  glucagon  Injectable 1 milliGRAM(s) IntraMuscular once  insulin glargine Injectable (LANTUS) 30 Unit(s) SubCutaneous at bedtime  insulin lispro (ADMELOG) corrective regimen sliding scale   SubCutaneous three times a day before meals  insulin lispro Injectable (ADMELOG) 10 Unit(s) SubCutaneous three times a day before meals  levETIRAcetam 500 milliGRAM(s) Oral two times a day  melatonin 3 milliGRAM(s) Oral at bedtime  nystatin Powder 1 Application(s) Topical two times a day  polyethylene glycol 3350 17 Gram(s) Oral daily  senna 2 Tablet(s) Oral at bedtime    MEDICATIONS  (PRN):  acetaminophen   Tablet .. 650 milliGRAM(s) Oral every 6 hours PRN Mild Pain (1 - 3)  benzocaine 15 mG/menthol 3.6 mG (Sugar-Free) Lozenge 1 Lozenge Oral four times a day PRN Sore Throat  hydrALAZINE Injectable 10 milliGRAM(s) IV Push every 2 hours PRN SBP > 130  HYDROmorphone  Injectable 0.5 milliGRAM(s) IV Push every 4 hours PRN breakthrough pain  oxyCODONE    IR 5 milliGRAM(s) Oral every 4 hours PRN Moderate Pain (4 - 6)  oxyCODONE    IR 10 milliGRAM(s) Oral every 4 hours PRN Severe Pain (7 - 10)  phenol 1.4% (CHLORASEPTIC) Oral Spray 1 Spray(s) Topical every 4 hours PRN sore throat      Vital Signs Last 24 Hrs  T(C): 36.7 (25 Sep 2021 12:00), Max: 37.1 (25 Sep 2021 04:00)  T(F): 98 (25 Sep 2021 12:00), Max: 98.7 (25 Sep 2021 04:00)  HR: 70 (25 Sep 2021 12:00) (52 - 80)  BP: 127/85 (25 Sep 2021 12:00) (106/70 - 127/85)  BP(mean): 99 (25 Sep 2021 12:00) (85 - 99)  RR: 19 (25 Sep 2021 12:00) (16 - 19)  SpO2: 96% (25 Sep 2021 12:00) (94% - 98%)  Weight (kg): 119.7 (09-21-21 @ 07:53)    Physical Exam:    General appearance: Well developed.  Eyes: Pupils equal. EOMI  Neck: Trachea midline. No thyroid enlargement. No palpable thyroid nodules  Lungs: Normal respiratory excursion. Lungs clear no w/r/r  CV: Normal S1S2, regular. No m/r/g.   Abdomen: Soft, nontender, nondistended, (+) BS  Musculoskeletal: No cyanosis, clubbing. +B/L LE edema.  Skin: Warm and moist. No rash. No acanthosis. Feet: no ulcers  Psych: Normal affect, good judgement/insight    LABS  09-24    139  |  105  |  15.0  ----------------------------<  149<H>  4.5   |  24.0  |  0.70    Ca    8.9      24 Sep 2021 07:59  Phos  2.5     09-24  Mg     2.0     09-24    TPro  5.7<L>  /  Alb  3.3  /  TBili  0.3<L>  /  DBili  x   /  AST  61<H>  /  ALT  54<H>  /  AlkPhos  65  09-24                          8.9    6.38  )-----------( 295      ( 24 Sep 2021 07:59 )             28.1       A1C with Estimated Average Glucose Result: >16.9 % (09-09-21 @ 05:25)  Thyroid Stimulating Hormone, Serum: 1.26 uIU/mL (09-09-21 @ 05:23)  A1C with Estimated Average Glucose Result: >16.9 % (09-09-21 @ 03:34)  T4, Serum: 3.1 ug/dL (09-09-21 @ 03:34)  Thyroid Stimulating Hormone, Serum: 0.50 uIU/mL (09-08-21 @ 00:50)    Alanine Aminotransferase (ALT/SGPT): 54 U/L (09-24-21 @ 07:59)  Albumin, Serum: 3.3 g/dL (09-24-21 @ 07:59)  Aspartate Aminotransferase (AST/SGOT): 61 U/L (09-24-21 @ 07:59)  Alkaline Phosphatase, Serum: 65 U/L (09-24-21 @ 07:59)          CAPILLARY BLOOD GLUCOSE      POCT Blood Glucose.: 136 mg/dL (25 Sep 2021 13:32)  POCT Blood Glucose.: 131 mg/dL (25 Sep 2021 12:17)  POCT Blood Glucose.: 120 mg/dL (25 Sep 2021 08:09)  POCT Blood Glucose.: 129 mg/dL (24 Sep 2021 20:48)  POCT Blood Glucose.: 84 mg/dL (24 Sep 2021 17:36)

## 2021-09-25 NOTE — PROGRESS NOTE ADULT - ASSESSMENT
56F w/ reportedly no PMH with "DKA attack" in past, now presented to [facility] after found unresponsive in a pool of urine, found in DKA, treated in MICU, course complicated by seizure, during seizure workup found with new left occipital lesion, now s/p left occipital craniotomy for tumor resection POD#4  - Exam stable, +R homonymous hemianopsia      - Q2 neuro checks  - Pain control PRN  - MR brain w/wo contrast   - Decadron taper  - Keppra 500 Q12  - Melatonin 3 QHS  - Senna/miralax   - On CCB diet  - b/l SCDS; Lovenox 40 started today  - PT/OT/PMR following-- recommending AR

## 2021-09-26 LAB
GLUCOSE BLDC GLUCOMTR-MCNC: 115 MG/DL — HIGH (ref 70–99)
GLUCOSE BLDC GLUCOMTR-MCNC: 132 MG/DL — HIGH (ref 70–99)
GLUCOSE BLDC GLUCOMTR-MCNC: 86 MG/DL — SIGNIFICANT CHANGE UP (ref 70–99)
GLUCOSE BLDC GLUCOMTR-MCNC: 87 MG/DL — SIGNIFICANT CHANGE UP (ref 70–99)
SARS-COV-2 RNA SPEC QL NAA+PROBE: SIGNIFICANT CHANGE UP

## 2021-09-26 PROCEDURE — 99232 SBSQ HOSP IP/OBS MODERATE 35: CPT

## 2021-09-26 PROCEDURE — 70553 MRI BRAIN STEM W/O & W/DYE: CPT | Mod: 26

## 2021-09-26 RX ORDER — INSULIN GLARGINE 100 [IU]/ML
24 INJECTION, SOLUTION SUBCUTANEOUS AT BEDTIME
Refills: 0 | Status: DISCONTINUED | OUTPATIENT
Start: 2021-09-26 | End: 2021-09-27

## 2021-09-26 RX ORDER — INSULIN LISPRO 100/ML
6 VIAL (ML) SUBCUTANEOUS
Refills: 0 | Status: DISCONTINUED | OUTPATIENT
Start: 2021-09-26 | End: 2021-09-27

## 2021-09-26 RX ADMIN — LEVETIRACETAM 500 MILLIGRAM(S): 250 TABLET, FILM COATED ORAL at 17:18

## 2021-09-26 RX ADMIN — NYSTATIN CREAM 1 APPLICATION(S): 100000 CREAM TOPICAL at 17:18

## 2021-09-26 RX ADMIN — FAMOTIDINE 20 MILLIGRAM(S): 10 INJECTION INTRAVENOUS at 06:09

## 2021-09-26 RX ADMIN — Medication 6 UNIT(S): at 17:18

## 2021-09-26 RX ADMIN — LEVETIRACETAM 500 MILLIGRAM(S): 250 TABLET, FILM COATED ORAL at 06:10

## 2021-09-26 RX ADMIN — ENOXAPARIN SODIUM 40 MILLIGRAM(S): 100 INJECTION SUBCUTANEOUS at 21:32

## 2021-09-26 RX ADMIN — FAMOTIDINE 20 MILLIGRAM(S): 10 INJECTION INTRAVENOUS at 17:18

## 2021-09-26 RX ADMIN — Medication 2 MILLIGRAM(S): at 06:09

## 2021-09-26 NOTE — PROGRESS NOTE ADULT - ASSESSMENT
56F w/ PMH of prediabetes was BIBA after being found unresponsive at home. She had agonal breathing and she was brought to ER where she was intubated and found to have severe DKA, now resolved after standard treatment. Found also to have brain mass.   Endocrine consult for diabetes management. A1c > 16.9%     1, T2DM- blood glucose on the lower side probably due to lower doses of steroid  -decrease to Lantus 24 units daily.  -decrease to Admelog 6 units AC.  -Continue Admelog sliding scale, 2:50 for BG >150.  -Continue FS AC and HS.  -Will need insulin while on steroids.  -Complete diabetes education, insulin pen use.    2. Brain mass- Likely primary brain tumor. s/p resection 9/21. on steroid taper

## 2021-09-26 NOTE — PROGRESS NOTE ADULT - SUBJECTIVE AND OBJECTIVE BOX
Interval Events:  no overnight events  follow up on diabetes    patient seen and examined at bedside.  FS on the lower side  eating well  on steroid taper      REVIEW OF SYSTEMS:    CONSTITUTIONAL: No fever, weight loss, or fatigue  EYES: No eye pain, visual disturbances, or discharge  ENMT:  No difficulty hearing, tinnitus, vertigo; No sinus or throat pain  NECK: No pain or stiffness  RESPIRATORY: No cough, wheezing, chills or hemoptysis; No shortness of breath  CARDIOVASCULAR: No chest pain, palpitations, dizziness, or leg swelling  GASTROINTESTINAL: No abdominal or epigastric pain. No nausea, vomiting, or hematemesis; No diarrhea or constipation. No melena or hematochezia.  NEUROLOGICAL: No headaches, memory loss, loss of strength, numbness, or tremors  SKIN: No itching, burning, rashes, or lesions   MUSCULOSKELETAL: No joint pain or swelling; No muscle, back, or extremity pain  PSYCHIATRIC: No depression, anxiety, mood swings, or difficulty sleeping        No Known Allergies      MEDICATIONS  (STANDING):  dexAMETHasone     Tablet   Oral   dexAMETHasone     Tablet 2 milliGRAM(s) Oral daily  dextrose 40% Gel 15 Gram(s) Oral once  dextrose 50% Injectable 25 Gram(s) IV Push once  dextrose 50% Injectable 12.5 Gram(s) IV Push once  dextrose 50% Injectable 25 Gram(s) IV Push once  enoxaparin Injectable 40 milliGRAM(s) SubCutaneous at bedtime  famotidine    Tablet 20 milliGRAM(s) Oral every 12 hours  glucagon  Injectable 1 milliGRAM(s) IntraMuscular once  insulin glargine Injectable (LANTUS) 24 Unit(s) SubCutaneous at bedtime  insulin lispro (ADMELOG) corrective regimen sliding scale   SubCutaneous three times a day before meals  insulin lispro Injectable (ADMELOG) 6 Unit(s) SubCutaneous three times a day before meals  levETIRAcetam 500 milliGRAM(s) Oral two times a day  melatonin 3 milliGRAM(s) Oral at bedtime  nystatin Powder 1 Application(s) Topical two times a day  polyethylene glycol 3350 17 Gram(s) Oral daily  senna 2 Tablet(s) Oral at bedtime    MEDICATIONS  (PRN):  acetaminophen   Tablet .. 650 milliGRAM(s) Oral every 6 hours PRN Mild Pain (1 - 3)  benzocaine 15 mG/menthol 3.6 mG (Sugar-Free) Lozenge 1 Lozenge Oral four times a day PRN Sore Throat  hydrALAZINE Injectable 10 milliGRAM(s) IV Push every 2 hours PRN SBP > 130  HYDROmorphone  Injectable 0.5 milliGRAM(s) IV Push every 4 hours PRN breakthrough pain  oxyCODONE    IR 5 milliGRAM(s) Oral every 4 hours PRN Moderate Pain (4 - 6)  oxyCODONE    IR 10 milliGRAM(s) Oral every 4 hours PRN Severe Pain (7 - 10)  phenol 1.4% (CHLORASEPTIC) Oral Spray 1 Spray(s) Topical every 4 hours PRN sore throat      Vital Signs Last 24 Hrs  T(C): 36.3 (26 Sep 2021 12:00), Max: 36.8 (25 Sep 2021 20:00)  T(F): 97.3 (26 Sep 2021 12:00), Max: 98.3 (25 Sep 2021 20:00)  HR: 73 (26 Sep 2021 12:00) (53 - 73)  BP: 117/76 (26 Sep 2021 12:00) (105/65 - 131/78)  BP(mean): 90 (26 Sep 2021 12:00) (86 - 90)  RR: 18 (26 Sep 2021 12:00) (18 - 18)  SpO2: 94% (26 Sep 2021 12:00) (94% - 98%)  Weight (kg): 119.7 (09-21-21 @ 07:53)    Physical Exam:    General appearance: Well developed.  Eyes: Pupils equal. EOMI  Neck: Trachea midline. No thyroid enlargement. No palpable thyroid nodules  Lungs: Normal respiratory excursion. Lungs clear no w/r/r  CV: Normal S1S2, regular. No m/r/g.   Abdomen: Soft, nontender, nondistended, (+) BS  Musculoskeletal: No cyanosis, clubbing. +B/L LE edema.  Skin: Warm and moist. No rash. No acanthosis. Feet: no ulcers  Psych: Normal affect, good judgement/insight  LABS            A1C with Estimated Average Glucose Result: >16.9 % (09-09-21 @ 05:25)  Thyroid Stimulating Hormone, Serum: 1.26 uIU/mL (09-09-21 @ 05:23)  A1C with Estimated Average Glucose Result: >16.9 % (09-09-21 @ 03:34)  T4, Serum: 3.1 ug/dL (09-09-21 @ 03:34)  Thyroid Stimulating Hormone, Serum: 0.50 uIU/mL (09-08-21 @ 00:50)            CAPILLARY BLOOD GLUCOSE      POCT Blood Glucose.: 115 mg/dL (26 Sep 2021 12:37)  POCT Blood Glucose.: 86 mg/dL (26 Sep 2021 08:43)  POCT Blood Glucose.: 166 mg/dL (25 Sep 2021 21:54)  POCT Blood Glucose.: 100 mg/dL (25 Sep 2021 17:24)

## 2021-09-26 NOTE — PROGRESS NOTE ADULT - ASSESSMENT
53 Y/O female admitted after found unresponsive, MRI of the brain demonstrated a peripheral enhancing with satellite smaller lesion, pt s/p left occipital craniotomy for tumor resection POD#6.     -Pt seen and case discussed w/ Dr. Mancilla  -Pt went for MRI today, awaiting official read on imaging  -pain control as needed, avoid over sedation  -continue to work w/ PT, OOB to chair  -continue regular diet  -will continue to follow

## 2021-09-26 NOTE — PROGRESS NOTE ADULT - THIS PATIENT HAS THE FOLLOWING CONDITION(S)/DIAGNOSES ON THIS ADMISSION:
None
None
Cerebral Edema
Cerebral Edema
None
brain mass
None
brain mass
brain mass

## 2021-09-26 NOTE — PROGRESS NOTE ADULT - SUBJECTIVE AND OBJECTIVE BOX
HPI:  Pt is a 55 y/o female with reportedly no PMHx questionable "DKA attack" in the past per family was misdiagnosed presents to [facility] ED after being found unresponsive in pool of urine @ home yesterday. Pt reported last known normal in yesterday AM. On arrival by EMS, pt unresponsive, agonal, attempted intubation but was unsuccessful. BS in field >400. Brought to ED subsequently intubated. Labs revealing severe DKA w/ pH 6.9, serum co2 2, undetectable BS on f/s, 676 on BMP. Utox neg. CTH/chest unremarkable. ICU consulted.   Seen and examined in ED. Tachycardic, normotensive, saturating 100% on 50% fio2. On 60mcg propofol, 11u insulin.  (08 Sep 2021 01:22)    INTERVAL HPI/OVERNIGHT EVENTS:  Pt seen and evaluated while sitting in chair this morning. Pt denies HA, N/V, dizziness.       Vital Signs Last 24 Hrs  T(C): 36.3 (26 Sep 2021 12:00), Max: 36.8 (25 Sep 2021 20:00)  T(F): 97.3 (26 Sep 2021 12:00), Max: 98.3 (25 Sep 2021 20:00)  HR: 56 (26 Sep 2021 16:00) (53 - 73)  BP: 117/76 (26 Sep 2021 12:00) (105/65 - 131/78)  BP(mean): 90 (26 Sep 2021 12:00) (86 - 90)  RR: 18 (26 Sep 2021 16:00) (18 - 18)  SpO2: 94% (26 Sep 2021 12:00) (94% - 98%)      PHYSICAL EXAM:  GENERAL: NAD, well-groomed, well-developed  HEAD:  Atraumatic, normocephalic  DRAINS:   WOUND: Dressing clean dry intact  MENTAL STATUS: AAO x3; Awake/Comatose; Opens eyes spontaneously/to voice/to light touch/to noxious stimuli; Appropriately conversant without aphasia/Nonverbal; following simple commands/mimicking/not following commands  CRANIAL NERVES: PERRL; EOMI; corneals intact b/l; Dolls sign positive; blinks to threat b/l; no facial asymmetry; facial sensation grossly intact to light touch b/l;  tongue midline; palate rises symmetrically; gag reflex intact  MOTOR: strength 5/5 B/L upper and lower extremities; sensation grossly intact all extremities; DTRs 2+ intact and symmetric; negative Gilliland's b/l; negative clonus b/l  CHEST/LUNG: Clear to auscultation bilaterally; no rales, rhonchi, wheezing, or rubs  HEART: +S1/+S2; Regular rate and rhythm; no murmurs, rubs, or gallops  ABDOMEN: Soft, nontender, nondistended; bowel sounds present all four quadrants  EXTREMITIES:  2+ peripheral pulses, no clubbing, cyanosis, or edema  SKIN: Warm, dry; no rashes or lesions      LABS:      09-25 @ 07:01  -  09-26 @ 07:00  --------------------------------------------------------  IN: 0 mL / OUT: 600 mL / NET: -600 mL         HPI:  Pt is a 55 y/o female with reportedly no PMHx questionable "DKA attack" in the past per family was misdiagnosed presents to [facility] ED after being found unresponsive in pool of urine @ home yesterday. Pt reported last known normal in yesterday AM. On arrival by EMS, pt unresponsive, agonal, attempted intubation but was unsuccessful. BS in field >400. Brought to ED subsequently intubated. Labs revealing severe DKA w/ pH 6.9, serum co2 2, undetectable BS on f/s, 676 on BMP. Utox neg. CTH/chest unremarkable. ICU consulted.   Seen and examined in ED. Tachycardic, normotensive, saturating 100% on 50% fio2. On 60mcg propofol, 11u insulin.  (08 Sep 2021 01:22)    INTERVAL HPI/OVERNIGHT EVENTS:  Pt seen and evaluated while sitting in chair this morning. Pt denies HA, N/V, dizziness.       Vital Signs Last 24 Hrs  T(C): 36.3 (26 Sep 2021 12:00), Max: 36.8 (25 Sep 2021 20:00)  T(F): 97.3 (26 Sep 2021 12:00), Max: 98.3 (25 Sep 2021 20:00)  HR: 56 (26 Sep 2021 16:00) (53 - 73)  BP: 117/76 (26 Sep 2021 12:00) (105/65 - 131/78)  BP(mean): 90 (26 Sep 2021 12:00) (86 - 90)  RR: 18 (26 Sep 2021 16:00) (18 - 18)  SpO2: 94% (26 Sep 2021 12:00) (94% - 98%)      PHYSICAL EXAM:  GENERAL: NAD, well-groomed, well-developed female  HEAD:  Atraumatic, normocephalic  WOUND: Incision intact, no drainage, no erythema, non tender  MENTAL STATUS: AAO x3, Appropriately conversant without aphasia, following simple commands, right homonymous hemianopsia  CRANIAL NERVES: EOMI without nystagmus. Facial sensation intact V1-3 distribution b/l. Face symmetric w/ normal eye closure and smile, tongue midline. Hearing grossly intact. Speech clear. Head turning and shoulder shrug intact.   MOTOR: strength 5/5 b/l upper and lower extremities  SENSATION: grossly intact to light touch all extremities      LABS:      09-25 @ 07:01  -  09-26 @ 07:00  --------------------------------------------------------  IN: 0 mL / OUT: 600 mL / NET: -600 mL

## 2021-09-26 NOTE — PROGRESS NOTE ADULT - NSPROGADDITIONALINFOA_GEN_ALL_CORE
NSGY Attg:    see above    patient seen and examined by PA staff    agree with exam and plan as above  MRI in progress

## 2021-09-27 ENCOUNTER — TRANSCRIPTION ENCOUNTER (OUTPATIENT)
Age: 56
End: 2021-09-27

## 2021-09-27 VITALS
DIASTOLIC BLOOD PRESSURE: 74 MMHG | SYSTOLIC BLOOD PRESSURE: 120 MMHG | TEMPERATURE: 98 F | OXYGEN SATURATION: 97 % | HEART RATE: 63 BPM | RESPIRATION RATE: 17 BRPM

## 2021-09-27 LAB
GLUCOSE BLDC GLUCOMTR-MCNC: 116 MG/DL — HIGH (ref 70–99)
GLUCOSE BLDC GLUCOMTR-MCNC: 79 MG/DL — SIGNIFICANT CHANGE UP (ref 70–99)

## 2021-09-27 PROCEDURE — 70548 MR ANGIOGRAPHY NECK W/DYE: CPT

## 2021-09-27 PROCEDURE — 95714 VEEG EA 12-26 HR UNMNTR: CPT

## 2021-09-27 PROCEDURE — 83550 IRON BINDING TEST: CPT

## 2021-09-27 PROCEDURE — 83880 ASSAY OF NATRIURETIC PEPTIDE: CPT

## 2021-09-27 PROCEDURE — 82550 ASSAY OF CK (CPK): CPT

## 2021-09-27 PROCEDURE — 70450 CT HEAD/BRAIN W/O DYE: CPT

## 2021-09-27 PROCEDURE — 85610 PROTHROMBIN TIME: CPT

## 2021-09-27 PROCEDURE — 82607 VITAMIN B-12: CPT

## 2021-09-27 PROCEDURE — 70551 MRI BRAIN STEM W/O DYE: CPT

## 2021-09-27 PROCEDURE — 82955 ASSAY OF G6PD ENZYME: CPT

## 2021-09-27 PROCEDURE — 82010 KETONE BODYS QUAN: CPT

## 2021-09-27 PROCEDURE — 71260 CT THORAX DX C+: CPT

## 2021-09-27 PROCEDURE — 86803 HEPATITIS C AB TEST: CPT

## 2021-09-27 PROCEDURE — 83605 ASSAY OF LACTIC ACID: CPT

## 2021-09-27 PROCEDURE — 84484 ASSAY OF TROPONIN QUANT: CPT

## 2021-09-27 PROCEDURE — 76377 3D RENDER W/INTRP POSTPROCES: CPT

## 2021-09-27 PROCEDURE — 88360 TUMOR IMMUNOHISTOCHEM/MANUAL: CPT

## 2021-09-27 PROCEDURE — 97530 THERAPEUTIC ACTIVITIES: CPT

## 2021-09-27 PROCEDURE — 88331 PATH CONSLTJ SURG 1 BLK 1SPC: CPT

## 2021-09-27 PROCEDURE — 86850 RBC ANTIBODY SCREEN: CPT

## 2021-09-27 PROCEDURE — 97116 GAIT TRAINING THERAPY: CPT

## 2021-09-27 PROCEDURE — U0003: CPT

## 2021-09-27 PROCEDURE — 36430 TRANSFUSION BLD/BLD COMPNT: CPT

## 2021-09-27 PROCEDURE — 96374 THER/PROPH/DIAG INJ IV PUSH: CPT

## 2021-09-27 PROCEDURE — 82746 ASSAY OF FOLIC ACID SERUM: CPT

## 2021-09-27 PROCEDURE — 36600 WITHDRAWAL OF ARTERIAL BLOOD: CPT

## 2021-09-27 PROCEDURE — 88341 IMHCHEM/IMCYTCHM EA ADD ANTB: CPT

## 2021-09-27 PROCEDURE — 99233 SBSQ HOSP IP/OBS HIGH 50: CPT | Mod: GC

## 2021-09-27 PROCEDURE — C9399: CPT

## 2021-09-27 PROCEDURE — 74178 CT ABD&PLV WO CNTR FLWD CNTR: CPT

## 2021-09-27 PROCEDURE — 80307 DRUG TEST PRSMV CHEM ANLYZR: CPT

## 2021-09-27 PROCEDURE — 36415 COLL VENOUS BLD VENIPUNCTURE: CPT

## 2021-09-27 PROCEDURE — 70553 MRI BRAIN STEM W/O & W/DYE: CPT

## 2021-09-27 PROCEDURE — 94003 VENT MGMT INPAT SUBQ DAY: CPT

## 2021-09-27 PROCEDURE — 93005 ELECTROCARDIOGRAM TRACING: CPT

## 2021-09-27 PROCEDURE — 93306 TTE W/DOPPLER COMPLETE: CPT

## 2021-09-27 PROCEDURE — C1769: CPT

## 2021-09-27 PROCEDURE — 84466 ASSAY OF TRANSFERRIN: CPT

## 2021-09-27 PROCEDURE — 88342 IMHCHEM/IMCYTCHM 1ST ANTB: CPT

## 2021-09-27 PROCEDURE — 96372 THER/PROPH/DIAG INJ SC/IM: CPT

## 2021-09-27 PROCEDURE — 70552 MRI BRAIN STEM W/DYE: CPT

## 2021-09-27 PROCEDURE — 84132 ASSAY OF SERUM POTASSIUM: CPT

## 2021-09-27 PROCEDURE — 84443 ASSAY THYROID STIM HORMONE: CPT

## 2021-09-27 PROCEDURE — 87086 URINE CULTURE/COLONY COUNT: CPT

## 2021-09-27 PROCEDURE — P9100: CPT

## 2021-09-27 PROCEDURE — 82435 ASSAY OF BLOOD CHLORIDE: CPT

## 2021-09-27 PROCEDURE — 85045 AUTOMATED RETICULOCYTE COUNT: CPT

## 2021-09-27 PROCEDURE — 88307 TISSUE EXAM BY PATHOLOGIST: CPT

## 2021-09-27 PROCEDURE — 82330 ASSAY OF CALCIUM: CPT

## 2021-09-27 PROCEDURE — 71045 X-RAY EXAM CHEST 1 VIEW: CPT

## 2021-09-27 PROCEDURE — 84702 CHORIONIC GONADOTROPIN TEST: CPT

## 2021-09-27 PROCEDURE — 80048 BASIC METABOLIC PNL TOTAL CA: CPT

## 2021-09-27 PROCEDURE — 84100 ASSAY OF PHOSPHORUS: CPT

## 2021-09-27 PROCEDURE — 82803 BLOOD GASES ANY COMBINATION: CPT

## 2021-09-27 PROCEDURE — 85730 THROMBOPLASTIN TIME PARTIAL: CPT

## 2021-09-27 PROCEDURE — 83735 ASSAY OF MAGNESIUM: CPT

## 2021-09-27 PROCEDURE — 86900 BLOOD TYPING SEROLOGIC ABO: CPT

## 2021-09-27 PROCEDURE — 83036 HEMOGLOBIN GLYCOSYLATED A1C: CPT

## 2021-09-27 PROCEDURE — 86901 BLOOD TYPING SEROLOGIC RH(D): CPT

## 2021-09-27 PROCEDURE — 99285 EMERGENCY DEPT VISIT HI MDM: CPT | Mod: 25

## 2021-09-27 PROCEDURE — 96375 TX/PRO/DX INJ NEW DRUG ADDON: CPT

## 2021-09-27 PROCEDURE — 0031A: CPT

## 2021-09-27 PROCEDURE — 85025 COMPLETE CBC W/AUTO DIFF WBC: CPT

## 2021-09-27 PROCEDURE — 83540 ASSAY OF IRON: CPT

## 2021-09-27 PROCEDURE — 85014 HEMATOCRIT: CPT

## 2021-09-27 PROCEDURE — 84480 ASSAY TRIIODOTHYRONINE (T3): CPT

## 2021-09-27 PROCEDURE — 84681 ASSAY OF C-PEPTIDE: CPT

## 2021-09-27 PROCEDURE — 82947 ASSAY GLUCOSE BLOOD QUANT: CPT

## 2021-09-27 PROCEDURE — 81001 URINALYSIS AUTO W/SCOPE: CPT

## 2021-09-27 PROCEDURE — 94002 VENT MGMT INPAT INIT DAY: CPT

## 2021-09-27 PROCEDURE — 84436 ASSAY OF TOTAL THYROXINE: CPT

## 2021-09-27 PROCEDURE — 80053 COMPREHEN METABOLIC PANEL: CPT

## 2021-09-27 PROCEDURE — U0005: CPT

## 2021-09-27 PROCEDURE — 84295 ASSAY OF SERUM SODIUM: CPT

## 2021-09-27 PROCEDURE — 85018 HEMOGLOBIN: CPT

## 2021-09-27 PROCEDURE — 82962 GLUCOSE BLOOD TEST: CPT

## 2021-09-27 PROCEDURE — 97166 OT EVAL MOD COMPLEX 45 MIN: CPT

## 2021-09-27 PROCEDURE — C1763: CPT

## 2021-09-27 PROCEDURE — 97163 PT EVAL HIGH COMPLEX 45 MIN: CPT

## 2021-09-27 PROCEDURE — 95711 VEEG 2-12 HR UNMONITORED: CPT

## 2021-09-27 PROCEDURE — P9037: CPT

## 2021-09-27 PROCEDURE — C1889: CPT

## 2021-09-27 PROCEDURE — 86769 SARS-COV-2 COVID-19 ANTIBODY: CPT

## 2021-09-27 PROCEDURE — 71250 CT THORAX DX C-: CPT | Mod: MD

## 2021-09-27 PROCEDURE — 70544 MR ANGIOGRAPHY HEAD W/O DYE: CPT

## 2021-09-27 PROCEDURE — 87040 BLOOD CULTURE FOR BACTERIA: CPT

## 2021-09-27 PROCEDURE — 72125 CT NECK SPINE W/O DYE: CPT

## 2021-09-27 PROCEDURE — C1713: CPT

## 2021-09-27 PROCEDURE — 0225U NFCT DS DNA&RNA 21 SARSCOV2: CPT

## 2021-09-27 PROCEDURE — 95700 EEG CONT REC W/VID EEG TECH: CPT

## 2021-09-27 PROCEDURE — 86140 C-REACTIVE PROTEIN: CPT

## 2021-09-27 PROCEDURE — 85027 COMPLETE CBC AUTOMATED: CPT

## 2021-09-27 RX ORDER — LEVETIRACETAM 250 MG/1
1 TABLET, FILM COATED ORAL
Qty: 60 | Refills: 0
Start: 2021-09-27

## 2021-09-27 RX ORDER — OXYCODONE HYDROCHLORIDE 5 MG/1
1 TABLET ORAL
Qty: 25 | Refills: 0
Start: 2021-09-27

## 2021-09-27 RX ORDER — ACETAMINOPHEN 500 MG
1000 TABLET ORAL ONCE
Refills: 0 | Status: DISCONTINUED | OUTPATIENT
Start: 2021-09-27 | End: 2021-09-27

## 2021-09-27 RX ORDER — INSULIN LISPRO 100/ML
4 VIAL (ML) SUBCUTANEOUS
Qty: 45 | Refills: 0
Start: 2021-09-27 | End: 2021-10-26

## 2021-09-27 RX ORDER — ENOXAPARIN SODIUM 100 MG/ML
16 INJECTION SUBCUTANEOUS
Qty: 20 | Refills: 0
Start: 2021-09-27 | End: 2021-10-26

## 2021-09-27 RX ADMIN — Medication 6 UNIT(S): at 12:09

## 2021-09-27 RX ADMIN — FAMOTIDINE 20 MILLIGRAM(S): 10 INJECTION INTRAVENOUS at 05:55

## 2021-09-27 RX ADMIN — LEVETIRACETAM 500 MILLIGRAM(S): 250 TABLET, FILM COATED ORAL at 05:55

## 2021-09-27 RX ADMIN — Medication 2 MILLIGRAM(S): at 05:56

## 2021-09-27 RX ADMIN — Medication 6 UNIT(S): at 08:23

## 2021-09-27 NOTE — PROGRESS NOTE ADULT - PROVIDER SPECIALTY LIST ADULT
Brain Injury Medicine
Critical Care
Endocrinology
Internal Medicine
Neurology
Neurology
Neurosurgery
Brain Injury Medicine
Critical Care
Endocrinology
Hospitalist
Hospitalist
NSICU
Neurology
Neurology
Neurosurgery
Endocrinology
Epilepsy
Epilepsy
Hospitalist
Neurology
Neurosurgery
Neurosurgery
Endocrinology
Epilepsy
Epilepsy
Hospitalist
NSICU
Neurosurgery
Neurosurgery
Epilepsy
Neurosurgery
Neurosurgery
NSICU

## 2021-09-27 NOTE — DISCHARGE NOTE NURSING/CASE MANAGEMENT/SOCIAL WORK - PATIENT PORTAL LINK FT
You can access the FollowMyHealth Patient Portal offered by [facility] by registering at the following website: [email or web address]. By joining [email or web address]’s FollowMyHealth portal, you will also be able to view your health information using other applications (apps) compatible with our system.

## 2021-09-27 NOTE — PROGRESS NOTE ADULT - REASON FOR ADMISSION
DKA, seizure
Unresponsive, intubated
Unresponsive
Unresponsive, intubated
DKA, seizure
Unresponsive, intubated
brain mass
Unresponsive, intubated

## 2021-09-27 NOTE — PROGRESS NOTE ADULT - ATTENDING COMMENTS
DC home today.  Patient seen and examined, discussed patient with Dr. Walter and agree with recommendations.
Patient seen and examined, discussed patient with Dr. Walter and agree with recommendations.
patient seen and examined. plan discussed with NP and changes incorporated in the note.    has good glycemic control  no changes
patient seen and examined. plan discussed with NP and changes incorporated in the note.    patient doing better than yesterday  up and eating  already downgraded to floors  likely on steroids after discharge so will need insulin
I had an extensive discussion with the pt and her family and I explained in details the procedure and all the alternatives treatments. I went through the goals and the risks of the procedure including but not limited to infections, post op bleeding, seizures, hemianopsia, risk of stroke, arm and leg weakness, DVT, PE, MI, death. The pt accepted the risks and agreed to surgery. The health care proxy signed the consent.

## 2021-09-27 NOTE — CHART NOTE - NSCHARTNOTEFT_GEN_A_CORE
Drain was taken off suction. Sterile dressing removed with dried blood noted. Incision clean, dry, intact without drainage or dehiscence noted. Subgaleal LINDSEY drain removed slowly with minimal drainage from site. 2 staples placed. Patient tolerated procedure well.
Source: Patient [ ]  Family [ ]   other [x] EMR; RN    Current Diet:   Diet, NPO after Midnight:      NPO Start Date: 20-Sep-2021,   NPO Start Time: 23:59  Except Medications (09-20-21 @ 10:37)  Diet, Consistent Carbohydrate w/Evening Snack:   Supplement Feeding Modality:  Oral  Glucerna Shake Cans or Servings Per Day:  1       Frequency:  Two Times a day (09-17-21 @ 17:02)    Patient reports [ ] nausea  [ ] vomiting [ ] diarrhea [ ] constipation  [ ]chewing problems [ ] swallowing issues  [ ] other:     PO intake:  < 50% [ ]   50-75%  [ ]   %  [x]  other :    Source for PO intake [ ] Patient [ ] family [x] chart [x] staff [ ] other    Current Weight:   (9/10)  286.8 lbs  (9/9)   277.3 lbs    % Weight Change: No recent weight documented     Pertinent Medications: MEDICATIONS  (STANDING):  aspirin enteric coated 81 milliGRAM(s) Oral daily  benzocaine 15 mG/menthol 3.6 mG (Sugar-Free) Lozenge 1 Lozenge Oral four times a day  dextrose 40% Gel 15 Gram(s) Oral once  dextrose 50% Injectable 25 Gram(s) IV Push once  dextrose 50% Injectable 12.5 Gram(s) IV Push once  dextrose 50% Injectable 25 Gram(s) IV Push once  glucagon  Injectable 1 milliGRAM(s) IntraMuscular once  heparin   Injectable 5000 Unit(s) SubCutaneous every 8 hours  insulin glargine Injectable (LANTUS) 25 Unit(s) SubCutaneous every morning  insulin lispro (ADMELOG) corrective regimen sliding scale   SubCutaneous Before meals and at bedtime  insulin lispro Injectable (ADMELOG) 5 Unit(s) SubCutaneous three times a day before meals  levETIRAcetam 500 milliGRAM(s) Oral two times a day  melatonin 3 milliGRAM(s) Oral at bedtime  nystatin Powder 1 Application(s) Topical two times a day    MEDICATIONS  (PRN):  phenol 1.4% (CHLORASEPTIC) Oral Spray 1 Spray(s) Topical every 4 hours PRN sore throat    Pertinent Labs: CBC Full  -  ( 20 Sep 2021 13:28 )  WBC Count : 7.02 K/uL  RBC Count : 3.73 M/uL  Hemoglobin : 11.4 g/dL  Hematocrit : 36.5 %  Platelet Count - Automated : 351 K/uL  Mean Cell Volume : 97.9 fl  Mean Cell Hemoglobin : 30.6 pg  Mean Cell Hemoglobin Concentration : 31.2 gm/dL  09-20 Na137 mmol/L Glu 97 mg/dL K+ 4.3 mmol/L Cr  0.93 mg/dL BUN 12.0 mg/dL Phos n/a   Alb 3.6 g/dL PAB n/a       Skin: Moisture Associated Dermatitis     Nutrition focused physical exam conducted - found signs of malnutrition [x]absent [ ]present    Subcutaneous fat loss: [ ] Orbital fat pads region, [ ]Buccal fat region, [ ]Triceps region,  [ ]Ribs region    Muscle wasting: [ ]Temples region, [ ]Clavicle region, [ ]Shoulder region, [ ]Scapula region, [ ]Interosseous region,  [ ]thigh region, [ ]Calf region    Estimated Needs:   [x] no change since previous assessment  [ ] recalculated:     Current Nutrition Diagnosis: Pt remains at nutrition risk secondary to increased nutrient needs related to increased physiological demand for nutrient as evidenced by lesion L. frontoparietal region. Pt scheduled for L. crani for tumor resection tomorrow. Pt with good PO intake completing 100% of meals, BG . Aware BUN decreased, no recent reports of edema, will continue to monitor.     Recommendations:   1) When medically feasible resume diet with Glucerna BID   2) Rx MVI daily   3) Monitor weights daily for trend/accuracy   4) Encourage HBV protein sources     Monitoring and Evaluation:   [x] PO intake [ ] Tolerance to diet prescription [X] Weights  [X] Follow up per protocol [X] Labs:
Nutrition Note: Aware pt being discharged today.  Discussed cons cho meal plan and reviewed diabetes self management education.  All questions answered; pt appeared highly motivated to improve glycemic control and achieve gradual wt loss.  RD to remain available.
Patient to receive [place] COVID-19 vaccination today.    Patient was informed that the patient was identified as a candidate for the vaccine based on their medical history.     It was confirmed that the patient has not had a previous COVID-19 vaccination nor been treated with monoclonal antibiotics in the past 90 days. The patient has not had a previous anaphylaxis reaction to past vaccines. No medication anaphylaxis reaction was identified.    The patient was informed of risks, benefits and alternatives to receiving the [place] COVID-19 vaccine. The patient was agreeable and consented to receive the vaccine. The CDC COVID-19 checklist was reviewed with the patient by myself.     The patient was provided the V-SAFE information sheets and the [place] COVID-19 Fact Sheets for review.    The patient signed the [facility] vaccination consent form after asking all questions and receiving satisfactory answers regarding the [place] COVID-19 vaccination.
Upon arrival to ICU  Pt w/ visible tonic clonic seizures  ~5 mins, cessation after 4mg IV versed  Seized while on 60mcg Propofol gtt  Added versed gtt  Will load with keppra, continue 1g BID  Will need epilepsy consult  cEEG already ordered, f/u

## 2021-09-27 NOTE — DISCHARGE NOTE NURSING/CASE MANAGEMENT/SOCIAL WORK - NSDCVIVACCINE_GEN_ALL_CORE_FT
COVID-19 vaccine, vector-nr, [id number], PF, 0.5 mL (Marlon); 13-Sep-2021 13:19; Kamilah Kim (RN); Marlon; [id number] (Exp. Date: 21-Sep-2021); IntraMuscular; Deltoid Left.; 0.5 milliLiter(s);

## 2021-09-27 NOTE — PROGRESS NOTE ADULT - SUBJECTIVE AND OBJECTIVE BOX
Patient seen this morning. No events overnight. She reports continued blurred vision but is able to compensate by closing her right eye. Patient is for discharge today.    REVIEW OF SYSTEMS  Constitutional - No fever,  No fatigue  HEENT - No vertigo, No neck pain  Neurological - + blurry vision, No headaches, No memory loss, No loss of strength, No numbness, No tremors  Musculoskeletal - No joint pain, No joint swelling, No muscle pain  Psychiatric - No depression, No anxiety  All other systems were reviewed and were negative.    FUNCTIONAL PROGRESS  9/23 PT    Bed Mobility  Bed Mobility Training Rehab Potential: good, to achieve stated therapy goals  Bed Mobility Training Symptoms Noted During/After Treatment: dizziness  Bed Mobility Training Rolling/Turning: stand-by assist;  verbal cues;  nonverbal cues (demo/gestures);  bed rails  Bed Mobility Training Supine-to-Sit: stand-by assist;  nonverbal cues (demo/gestures);  verbal cues;  bed rails;  head of the bed elevated   Bed Mobility Training Limitations: decreased ability to use arms for pushing/pulling;  decreased ability to use legs for bridging/pushing;  decreased strength    Sit-Stand Transfer Training  Sit-to-Stand Transfer Training Rehab Potential: good, to achieve stated therapy goals  Sit-to-Stand Transfer Training Symptoms Noted During/After Treatment: dizziness  Transfer Training Sit-to-Stand Transfer: minimum assist (75% patient effort);  2 person assist;  nonverbal cues (demo/gestures);  verbal cues;  full weight-bearing   Solitario LE    hand held assist   Transfer Training Stand-to-Sit Transfer: minimum assist (75% patient effort);  1 person assist;  nonverbal cues (demo/gestures);  verbal cues;  full weight-bearing   hand held assist   Sit-to-Stand Transfer Training Transfer Safety Analysis: decreased strength;  impaired balance    Gait Training  Gait Training Rehab Potential: good, to achieve stated therapy goals  Gait Training Symptoms Noted During/After Treatment: dizziness  Gait Training: minimum assist (75% patient effort);  2 person assist;  nonverbal cues (demo/gestures);  verbal cues;  full weight-bearing   Solitario LE   hand held assist ;  5 feet  Gait Analysis: decreased hip/knee flexion;  decreased velocity of limb motion;  decreased step length;  decreased strength;  impaired balance        VITALS  T(C): 36.3 (09-27-21 @ 07:50), Max: 36.8 (09-26-21 @ 16:00)  HR: 77 (09-27-21 @ 07:50) (56 - 77)  BP: 118/76 (09-27-21 @ 07:50) (103/65 - 133/83)  RR: 18 (09-27-21 @ 07:50) (18 - 18)  SpO2: 99% (09-27-21 @ 07:50) (94% - 99%)  Wt(kg): --    MEDICATIONS   acetaminophen   Tablet .. 650 milliGRAM(s) every 6 hours PRN  benzocaine 15 mG/menthol 3.6 mG (Sugar-Free) Lozenge 1 Lozenge four times a day PRN  dextrose 40% Gel 15 Gram(s) once  dextrose 50% Injectable 25 Gram(s) once  dextrose 50% Injectable 12.5 Gram(s) once  dextrose 50% Injectable 25 Gram(s) once  enoxaparin Injectable 40 milliGRAM(s) at bedtime  famotidine    Tablet 20 milliGRAM(s) every 12 hours  glucagon  Injectable 1 milliGRAM(s) once  hydrALAZINE Injectable 10 milliGRAM(s) every 2 hours PRN  HYDROmorphone  Injectable 0.5 milliGRAM(s) every 4 hours PRN  insulin glargine Injectable (LANTUS) 24 Unit(s) at bedtime  insulin lispro (ADMELOG) corrective regimen sliding scale   three times a day before meals  insulin lispro Injectable (ADMELOG) 6 Unit(s) three times a day before meals  levETIRAcetam 500 milliGRAM(s) two times a day  melatonin 3 milliGRAM(s) at bedtime  nystatin Powder 1 Application(s) two times a day  oxyCODONE    IR 5 milliGRAM(s) every 4 hours PRN  oxyCODONE    IR 10 milliGRAM(s) every 4 hours PRN  phenol 1.4% (CHLORASEPTIC) Oral Spray 1 Spray(s) every 4 hours PRN  polyethylene glycol 3350 17 Gram(s) daily  senna 2 Tablet(s) at bedtime      RECENT LABS/IMAGING                            8.9    6.38  )-----------( 295      ( 24 Sep 2021 07:59 )             28.1     09-24    139  |  105  |  15.0  ----------------------------<  149<H>  4.5   |  24.0  |  0.70    Ca    8.9      24 Sep 2021 07:59  Phos  2.5     09-24  Mg     2.0     09-24    TPro  5.7<L>  /  Alb  3.3  /  TBili  0.3<L>  /  DBili  x   /  AST  61<H>  /  ALT  54<H>  /  AlkPhos  65  09-24    PT/INR - ( 24 Sep 2021 07:59 )   PT: 13.0 sec;   INR: 1.13 ratio         PTT - ( 24 Sep 2021 07:59 )  PTT:29.3 sec    CT head 9/7: No acute intracranial hemorrhage or mass effect.    CT cervical spine 9/7: No CT evidence of cervical spine fracture.    CT Chest 9/7: ET tube tip in the right mainstem bronchus; slight retraction is recommended.    MR Brain-Seizure, Epilepsy No Cont 9/13: Abnormal signal in the left parietotemporal lobe most likely represents an acute infarction. There is no hemorrhagic conversion. There is no midline shift or herniation. Additional differential diagnostic consideration is post seizure focus. Further correlation with MRI of the brain with contrast is suggested to exclude underlying lesion.    MR Angio Head 9/15: Corresponding to the abnormal signal seen in the previous MRI. There is a peripherally enhancing mass with satellite smaller lesions as described, most likely represents primary versus metastatic disease however, the former is more likely secondary to absence of large vasogenic edema. Tissue sampling is suggested. There is no substantial intracranial arterial stenosis or large vessel occlusion. There is no significant arterial stenosis in the neck or dissection.    CT Chest, Abdomen and Pelvis w/ Oral Cont and w/wo IV Cont 9/17: 5 mm lung nodule inseparable from left major fissure. This was not seen on the prior study although atelectatic changes were present. Trace left pleural fluid. 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 This can be confirmed with pelvic ultrasound.    MR Brain Stereotactic w/wo IV Cont 9/20: Unchanged left parieto-occipital enhancing mass.    CT Head 9/22:  1. Postsurgical changes from resection of a left parieto-occipital mass with intraparenchymal edema and blood products.  2. Small amount of postoperative subarachnoid and intraventricular hemorrhage is noted.  3. Thin postoperative subdural collection containing air and blood products measures up to approximately 4 mm.    Postoperative changes with surgical cavity is noted in the left parietal occipital lobe with surgical packing, postoperative fluid and blood components including foci of intraparenchymal hemorrhage in the left superior parietal lobe measuring 1.6 x 1.4 cm and smaller hemorrhage measuring 0.9 x 1 cm respectively. Trace amount of subdural hemorrhage is noted along the left parasagittal aspect of the falx with trace amount of hemorrhage layering in the posterior horn of the left lateral ventricle. There is surrounding mass effect. There is no midline shift or herniation.  Residual mass cannot be entirely excluded and follow-up with MRI of the brain with and without contrast is suggested in reasonable time upon resolution of blood products.    CT Head 9/23: No significant interval change    CT Head 9/24: Marginally improved recent perioperative findings without new abnormality    MR Head w/wo IV Cont 9/26: Postoperative changes as described. There are a few persistent enhancing lesions in the left parietal and occipital lobes which likely represent residual tumor. New mass effect and moderate vasogenic edema surrounding the resection cavity as described. No midline shift. Small new hemorrhage within the posterior body of the left lateral ventricle.      ----------------------------------------------------------------------------------------  PHYSICAL EXAM  Constitutional - NAD, Comfortable  HEENT - NCAT, EOMI, +drain  Neck - Supple, No limited ROM  Chest - Breathing comfortably, No wheezing  Cardiovascular - S1S2   Abdomen - Soft   Extremities - No C/C/E, No calf tenderness   Neurologic Exam -                    Cognitive - AAO to self, place, time, situation     Communication - Fluent     Cranial Nerves - right visual field deficits, patient able to compensate     Motor - No focal deficits                    LEFT    UE - ShAB 5/5, EF 5/5, EE 5/5, WE 5/5,  5/5                    RIGHT UE - ShAB 5/5, EF 5/5, EE 5/5, WE 5/5,  5/5                    LEFT    LE - HF 5/5, KE 5/5, DF 5/5, PF 5/5                    RIGHT LE - HF 5/5, KE 5/5, DF 5/5, PF 5/5        Sensory - Intact to LT  Psychiatric - Mood stable, Affect WNL  ----------------------------------------------------------------------------------------  ASSESSMENT/PLAN  56yFemale with functional deficits after undergoing craniotomy and resection of left occipital brain tumor  Brain Tumor - Keppra  HTN - Hydralazine  DM - Lantus, Lispro  Pain - Tylenol, oxycodone  DVT PPX - SCDs, lovenox  Rehab -   Patient to be discharged home today with home care. Discussed importance of outpatient follow up with neurosurgery and potentially with neuro-optometry moving forward.

## 2021-09-28 ENCOUNTER — NON-APPOINTMENT (OUTPATIENT)
Age: 56
End: 2021-09-28

## 2021-09-28 LAB — G6PD RBC-CCNC: 14.8 U/G HGB — SIGNIFICANT CHANGE UP (ref 7–20.5)

## 2021-09-29 ENCOUNTER — APPOINTMENT (OUTPATIENT)
Dept: ENDOCRINOLOGY | Facility: CLINIC | Age: 56
End: 2021-09-29

## 2021-10-06 LAB — SURGICAL PATHOLOGY STUDY: SIGNIFICANT CHANGE UP

## 2021-10-11 ENCOUNTER — NON-APPOINTMENT (OUTPATIENT)
Age: 56
End: 2021-10-11

## 2021-10-11 ENCOUNTER — APPOINTMENT (OUTPATIENT)
Dept: NEUROSURGERY | Facility: CLINIC | Age: 56
End: 2021-10-11
Payer: MEDICAID

## 2021-10-12 ENCOUNTER — NON-APPOINTMENT (OUTPATIENT)
Age: 56
End: 2021-10-12

## 2021-10-12 ENCOUNTER — APPOINTMENT (OUTPATIENT)
Dept: NEUROSURGERY | Facility: CLINIC | Age: 56
End: 2021-10-12
Payer: MEDICAID

## 2021-10-12 ENCOUNTER — APPOINTMENT (OUTPATIENT)
Dept: NEUROLOGY | Facility: CLINIC | Age: 56
End: 2021-10-12
Payer: MEDICAID

## 2021-10-12 VITALS
HEIGHT: 61 IN | DIASTOLIC BLOOD PRESSURE: 71 MMHG | WEIGHT: 263 LBS | OXYGEN SATURATION: 97 % | SYSTOLIC BLOOD PRESSURE: 104 MMHG | HEART RATE: 84 BPM | TEMPERATURE: 97.9 F | BODY MASS INDEX: 49.65 KG/M2

## 2021-10-12 DIAGNOSIS — Z78.9 OTHER SPECIFIED HEALTH STATUS: ICD-10-CM

## 2021-10-12 DIAGNOSIS — Z83.3 FAMILY HISTORY OF DIABETES MELLITUS: ICD-10-CM

## 2021-10-12 PROCEDURE — 99024 POSTOP FOLLOW-UP VISIT: CPT

## 2021-10-12 PROCEDURE — 99215 OFFICE O/P EST HI 40 MIN: CPT

## 2021-10-12 RX ORDER — METFORMIN HYDROCHLORIDE 500 MG/1
500 TABLET, COATED ORAL
Qty: 60 | Refills: 0 | Status: ACTIVE | COMMUNITY
Start: 2021-10-06

## 2021-10-12 RX ORDER — METFORMIN HYDROCHLORIDE 625 MG/1
TABLET ORAL
Refills: 0 | Status: ACTIVE | COMMUNITY

## 2021-10-13 NOTE — PHYSICAL EXAM
[General Appearance - Alert] : alert [General Appearance - In No Acute Distress] : in no acute distress [General Appearance - Well Nourished] : well nourished [Clean] : clean [Dry] : dry [Healing Well] : healing well [Intact] : intact [Staple Intact] : closed with intact staples [No Drainage] : without drainage [Normal Skin] : normal [Oriented To Time, Place, And Person] : oriented to person, place, and time [Impaired Insight] : insight and judgment were intact [Affect] : the affect was normal [Person] : oriented to person [Place] : oriented to place [Time] : oriented to time [Cranial Nerves Oculomotor (III)] : extraocular motion intact [Cranial Nerves Optic (II)] : visual acuity intact bilaterally,  pupils equal round and reactive to light [Cranial Nerves Trigeminal (V)] : facial sensation intact symmetrically [Cranial Nerves Facial (VII)] : face symmetrical [Cranial Nerves Glossopharyngeal (IX)] : tongue and palate midline [Cranial Nerves Accessory (XI - Cranial And Spinal)] : head turning and shoulder shrug symmetric [Cranial Nerves Hypoglossal (XII)] : there was no tongue deviation with protrusion [Motor Strength] : muscle strength was normal in all four extremities [Sensation Tactile Decrease] : light touch was intact [Sensation Pain / Temperature Decrease] : pain and temperature was intact [Balance] : balance was intact [No Visual Abnormalities] : no visible abnormalities [No Tenderness to Palpation] : no spine tenderness on palpation [Normal] : normal [Sclera] : the sclera and conjunctiva were normal [PERRL With Normal Accommodation] : pupils were equal in size, round, reactive to light, with normal accommodation [Extraocular Movements] : extraocular movements were intact [Neck Appearance] : the appearance of the neck was normal [] : no respiratory distress [No Spinal Tenderness] : no spinal tenderness [Abnormal Walk] : normal gait [Involuntary Movements] : no involuntary movements were seen [Motor Tone] : muscle strength and tone were normal [Coordination - Dysmetria Impaired Finger-to-Nose Bilateral] : not present

## 2021-10-13 NOTE — HISTORY OF PRESENT ILLNESS
[FreeTextEntry1] : ANUSHA ALBERT is a 56 year old female who presents for post op neurosurgical evaluation s/p left occipital craniotomy for left intra axial tumor on 9/21/21. She was admitted to [facility] with generalized seizures and high blood sugars. 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 \par Pathology: Brain tissue with reactive gliosis\par Currently she is feeling better, denies any drainage from incision, fever or chills. She denies current headache, loss of vision, changes in personality or cognition, difficulty speaking or finding the correct words, unilateral weakness or impaired sensation, problems with coordination, difficulty walking or falls. \par \par  \par  \par

## 2021-10-13 NOTE — REASON FOR VISIT
[Other: _____] : [unfilled] [de-identified] : Left occipital craniotomy for left intra axial tumor [de-identified] : 9/21/21

## 2021-10-13 NOTE — ASSESSMENT
[FreeTextEntry1] : Patient with above history and imaging. No neurological deficits. Will do follow up MRI brain, cervical, and thoracic to assess for any more inflammation.\par \par Plan:\par MRI head, cervical, and thoracic\par f/u after imaging\par Patient knows to call the office if there are any new or worsening symptoms. \par All questions and concerns answered and addressed in detail to patient's complete satisfaction. Patient verbalized understanding and agreed to plan.\par \par

## 2021-10-13 NOTE — DATA REVIEWED
[de-identified] : EXAM: MR BRAIN WAW IC\par \par PROCEDURE DATE: 09/26/2021\par \par \par \par INTERPRETATION: STUDY: MR BRAIN WITHOUT/WITH CONTRAST.\par \par CLINICAL INDICATION: Tumor resection\par \par TECHNIQUE: Multiplanar, multisequence MR imaging of the brain was performed.\par \par CONTRAST: 10 mL of Gadavist.\par \par COMPARISON: MR brain 9/15/2021 and 9/20/2021\par \par FINDINGS:\par There has been interval left parieto-occipital craniotomy for resection of a left parietal occipital intra-axial mass. There are postoperative changes including dural thickening, hemosiderin staining and a small heterogeneous extradural fluid collection measuring 7 mm subjacent to the craniotomy.\par \par There is a left parieto-occipital resection cavity. Prominent hemorrhagic products and small pneumocephalus is present within the resection cavity. No nodular enhancement is present along the margins of the resection cavity. There is an additional smaller resection superiorly within the left parietal lobe, also containing hemorrhagic products.\par \par There is a persistent cluster of enhancing lesions lateral to the dominant resection cavity which likely represent residual tumor. The most prominent lesion measures 0.8 x 1.3 x 1.4 cm (12; 17). A few additional focal nodular lesions are present posterior superiorly within the left parietal lobe. There is an additional ring-enhancing lesion posterior inferior to the dominant resection cavity in the left occipital lobe measuring 0.8 x 0.7 x 1.2 cm (13; 7) which is also likely residual tumor.\par \par There is new moderate vasogenic edema surrounding the margins of the resection cavity, which is limited to the left parietal and occipital lobes. There is associated mass effect contributing to left parietal occipital sulcal effacement and and effacement of the occipital horn and moderate compression of the atrium of the left lateral ventricle. No midline shift.\par \par The ventricles and sulci are age appropriate . Small new hemorrhage within the posterior body of the left lateral ventricle.\par \par There is normal flow-void within major cranial vessels suggestive of their patency.\par \par The orbital contents are grossly unremarkable. The paranasal sinuses are predominantly clear.. Trace opacification of a few mastoid air cells inferiorly on both sides.\par \par IMPRESSION:\par Postoperative changes as described. There are a few persistent enhancing lesions in the left parietal and occipital lobes which likely represent residual tumor. New mass effect and moderate vasogenic edema surrounding the resection cavity as described. No midline shift.\par \par Small new hemorrhage within the posterior body of the left lateral ventricle.\par

## 2021-10-21 ENCOUNTER — APPOINTMENT (OUTPATIENT)
Dept: NEUROSURGERY | Facility: CLINIC | Age: 56
End: 2021-10-21
Payer: MEDICAID

## 2021-10-21 VITALS
BODY MASS INDEX: 49.65 KG/M2 | WEIGHT: 263 LBS | DIASTOLIC BLOOD PRESSURE: 75 MMHG | TEMPERATURE: 97.4 F | SYSTOLIC BLOOD PRESSURE: 106 MMHG | OXYGEN SATURATION: 98 % | HEIGHT: 61 IN | HEART RATE: 77 BPM

## 2021-10-21 PROCEDURE — 99024 POSTOP FOLLOW-UP VISIT: CPT

## 2021-10-21 NOTE — PHYSICAL EXAM
[Clean] : clean [Dry] : dry [Healing Well] : healing well [Intact] : intact [No Drainage] : without drainage [Normal Skin] : normal [FreeTextEntry1] : Awake, alert, and oriented x 3. VSS. In no apparent distress. Short and long term memory intact. Speech is clear and appropriate. Affect is normal. Voice is strong. Respirations easy and even. Normal skin color and pigmentation. The sclera and conjunctiva normal. Ears, nose, and neck normal in appearance. EOMI, no nystagmus, facial sensation intact symmetrically, face symmetrical, hearing intact bilaterally, tongue and palate midline, head turning and shoulder shrug symmetric and no tongue deviation with protrusion. No pronator drift. No past-pointing, no tremors noted, no dysdiadochokinesia, and finger to nose dysmetria was not present. \par \par Incision healed\par Rises from a seated position in a comfortable fashion.\par \par Gait is well coordinated and stable without the use of an assistive device. Motor strength in the upper extremities 5/5 in the biceps, triceps, and hand . Motor strength in the lower extremities is 5/5 in the iliopsoas, quadriceps, and hamstrings. \par \par

## 2021-10-21 NOTE — REASON FOR VISIT
[de-identified] : Left occipital craniotomy for left intra axial tumor [de-identified] : 9/21/21

## 2021-10-21 NOTE — ASSESSMENT
[FreeTextEntry1] : Incision is clean dry and intact. Healing well, minimal areas of scabbing left. 1 suture removed from base of incision line. \par \par Plan:\par Will follow up after MRI's with Dr. Whipple and us in office.\par Patient knows to call the office if there are any new or worsening symptoms. \par All questions and concerns answered and addressed in detail to patient's complete satisfaction. Patient verbalized understanding and agreed to plan.\par \par

## 2021-10-21 NOTE — END OF VISIT
[Time Spent: ___ minutes] : I have spent [unfilled] minutes of time on the encounter.
Attending MD Devries: 5day female presenting with report of "irritation" of rectum and frequent stools. Baby here is well appearing, clinically well hydrated, active, afebrile. Stool in diaper is yellow, no blood visualized. Abdomen benign. +diaper dermatitis. Overall do not suspect any emergent GI process i.e. NEC or malrotation given well appearance and appropriate feeding. Will observe child in ED briefly to ensure feeding remains uneventful and to examine subsequent stools. Continue barrier protection for diaper dermatitis.

## 2021-10-21 NOTE — HISTORY OF PRESENT ILLNESS
[FreeTextEntry1] : ANUSHA ALBERT is a 56 year old female who presents for post op neurosurgical evaluation of incision s/p left occipital craniotomy for left intra axial tumor on 9/21/21. She was admitted to [facility] with generalized seizures and high blood sugars. 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 \par Pathology: Brain tissue with reactive gliosis\par \par Currently she is feeling better, denies any drainage from incision, fever or chills. She denies current headache, loss of vision, changes in personality or cognition, difficulty speaking or finding the correct words, unilateral weakness or impaired sensation, problems with coordination, difficulty walking or falls. Incision is dry and intact. 1 suture left at the base of the incision line. \par \par  \par  \par

## 2021-10-30 ENCOUNTER — APPOINTMENT (OUTPATIENT)
Dept: MRI IMAGING | Facility: CLINIC | Age: 56
End: 2021-10-30
Payer: MEDICAID

## 2021-10-30 ENCOUNTER — OUTPATIENT (OUTPATIENT)
Dept: OUTPATIENT SERVICES | Facility: HOSPITAL | Age: 56
LOS: 1 days | End: 2021-10-30

## 2021-10-30 DIAGNOSIS — D49.6 NEOPLASM OF UNSPECIFIED BEHAVIOR OF BRAIN: ICD-10-CM

## 2021-10-30 DIAGNOSIS — Z98.890 OTHER SPECIFIED POSTPROCEDURAL STATES: Chronic | ICD-10-CM

## 2021-10-30 PROCEDURE — 72157 MRI CHEST SPINE W/O & W/DYE: CPT | Mod: 26

## 2021-10-30 PROCEDURE — 70553 MRI BRAIN STEM W/O & W/DYE: CPT | Mod: 26

## 2021-10-30 PROCEDURE — 72156 MRI NECK SPINE W/O & W/DYE: CPT | Mod: 26

## 2021-11-01 ENCOUNTER — APPOINTMENT (OUTPATIENT)
Dept: NEUROLOGY | Facility: CLINIC | Age: 56
End: 2021-11-01
Payer: MEDICAID

## 2021-11-01 VITALS
DIASTOLIC BLOOD PRESSURE: 77 MMHG | OXYGEN SATURATION: 98 % | HEART RATE: 88 BPM | BODY MASS INDEX: 49.67 KG/M2 | WEIGHT: 263.1 LBS | TEMPERATURE: 96.6 F | HEIGHT: 61 IN | SYSTOLIC BLOOD PRESSURE: 109 MMHG

## 2021-11-01 DIAGNOSIS — E11.9 TYPE 2 DIABETES MELLITUS W/OUT COMPLICATIONS: ICD-10-CM

## 2021-11-01 PROCEDURE — 99215 OFFICE O/P EST HI 40 MIN: CPT

## 2021-11-02 PROBLEM — E11.9 DIABETES MELLITUS: Status: ACTIVE | Noted: 2021-10-12

## 2021-11-02 NOTE — PHYSICAL EXAM
[FreeTextEntry1] : GENERAL PHYSICAL EXAM:\par GEN: no distress, normal affect\par HEENT: NCAT, OP clear\par EYES: sclera white, conjunctiva clear, no nystagmus\par NECK: supple\par CV: RRR    		\par PULM: CTAB, no wheezing\par GI: soft ABD, +BS, NT, ND\par EXT: peripheral pulse intact, no cyanosis\par MSK: muscle tone and strength normal\par SKIN: warm, dry, no rash or lesion on exposed skin \par \par NEUROLOGICAL EXAM:\par Mental Status\par Orientation: alert and oriented to person, place, time, and situation \par Language: clear and fluent\par \par Cranial Nerves\par II: right homonymous hemianopsia \par III, IV, VI: PERRL, EOMI\par V, VII: facial sensation and movement intact and symmetric \par VIII: hearing intact \par IX, X: uvula midline, soft palate elevates normally \par XI: BL shoulder shrug intact \par XII: tongue midline\par \par Motor\par Shoulder abd: 5 (R), 5 (L)\par EF/EE: 5 (R), 5 (L)\par WF/WE: 5 (R), 5 (L)\par hand : 5 (R), 5 (L)\par HF/HE: 5 (R), 5 (L)\par KF/KE: 5 (R), 5 (L)\par DF/PF: 5 (R), 5 (L)                \par Tone and bulk are normal in upper and lower limbs\par No pronator drift\par \par Sensation\par Intact to light touch and pinprick in all 4 EXTs\par \par Reflex\par 2+ in BL biceps, triceps, brachioradialis, patella, ankle                                    \par Plantar responses downward bilaterally\par \par Coordination\par Normal FTN bilaterally\par \par Gait\par Normal stance, stride, and pivot turn\par Tandem walk intact\par Negative Romberg\par \par

## 2021-11-02 NOTE — HISTORY OF PRESENT ILLNESS
[FreeTextEntry1] : PCP: Dr. Reece Bo\par \par This is a 56 year-old female with a history of DM and 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 Levy) who presents for posthospital followup for seizure.\par \par Pt presented to [facility] on 9/8 with new onset BTC seizures x2 within 24hrs in setting of severe DKA (BS >500s). She was initially started on LEV, but quickly tapered off. Continue video EEG off LEV was normal. MRI brain revealed an enhancing lesion in the left temporoparietal lobe that was concerning for malignancy. CT C/A/P negative. Lesion was resected on 9/21 by Dr. Levy, with pathology positive for reactive gliosis. Pt was discharged on LEV 500mg BID for sz ppx. MRI C- and T-spine unremarkable. No further sz. Reports blurry vision in the right visual field that requires multiple eye blinking to regain visual clarity. \par \par SEIZURE RISK FACTORS:\par Hyperglycemia. Left temporoparietal lesion s/p resection 9/2021. Patient was a product of normal pregnancy and delivery. No history of febrile seizure, TBI, CNS infection, or FH of seizures. \par \par CURRENT AED:\par LEV 500mg BID – started 9/2021\par \par PREVIOUS AED:\par None\par \par IMAGING: \par Multiple MRIs, selected few below.\par \par MRI brain w/wo 10/30/21 ([facility]): Normal temporal evolution of previously seen hemorrhage in the left occipital surgical bed. Decreased edema surrounding the surgical cavity. Somewhat thickened peripheral enhancement of the surgical bed. Nodular enhancement along the mesial aspect of the surgical cavity measures 0.8 x 0.8 x 0.9 cm (maximal anterior-posterior by transverse by craniocaudad dimensions). Nodular enhancement along the mesial aspect of the surgical cavity measures 2.2 x 0.8 x 0.9 cm. Nonspecific 1.2 x 0.9 x 0.9 cm focus of enhancement superior to the surgical cavity. While findings may represent postsurgical changes, presence of neoplasm is not excluded. \par \par MRI C- and T-spines w/wo 10/30/21 ([facility]): unremarkable \par \par MRI brain w/ contrast 9/15/21 ([facility]): Corresponding to the FLAIR hyperintensity and diffuse diffusion restriction signal in the left temporoparietal region, there is a heterogeneous and peripherally enhancing lesion measuring 2.5 x 1.5 x 2.9 cm in AP, TRV and craniocaudal dimensions. Superoposterior to the lesion and best seen on series 5 image 16 series 3 image 18 there are small ring-enhancing lesions, largest of which measures 4 mm. There is extension of the lesion close to the posterior horn of the left lateral ventricle without extension into the ependyma. No additional lesions are identified in the brain parenchyma. IMPRESSION: There is a peripherally enhancing mass with satellite smaller lesions as described, most likely represents primary versus metastatic disease however, the former is more likely secondary to absence of large vasogenic edema.\par \par MRI brain w/o, epilepsy protocol 9/13/21 ([facility]): Abnormal signal in the left parietotemporal lobe most likely represents an acute infarction. There is no hemorrhagic conversion. There is no midline shift or herniation. Additional differential diagnostic consideration is post seizure focus. Further correlation with MRI of the brain with contrast is suggested to exclude underlying lesion.\par \par NEUROPHYSIOLOGY:\par None-elective EMU 9/13 – 9/14/21 ([facility]): normal; off AED\par cvEEG 9/8 – 9/10/21 ([facility]): improvement from severe to mild-mod gen slowing with propofol taper\par \par NEUROPSYCHOLOGY: \par none

## 2021-11-02 NOTE — ASSESSMENT
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 Levy, pathology - reactive gliosis) with residual right homonymous hemianopsia who presents for follow-up for 2 BTC sz's in setting of severe DKA with BS > 500. Suspect 2 BTC sz's in September was provoked by hyperglycemia, but unable to exclude possibility of structural etiology (secondary to left temporoparietal lesion), though less likely since EEG prior to resection was normal. But pt is now certainly at risk for developing focal epilepsy post-resection. \par \par \par - Admit to EMU 11/17. The purpose of the EMU admission is to establish the first diagnosis of a seizure disorder, which is medically necessary to guide clinical management and select the most appropriate therapeutic regimen. The expected length of stay is 3-4 days.\par => stop LEV in EMU\par \par - decrease LEV 500mg BID to 250mg BID\par - advised pt to have formal visual field evaluation to follow post-op right homonymous hemianopsia\par - Patient was educated regarding risks and driving privileges associated with the [facility] Guidelines; patient instructed not to drive. \par - f/u in 3 months\par \par \par Personally reviewed all images, labs and other studies. More than 50% of time spent on counseling and educating patient on differential, workup, disease course, and treatment/management. All questions and concerns answered and addressed in detail to patient's complete satisfaction. Patient verbalized understanding and agreed to plan.\par

## 2021-11-02 NOTE — CONSULT LETTER
[Dear  ___] : Dear  [unfilled], [Sincerely,] : Sincerely, [FreeTextEntry3] : Prudence Brewer MD\par Director, Epilepsy/EMU\par [facility] \par [facility] [place]\par [address] [place] [address] \par Tel: [phone]; Fax: [phone]  [FreeTextEntry1] : I had the pleasure of seeing your patient, ANUSHA ALBERT, in my office today. Please see my note below. \par \par If you have any questions, please do not hesitate to contact me.

## 2021-11-03 NOTE — ASSESSMENT
[FreeTextEntry1] : 55 yo woman with diabetes and seizure in the setting of hyperglycemia with left temporoparietal lesion - ddx includes demyelinating vs infarct vs necrosis\par \par L temporopariteal lesion\par Recommend MRI brain, C and T spine w/wo contrast\par Low clinical supsicion for demyelinating disease at this time\par \par Pt advised to follow up with me in 1 month \par She was advised to notify me for worsening symptoms.\par \par

## 2021-11-03 NOTE — PHYSICAL EXAM
[General Appearance - Alert] : alert [Abnormal Walk] : normal gait [Neck Appearance] : the appearance of the neck was normal [] : no respiratory distress [Skin Color & Pigmentation] : normal skin color and pigmentation [FreeTextEntry1] : Mental Status: AAO x3, no dysarthria, no aphasia, communicating appropriately\par CN: PERRL, EOMI, VFF, V1-V3 sensation intact, hearing grossly intact, no facial asymmetry, tongue midline\par Motor: 5/5 x 4 extremities\par Sensory: intact to light touch throughout\par Reflexes: 2+ throughout, toes equivocal bilaterally\par Coordination: no dysmetria on FTN\par Gait: steady\par \par

## 2021-11-03 NOTE — HISTORY OF PRESENT ILLNESS
[FreeTextEntry1] : Interval History:\par Since initial inpatient history, patient had an MRI brain in the hospital which showed flair abnormality and diffusion restriction in the left temporoparietal region suspicious for underlying metastasis vs less likely primary malignancy. She was started on Keppra 500mg bid for seizure. She had a brain biopsy which included the differential of demyelinating process,  infarct or necrosis of other lesions/tumor. She denies having change in vision, focal numbness, focal weakness, difficulty walking or word finding difficulty now or in the past. Aside from DM, she denies any past medical history. She is following with Dr. Brewer for seizure.\par \par Initial Inpatient history 9/8/21:\par 55 yo woman with past medical history of diabetes was admitted after being\par found unresponsive in a pool of urine at home. She was found to be in\par severe DKA upon arrival to the hospital. She was unresponsive and intubated and\par sedated. She had a witnessed generalized tonic clonic seizure overnight and\par versed was added to propofol at that time. She was loaded on Keppra 1 g IV x 1\par dose last night. CT head with no acute findings.  She\par states prior to admission, she was at home getting ready for work and then\par blacked out. She states she does not remember after this. She remembers feeling\par confused prior to blacking out. She denies prior episodes of LOC or a personal\par or family history of seizures. She states she did not know she had diabetes\par prior to this admission. She states she was admitted in the past for high blood\par sugar, but was told she didn't have diabetes. She denies prior head trauma or\par learning difficulties growing up. She has no further complaints.

## 2021-11-14 ENCOUNTER — APPOINTMENT (OUTPATIENT)
Dept: DISASTER EMERGENCY | Facility: CLINIC | Age: 56
End: 2021-11-14

## 2021-11-14 ENCOUNTER — LABORATORY RESULT (OUTPATIENT)
Age: 56
End: 2021-11-14

## 2021-11-17 ENCOUNTER — INPATIENT (INPATIENT)
Facility: HOSPITAL | Age: 56
LOS: 1 days | Discharge: HOME | DRG: 101 | End: 2021-11-19
Attending: INTERNAL MEDICINE | Admitting: INTERNAL MEDICINE
Payer: COMMERCIAL

## 2021-11-17 VITALS
SYSTOLIC BLOOD PRESSURE: 107 MMHG | WEIGHT: 268.96 LBS | RESPIRATION RATE: 16 BRPM | DIASTOLIC BLOOD PRESSURE: 62 MMHG | TEMPERATURE: 99 F | HEART RATE: 80 BPM | HEIGHT: 61 IN | OXYGEN SATURATION: 99 %

## 2021-11-17 DIAGNOSIS — G93.9 DISORDER OF BRAIN, UNSPECIFIED: Chronic | ICD-10-CM

## 2021-11-17 DIAGNOSIS — Z98.890 OTHER SPECIFIED POSTPROCEDURAL STATES: Chronic | ICD-10-CM

## 2021-11-17 DIAGNOSIS — G40.909 EPILEPSY, UNSPECIFIED, NOT INTRACTABLE, WITHOUT STATUS EPILEPTICUS: ICD-10-CM

## 2021-11-17 LAB
ALBUMIN SERPL ELPH-MCNC: 4.2 G/DL — SIGNIFICANT CHANGE UP (ref 3.3–5.2)
ALP SERPL-CCNC: 60 U/L — SIGNIFICANT CHANGE UP (ref 40–120)
ALT FLD-CCNC: 8 U/L — SIGNIFICANT CHANGE UP
ANION GAP SERPL CALC-SCNC: 10 MMOL/L — SIGNIFICANT CHANGE UP (ref 5–17)
AST SERPL-CCNC: 9 U/L — SIGNIFICANT CHANGE UP
BASOPHILS # BLD AUTO: 0.05 K/UL — SIGNIFICANT CHANGE UP (ref 0–0.2)
BASOPHILS NFR BLD AUTO: 0.7 % — SIGNIFICANT CHANGE UP (ref 0–2)
BILIRUB SERPL-MCNC: <0.2 MG/DL — LOW (ref 0.4–2)
BUN SERPL-MCNC: 12.6 MG/DL — SIGNIFICANT CHANGE UP (ref 8–20)
CALCIUM SERPL-MCNC: 9.6 MG/DL — SIGNIFICANT CHANGE UP (ref 8.6–10.2)
CHLORIDE SERPL-SCNC: 106 MMOL/L — SIGNIFICANT CHANGE UP (ref 98–107)
CO2 SERPL-SCNC: 28 MMOL/L — SIGNIFICANT CHANGE UP (ref 22–29)
CREAT SERPL-MCNC: 0.91 MG/DL — SIGNIFICANT CHANGE UP (ref 0.5–1.3)
EOSINOPHIL # BLD AUTO: 0.19 K/UL — SIGNIFICANT CHANGE UP (ref 0–0.5)
EOSINOPHIL NFR BLD AUTO: 2.5 % — SIGNIFICANT CHANGE UP (ref 0–6)
GLUCOSE BLDC GLUCOMTR-MCNC: 102 MG/DL — HIGH (ref 70–99)
GLUCOSE BLDC GLUCOMTR-MCNC: 82 MG/DL — SIGNIFICANT CHANGE UP (ref 70–99)
GLUCOSE SERPL-MCNC: 87 MG/DL — SIGNIFICANT CHANGE UP (ref 70–99)
HCT VFR BLD CALC: 37.6 % — SIGNIFICANT CHANGE UP (ref 34.5–45)
HGB BLD-MCNC: 11.9 G/DL — SIGNIFICANT CHANGE UP (ref 11.5–15.5)
IMM GRANULOCYTES NFR BLD AUTO: 0.3 % — SIGNIFICANT CHANGE UP (ref 0–1.5)
LYMPHOCYTES # BLD AUTO: 3.02 K/UL — SIGNIFICANT CHANGE UP (ref 1–3.3)
LYMPHOCYTES # BLD AUTO: 39.9 % — SIGNIFICANT CHANGE UP (ref 13–44)
MAGNESIUM SERPL-MCNC: 2.1 MG/DL — SIGNIFICANT CHANGE UP (ref 1.6–2.6)
MCHC RBC-ENTMCNC: 31.5 PG — SIGNIFICANT CHANGE UP (ref 27–34)
MCHC RBC-ENTMCNC: 31.6 GM/DL — LOW (ref 32–36)
MCV RBC AUTO: 99.5 FL — SIGNIFICANT CHANGE UP (ref 80–100)
MONOCYTES # BLD AUTO: 0.41 K/UL — SIGNIFICANT CHANGE UP (ref 0–0.9)
MONOCYTES NFR BLD AUTO: 5.4 % — SIGNIFICANT CHANGE UP (ref 2–14)
NEUTROPHILS # BLD AUTO: 3.88 K/UL — SIGNIFICANT CHANGE UP (ref 1.8–7.4)
NEUTROPHILS NFR BLD AUTO: 51.2 % — SIGNIFICANT CHANGE UP (ref 43–77)
PHOSPHATE SERPL-MCNC: 3.4 MG/DL — SIGNIFICANT CHANGE UP (ref 2.4–4.7)
PLATELET # BLD AUTO: 298 K/UL — SIGNIFICANT CHANGE UP (ref 150–400)
POTASSIUM SERPL-MCNC: 4.2 MMOL/L — SIGNIFICANT CHANGE UP (ref 3.5–5.3)
POTASSIUM SERPL-SCNC: 4.2 MMOL/L — SIGNIFICANT CHANGE UP (ref 3.5–5.3)
PROT SERPL-MCNC: 6.4 G/DL — LOW (ref 6.6–8.7)
RBC # BLD: 3.78 M/UL — LOW (ref 3.8–5.2)
RBC # FLD: 13.2 % — SIGNIFICANT CHANGE UP (ref 10.3–14.5)
SODIUM SERPL-SCNC: 144 MMOL/L — SIGNIFICANT CHANGE UP (ref 135–145)
WBC # BLD: 7.57 K/UL — SIGNIFICANT CHANGE UP (ref 3.8–10.5)
WBC # FLD AUTO: 7.57 K/UL — SIGNIFICANT CHANGE UP (ref 3.8–10.5)

## 2021-11-17 PROCEDURE — 99222 1ST HOSP IP/OBS MODERATE 55: CPT

## 2021-11-17 PROCEDURE — 99223 1ST HOSP IP/OBS HIGH 75: CPT

## 2021-11-17 PROCEDURE — 95720 EEG PHY/QHP EA INCR W/VEEG: CPT

## 2021-11-17 RX ORDER — INSULIN LISPRO 100/ML
VIAL (ML) SUBCUTANEOUS
Refills: 0 | Status: DISCONTINUED | OUTPATIENT
Start: 2021-11-17 | End: 2021-11-19

## 2021-11-17 RX ORDER — DEXTROSE 50 % IN WATER 50 %
15 SYRINGE (ML) INTRAVENOUS ONCE
Refills: 0 | Status: DISCONTINUED | OUTPATIENT
Start: 2021-11-17 | End: 2021-11-19

## 2021-11-17 RX ORDER — INSULIN LISPRO 100/ML
VIAL (ML) SUBCUTANEOUS AT BEDTIME
Refills: 0 | Status: DISCONTINUED | OUTPATIENT
Start: 2021-11-17 | End: 2021-11-19

## 2021-11-17 RX ORDER — LEVETIRACETAM 250 MG/1
250 TABLET, FILM COATED ORAL
Refills: 0 | Status: DISCONTINUED | OUTPATIENT
Start: 2021-11-17 | End: 2021-11-17

## 2021-11-17 RX ORDER — GLUCAGON INJECTION, SOLUTION 0.5 MG/.1ML
1 INJECTION, SOLUTION SUBCUTANEOUS ONCE
Refills: 0 | Status: DISCONTINUED | OUTPATIENT
Start: 2021-11-17 | End: 2021-11-19

## 2021-11-17 RX ORDER — DEXTROSE 50 % IN WATER 50 %
25 SYRINGE (ML) INTRAVENOUS ONCE
Refills: 0 | Status: DISCONTINUED | OUTPATIENT
Start: 2021-11-17 | End: 2021-11-19

## 2021-11-17 RX ORDER — DEXTROSE 50 % IN WATER 50 %
12.5 SYRINGE (ML) INTRAVENOUS ONCE
Refills: 0 | Status: DISCONTINUED | OUTPATIENT
Start: 2021-11-17 | End: 2021-11-19

## 2021-11-17 RX ORDER — ENOXAPARIN SODIUM 100 MG/ML
40 INJECTION SUBCUTANEOUS DAILY
Refills: 0 | Status: DISCONTINUED | OUTPATIENT
Start: 2021-11-17 | End: 2021-11-19

## 2021-11-17 RX ORDER — METFORMIN HYDROCHLORIDE 850 MG/1
1 TABLET ORAL
Qty: 0 | Refills: 0 | DISCHARGE

## 2021-11-17 RX ORDER — ACETAMINOPHEN 500 MG
650 TABLET ORAL EVERY 6 HOURS
Refills: 0 | Status: DISCONTINUED | OUTPATIENT
Start: 2021-11-17 | End: 2021-11-19

## 2021-11-17 RX ORDER — SODIUM CHLORIDE 9 MG/ML
1000 INJECTION, SOLUTION INTRAVENOUS
Refills: 0 | Status: DISCONTINUED | OUTPATIENT
Start: 2021-11-17 | End: 2021-11-19

## 2021-11-17 RX ORDER — LANOLIN ALCOHOL/MO/W.PET/CERES
3 CREAM (GRAM) TOPICAL AT BEDTIME
Refills: 0 | Status: DISCONTINUED | OUTPATIENT
Start: 2021-11-17 | End: 2021-11-19

## 2021-11-17 NOTE — H&P ADULT - ASSESSMENT
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 Levy) who presents to EMU to evaluate for seizures.     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

## 2021-11-17 NOTE — H&P ADULT - HISTORY OF PRESENT ILLNESS
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 Levy) who presents to EMU to evaluate for seizures. Pt was admitted at [facility] in Sep 21 with new onset seizures and was found to have lesion in the left temporoparietal lobe that was concerning for malignancy. s/p resection, path showing reactive gliosis. Pt was started on Keppra. She reports no further seizures since discharge. Had headaches on and off, with blurry vision sometimes.  No LOC, abnormal movement of extremities, no chest pain, SOB, changes in urinary or bowel habits.

## 2021-11-17 NOTE — H&P ADULT - NSICDXPASTSURGICALHX_GEN_ALL_CORE_FT
PAST SURGICAL HISTORY:  H/O knee surgery      PAST SURGICAL HISTORY:  Brain lesion s/p resection 09/21    H/O knee surgery

## 2021-11-17 NOTE — CONSULT NOTE ADULT - ASSESSMENT
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 Levy) who presents to EMU to establish the first diagnosis of a seizure disorder. Personally reviewed all imagines, labs, EEG and other studies.      Recommendation:  - cvEEG  - taper off levetiracetam 500mg BID: 250mg BID starting tonight, stop after 2 doses  - tele monitor  - bed rail up at all times  - bed rail padding  - OOB only with supervision and assist  - seizure precaution  - medical management and support care per primary team       Thank you for allowing Epilepsy to participate in the care of this patient.   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 Levy) who presents to EMU to establish the first diagnosis of a seizure disorder. Personally reviewed all imagines, labs, EEG and other studies.      Recommendation:  - cvEEG  - stop levetiracetam 250mg BID  - tele monitor  - bed rail up at all times  - bed rail padding  - OOB only with supervision and assist  - seizure precaution  - medical management and support care per primary team       Thank you for allowing Epilepsy to participate in the care of this patient.   ______________________  Prudence Brewer MD   Director, Epilepsy/EMU - [facility]   Epilepsy Consult #: [phone] ([phone])

## 2021-11-17 NOTE — CONSULT NOTE ADULT - SUBJECTIVE AND OBJECTIVE BOX
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 Levy) who presents to EMU to establish the first diagnosis of a seizure disorder.    Pt presented to [facility] on 9/8 with new onset BTC seizures x2 within 24hrs in setting of severe DKA (BS >500s). She was initially started on LEV, but quickly tapered off. Continue video EEG off LEV was normal. MRI brain revealed an enhancing lesion in the left temporoparietal lobe that was concerning for malignancy. CT C/A/P negative. Lesion was resected on 9/21 by Dr. Levy, with pathology positive for reactive gliosis. Pt was discharged on LEV 500mg BID for sz ppx. MRI C- and T-spine unremarkable. No further sz. Reports blurry vision in the right visual field that requires multiple eye blinking to regain visual clarity.     SEIZURE RISK FACTORS:  Hyperglycemia. Left temporoparietal lesion s/p resection 9/2021. Patient was a product of normal pregnancy and delivery. No history of febrile seizure, TBI, CNS infection, or FH of seizures.     CURRENT AED:  LEV 500mg BID – started 9/2021    PREVIOUS AED:  None    IMAGING:   Multiple MRIs, selected few below.    MRI brain w/wo 10/30/21 ([place]): Normal temporal evolution of previously seen hemorrhage in the left occipital surgical bed. Decreased edema surrounding the surgical cavity. Somewhat thickened peripheral enhancement of the surgical bed. Nodular enhancement along the mesial aspect of the surgical cavity measures 0.8 x 0.8 x 0.9 cm (maximal anterior-posterior by transverse by craniocaudad dimensions). Nodular enhancement along the mesial aspect of the surgical cavity measures 2.2 x 0.8 x 0.9 cm. Nonspecific 1.2 x 0.9 x 0.9 cm focus of enhancement superior to the surgical cavity. While findings may represent postsurgical changes, presence of neoplasm is not excluded.     MRI C- and T-spines w/wo 10/30/21 ([place]): unremarkable     MRI brain w/ contrast 9/15/21 ([facility]): Corresponding to the FLAIR hyperintensity and diffuse diffusion restriction signal in the left temporoparietal region, there is a heterogeneous and peripherally enhancing lesion measuring 2.5 x 1.5 x 2.9 cm in AP, TRV and craniocaudal dimensions. Superoposterior to the lesion and best seen on series 5 image 16 series 3 image 18 there are small ring-enhancing lesions, largest of which measures 4 mm. There is extension of the lesion close to the posterior horn of the left lateral ventricle without extension into the ependyma. No additional lesions are identified in the brain parenchyma. IMPRESSION: There is a peripherally enhancing mass with satellite smaller lesions as described, most likely represents primary versus metastatic disease however, the former is more likely secondary to absence of large vasogenic edema.    MRI brain w/o, epilepsy protocol 9/13/21 ([facility]): Abnormal signal in the left parietotemporal lobe most likely represents an acute infarction. There is no hemorrhagic conversion. There is no midline shift or herniation. Additional differential diagnostic consideration is post seizure focus. Further correlation with MRI of the brain with contrast is suggested to exclude underlying lesion.    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    ROS:  Negative for constitutional, skin, eyes, ENT, respiratory, cardiovascular, gastrointestinal, genitourinary, musculoskeletal, neurologic, psychiatric, hematology/lymphatics, endocrine, allergic/immunologic.      VITALS:  T(C): 37 (11-17-21 @ 15:05), Max: 37 (11-17-21 @ 15:05)  HR: 80 (11-17-21 @ 15:05) (80 - 80)  BP: 107/62 (11-17-21 @ 15:05) (107/62 - 107/62)  ABP: --  RR: 16 (11-17-21 @ 15:05) (16 - 16)  SpO2: 99% (11-17-21 @ 15:05) (99% - 99%)  CVP(cm H2O): --    GENERAL PHYSICAL EXAM:  GEN: no distress  HEENT: NCAT, OP clear  EYES: sclera white, conjunctiva clear, no nystagmus  NECK: supple  CV: RRR, no murmur     		  PULM: CTAB, no wheezing  ABD: soft ABD, +BS, NT  EXT: peripheral pulse intact, no cyanosis  MSK: muscle tone and strength normal  SKIN: warm, dry    NEUROLOGICAL EXAM:  Mental Status  Orientation: alert and oriented to person, place, time, and situation   Language: clear and fluent    Cranial Nerves  II: full visual fields intact   III, IV, VI: PERRL, EOMI  V, VII: facial sensation and movement intact and symmetric   VIII: hearing intact   IX, X: uvula midline, soft palate elevates normally   XI: BL shoulder shrug intact   XII: tongue midline    Motor  5/5 BUE and BLE                 Tone and bulk are normal in upper and lower limbs  No pronator drift    Sensation  Intact to light touch and pinprick in all 4 EXTs    Reflex  2+ in BL biceps and patella                                   Plantar responses downward bilaterally    Coordination  Normal FTN bilaterally    Gait  Deferred      LABS:   pending                                                                                            [facility]                                                                     MD Leda Desai DO                                              Epilepsy Consult #: [id number] ([phone])                                               Office: [address] [place] [address], [address]                                                 Phone: [phone]; Fax: [phone]                            ==============================================    EPILEPSY INITIAL CONSULT NOTE      HPI  This is a 56 year-old female with a history of DM and 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 Levy) who presents to EMU to establish the first diagnosis of a seizure disorder.    Pt presented to [facility] on 9/8 with new onset BTC seizures x2 within 24hrs in setting of severe DKA (BS >500s). She was initially started on LEV, but quickly tapered off. Continue video EEG off LEV was normal. MRI brain revealed an enhancing lesion in the left temporoparietal lobe that was concerning for malignancy. CT C/A/P negative. Lesion was resected on 9/21 by Dr. Levy, with pathology positive for reactive gliosis. Pt was discharged on LEV 500mg BID for sz ppx. MRI C- and T-spine unremarkable. No further sz. Reports blurry vision in the right visual field that requires multiple eye blinking to regain visual clarity. In preparation for this EMU admission, LEV decreased to 250mg BID, with plan to be held while undergoing EEG study.    SEIZURE RISK FACTORS:  Hyperglycemia. Left temporoparietal lesion s/p resection 9/2021. Patient was a product of normal pregnancy and delivery. No history of febrile seizure, TBI, CNS infection, or FH of seizures.     CURRENT AED:  LEV 250mg BID – started 9/2021    PREVIOUS AED:  None    IMAGING:   Multiple MRIs, selected few below.    MRI brain w/wo 10/30/21 ([place]): Normal temporal evolution of previously seen hemorrhage in the left occipital surgical bed. Decreased edema surrounding the surgical cavity. Somewhat thickened peripheral enhancement of the surgical bed. Nodular enhancement along the mesial aspect of the surgical cavity measures 0.8 x 0.8 x 0.9 cm (maximal anterior-posterior by transverse by craniocaudad dimensions). Nodular enhancement along the mesial aspect of the surgical cavity measures 2.2 x 0.8 x 0.9 cm. Nonspecific 1.2 x 0.9 x 0.9 cm focus of enhancement superior to the surgical cavity. While findings may represent postsurgical changes, presence of neoplasm is not excluded.     MRI C- and T-spines w/wo 10/30/21 ([place]): unremarkable     MRI brain w/ contrast 9/15/21 ([facility]): Corresponding to the FLAIR hyperintensity and diffuse diffusion restriction signal in the left temporoparietal region, there is a heterogeneous and peripherally enhancing lesion measuring 2.5 x 1.5 x 2.9 cm in AP, TRV and craniocaudal dimensions. Superoposterior to the lesion and best seen on series 5 image 16 series 3 image 18 there are small ring-enhancing lesions, largest of which measures 4 mm. There is extension of the lesion close to the posterior horn of the left lateral ventricle without extension into the ependyma. No additional lesions are identified in the brain parenchyma. IMPRESSION: There is a peripherally enhancing mass with satellite smaller lesions as described, most likely represents primary versus metastatic disease however, the former is more likely secondary to absence of large vasogenic edema.    MRI brain w/o, epilepsy protocol 9/13/21 ([facility]): Abnormal signal in the left parietotemporal lobe most likely represents an acute infarction. There is no hemorrhagic conversion. There is no midline shift or herniation. Additional differential diagnostic consideration is post seizure focus. Further correlation with MRI of the brain with contrast is suggested to exclude underlying lesion.    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 1000 milliLiter(s) (50 mL/Hr) IV Continuous <Continuous>  dextrose 5%. 1000 milliLiter(s) (100 mL/Hr) IV Continuous <Continuous>  dextrose 50% Injectable 25 Gram(s) IV Push once  dextrose 50% Injectable 12.5 Gram(s) IV Push once  dextrose 50% Injectable 25 Gram(s) IV Push once  enoxaparin Injectable 40 milliGRAM(s) SubCutaneous daily  glucagon  Injectable 1 milliGRAM(s) IntraMuscular once  insulin lispro (ADMELOG) corrective regimen sliding scale   SubCutaneous three times a day before meals  insulin lispro (ADMELOG) corrective regimen sliding scale   SubCutaneous at bedtime    ALLERGIES:   No Known Allergies    FAMILY HISTORY:  Non-contributory    SOCIAL HISTORY:   Denied EtOH, tobacco, illicit drugs.    ROS:  Negative for constitutional, skin, eyes, ENT, respiratory, cardiovascular, gastrointestinal, genitourinary, musculoskeletal, neurologic, psychiatric, hematology/lymphatics, endocrine, allergic/immunologic.      VITALS:  T(C): 37 (11-17-21 @ 15:05), Max: 37 (11-17-21 @ 15:05)  HR: 80 (11-17-21 @ 15:05) (80 - 80)  BP: 107/62 (11-17-21 @ 15:05) (107/62 - 107/62)  ABP: --  RR: 16 (11-17-21 @ 15:05) (16 - 16)  SpO2: 99% (11-17-21 @ 15:05) (99% - 99%)  CVP(cm H2O): --    GENERAL PHYSICAL EXAM:  GEN: no distress  HEENT: NCAT, OP clear  EYES: sclera white, conjunctiva clear, no nystagmus  NECK: supple  CV: RRR, no murmur     		  PULM: CTAB, no wheezing  ABD: soft ABD, +BS, NT  EXT: peripheral pulse intact, no cyanosis  MSK: muscle tone and strength normal  SKIN: warm, dry    NEUROLOGICAL EXAM:  Mental Status  Orientation: alert and oriented to person, place, time, and situation   Language: clear and fluent    Cranial Nerves  II: full visual fields intact   III, IV, VI: PERRL, EOMI  V, VII: facial sensation and movement intact and symmetric   VIII: hearing intact   IX, X: uvula midline, soft palate elevates normally   XI: BL shoulder shrug intact   XII: tongue midline    Motor  5/5 BUE and BLE                 Tone and bulk are normal in upper and lower limbs  No pronator drift    Sensation  Intact to light touch and pinprick in all 4 EXTs    Reflex  2+ in BL biceps and patella                                   Plantar responses downward bilaterally    Coordination  Normal FTN bilaterally    Gait  Deferred      LABS:   pending

## 2021-11-17 NOTE — H&P ADULT - NSHPPHYSICALEXAM_GEN_ALL_CORE
CONSTITUTIONAL: Well appearing, well nourished, awake, alert and in no apparent distress  ENMT: Airway patent, Nasal mucosa clear. Mouth with normal mucosa. Throat has no vesicles, no oropharyngeal exudates and uvula is midline.  EYES: Clear bilaterally, pupils equal, round and reactive to light. EOMI.  CARDIAC: Normal rate, regular rhythm.  Heart sounds S1, S2.  No murmurs, rubs or gallops   RESPIRATORY: Breath sounds clear and equal bilaterally. No wheezes, rhales or rhonchi  ABDOMINAL: Nontender to palpation. No rebound/ guarding   MUSCULOSKELETAL: Spine appears normal, range of motion is not limited, no muscle or joint tenderness  EXTREMITIES: No edema, cyanosis or deformity   NEUROLOGICAL: Alert and oriented, no focal deficits, no motor or sensory deficits.  SKIN: No rash, skin turgor CONSTITUTIONAL: Awake, alert and in no apparent distress  ENMT: Airway patent, Nasal mucosa clear. Mouth with normal mucosa.   EYES: Clear bilaterally, pupils equal, round and reactive to light. EOMI.  CARDIAC: Normal rate, regular rhythm.  Heart sounds S1, S2.  No murmurs, rubs or gallops   RESPIRATORY: Breath sounds clear and equal bilaterally. No wheezes, rhales or rhonchi  ABDOMINAL: Nontender to palpation. No rebound/ guarding   MUSCULOSKELETAL: Spine appears normal, range of motion is not limited, no muscle or joint tenderness  EXTREMITIES: No edema, cyanosis or deformity   NEUROLOGICAL: Alert and oriented, no focal deficits, no motor or sensory deficits   SKIN: No rash, skin turgor

## 2021-11-17 NOTE — H&P ADULT - NSHPREVIEWOFSYSTEMS_GEN_ALL_CORE
REVIEW OF SYSTEMS:    CONSTITUTIONAL: No weakness, fevers or chills  EYES/ENT: No visual changes;  No vertigo or throat pain   NECK: No pain or stiffness  RESPIRATORY: No cough, wheezing, hemoptysis; No shortness of breath  CARDIOVASCULAR: No chest pain or palpitations  GASTROINTESTINAL: No abdominal or epigastric pain. No nausea, vomiting, or hematemesis; No diarrhea or constipation. No melena or hematochezia.  GENITOURINARY: No dysuria, frequency or hematuria  NEUROLOGICAL: No numbness or weakness  Psych: No depression, anxiety  SKIN: No itching, rashes REVIEW OF SYSTEMS:    CONSTITUTIONAL: No weakness, fevers or chills  EYES/ENT: No visual changes;  No vertigo or throat pain   NECK: No pain or stiffness  RESPIRATORY: No cough, wheezing, hemoptysis; No shortness of breath  CARDIOVASCULAR: No chest pain or palpitations  GASTROINTESTINAL: No abdominal or epigastric pain. No nausea, vomiting, or hematemesis; No diarrhea or constipation. No melena or hematochezia.  GENITOURINARY: No dysuria, frequency or hematuria  NEUROLOGICAL: per HPI   Psych: No depression, anxiety  SKIN: No itching, rashes

## 2021-11-17 NOTE — H&P ADULT - NSICDXPASTMEDICALHX_GEN_ALL_CORE_FT
PAST MEDICAL HISTORY:  No pertinent past medical history     Obesity      PAST MEDICAL HISTORY:  Diabetes mellitus     Obesity

## 2021-11-18 LAB
A1C WITH ESTIMATED AVERAGE GLUCOSE RESULT: 7.5 % — HIGH (ref 4–5.6)
COVID-19 NUCLEOCAPSID GAM AB INTERP: POSITIVE
COVID-19 NUCLEOCAPSID TOTAL GAM ANTIBODY RESULT: 163 INDEX — HIGH
COVID-19 SPIKE DOMAIN AB INTERP: POSITIVE
COVID-19 SPIKE DOMAIN ANTIBODY RESULT: >250 U/ML — HIGH
ESTIMATED AVERAGE GLUCOSE: 169 MG/DL — HIGH (ref 68–114)
GLUCOSE BLDC GLUCOMTR-MCNC: 101 MG/DL — HIGH (ref 70–99)
GLUCOSE BLDC GLUCOMTR-MCNC: 157 MG/DL — HIGH (ref 70–99)
GLUCOSE BLDC GLUCOMTR-MCNC: 89 MG/DL — SIGNIFICANT CHANGE UP (ref 70–99)
GLUCOSE BLDC GLUCOMTR-MCNC: 91 MG/DL — SIGNIFICANT CHANGE UP (ref 70–99)
SARS-COV-2 IGG+IGM SERPL QL IA: 163 INDEX — HIGH
SARS-COV-2 IGG+IGM SERPL QL IA: >250 U/ML — HIGH
SARS-COV-2 IGG+IGM SERPL QL IA: POSITIVE
SARS-COV-2 IGG+IGM SERPL QL IA: POSITIVE

## 2021-11-18 PROCEDURE — 99233 SBSQ HOSP IP/OBS HIGH 50: CPT

## 2021-11-18 PROCEDURE — 99232 SBSQ HOSP IP/OBS MODERATE 35: CPT

## 2021-11-18 PROCEDURE — 95720 EEG PHY/QHP EA INCR W/VEEG: CPT

## 2021-11-18 RX ADMIN — ENOXAPARIN SODIUM 40 MILLIGRAM(S): 100 INJECTION SUBCUTANEOUS at 12:19

## 2021-11-18 NOTE — EEG REPORT - NS EEG TEXT BOX
[facility]  Epilepsy Monitoring Unit Report    [facility]: [address] [place] [address], Phone [phone]  [facility]: [address] [place], [place] [address], [place] [address], Phone [phone]  [place] Office: [address] [facility] [address], [address], [place] [address] Phone [phone]    [facility]: [address] [place] [address], Phone [phone]  [place] Office: [address] [place] [address], Phone [phone]    Patient Name: Leda Isidro    Age: 56 year, : 1965  Patient ID: -, MRN #: -, [place]: -    Physician Ordering Inpatient EEG: -  Referral Source to EMU: elective admission – Dr. Brewer    EMU Study Started: 15:53 on 21    EMU Study Ended: pending discharge    Study Information:    EEG Recording Technique:  The patient underwent continuous Video-EEG monitoring, using Telemetry System hardware on the XLTek Digital System. EEG and video data were stored on a computer hard drive with important events saved in digital archive files. The material was reviewed by a physician (electroencephalographer / epileptologist) on a daily basis. Kalin and seizure detection algorithms were utilized and reviewed. An EEG Technician attended to the patient, and was available throughout daytime work hours.  The epilepsy center neurologist was available in person or on call 24-hours per day.    EEG Placement and Labeling of Electrodes:  The EEG was performed utilizing 20 channel referential EEG connections (coronal over temporal over parasagittal montage) using all standard 10-20 electrode placements with EKG, with additional electrodes placed in the inferior temporal region using the modified 10-10 montage electrode placements for elective admissions, or if deemed necessary. Recording was at a sampling rate of 256 samples per second per channel. Time synchronized digital video recording was done simultaneously with EEG recording. A low light infrared camera was used for low light recording.         History:  This is a 56 year-old female with a history of DM and left temporoparietal lesion s/p resection 21 (Dr. Levy) who presents to EMU to establish the first diagnosis of a seizure disorder.    Pt presented to [facility] on  with new onset BTC seizures x2 within 24hrs in setting of severe DKA (BS >500s). She was initially started on LEV, but quickly tapered off. Continue video EEG off LEV was normal. MRI brain revealed an enhancing lesion in the left temporoparietal lobe that was concerning for malignancy. CT C/A/P negative. Lesion was resected on  by Dr. Levy, with pathology positive for reactive gliosis. Pt was discharged on LEV 500mg BID for sz ppx. MRI C- and T-spine unremarkable. No further sz. Reports blurry vision in the right visual field that requires multiple eye blinking to regain visual clarity. In preparation for this EMU admission, LEV decreased to 250mg BID, with plan to be held while undergoing EEG study.    SEIZURE RISK FACTORS:  Hyperglycemia. Left temporoparietal lesion s/p resection 2021. Patient was a product of normal pregnancy and delivery. No history of febrile seizure, TBI, CNS infection, or FH of seizures.     CURRENT AED:  LEV 250mg BID – started 2021    PREVIOUS AED:  None    IMAGING:   Multiple MRIs, selected few below.    MRI brain w/wo 10/30/21 ([place]): Normal temporal evolution of previously seen hemorrhage in the left occipital surgical bed. Decreased edema surrounding the surgical cavity. Somewhat thickened peripheral enhancement of the surgical bed. Nodular enhancement along the mesial aspect of the surgical cavity measures 0.8 x 0.8 x 0.9 cm (maximal anterior-posterior by transverse by craniocaudad dimensions). Nodular enhancement along the mesial aspect of the surgical cavity measures 2.2 x 0.8 x 0.9 cm. Nonspecific 1.2 x 0.9 x 0.9 cm focus of enhancement superior to the surgical cavity. While findings may represent postsurgical changes, presence of neoplasm is not excluded.     MRI C- and T-spines w/wo 10/30/21 ([place]): unremarkable     MRI brain w/ contrast 9/15/21 ([facility]): Corresponding to the FLAIR hyperintensity and diffuse diffusion restriction signal in the left temporoparietal region, there is a heterogeneous and peripherally enhancing lesion measuring 2.5 x 1.5 x 2.9 cm in AP, TRV and craniocaudal dimensions. Superoposterior to the lesion and best seen on series 5 image 16 series 3 image 18 there are small ring-enhancing lesions, largest of which measures 4 mm. There is extension of the lesion close to the posterior horn of the left lateral ventricle without extension into the ependyma. No additional lesions are identified in the brain parenchyma. IMPRESSION: There is a peripherally enhancing mass with satellite smaller lesions as described, most likely represents primary versus metastatic disease however, the former is more likely secondary to absence of large vasogenic edema.    MRI brain w/o, epilepsy protocol 21 ([facility]): Abnormal signal in the left parietotemporal lobe most likely represents an acute infarction. There is no hemorrhagic conversion. There is no midline shift or herniation. Additional differential diagnostic consideration is post seizure focus. Further correlation with MRI of the brain with contrast is suggested to exclude underlying lesion.    NEUROPHYSIOLOGY:  None-elective EMU  – 21 ([facility]): normal; off AED  cvEEG  – 9/10/21 ([facility]): improvement from severe to mild-mod gen slowing with propofol taper    NEUROPSYCHOLOGY:   None    Home Antiepileptic Medication and Device  LEV 250mg BID    Interpretation:    Start Date: 2021 – Day 1                                Start Time – 15:53       Duration – 15h 05m    Daily EEG Visual Analysis  Findings: The background was continuous and reactive. During wakefulness, the posterior dominant rhythm consisted of asymmetric, well-modulated 9 Hz activity of higher amplitude in the left hemisphere due to breach effect.    Background Slowing:  No generalized background slowing was present.    Focal Slowing:   Near continuous theta/delta slowing in the left posterior quadrant (max P3/O1/T7/P7/T9/P9).    Sleep Background:  Drowsiness was characterized by fragmentation, attenuation, and slowing of the background activity.    Sleep was characterized by the presence of vertex waves, symmetric sleep spindles and K-complexes.    Other Non-Epileptiform Findings:  Breach effect max in the left posterior quadrant characterized by higher amplitude, sharply contoured waves and fast activities.    Interictal Epileptiform Activity:   None were present.    Events:  No events or seizures recorded.    Artifacts:  Intermittent myogenic and movement artifacts were noted.    ECG:  The heart rate on single channel ECG was predominantly between 70-80 BPM.    AED:  LEV 250mg/DC    EEG Summary:  Abnormal EEG in the awake, drowsy and asleep states.  - Near continuous theta/delta slowing in the left posterior quadrant (max P3/O1/T7/P7/T9/P9).  - Breach effect max in the left posterior quadrant characterized by higher amplitude, sharply contoured waves and fast activities.    Impression/Clinical Correlate:   – : No events or seizures were recorded.    Interictal findings suggest structural abnormality and skull defect max in the left posterior quadrant.     ________________________________________    Prudence Brewer MD  Director, Epilepsy/EMU - [facility]

## 2021-11-18 NOTE — PROGRESS NOTE ADULT - ASSESSMENT
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 Levy) who presents to EMU to evaluate for seizures.     Seizure disorder  Hx of left temporoparietal lesion s/p resection 9/21/21  cvEEG reviewed by Dr Brewer (Epilepsy), continue cvEEG for another day   Takes Keppra at home 500 mg BID, tapered off Keppra here   Telemetry   Seizure precautions, bed rail up at all times, bed rail padding  OOB only with supervision and assist    Diabetes Mellitus type 2  Takes Metformin at home  ISS   Monitor BG     DVT ppx: Lovenox     Dispo: likely DC in AM  56 year-old F with PMH of DM, 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 Levy) who presents to EMU to evaluate for seizures.     Seizure disorder  Hx of left temporoparietal lesion s/p resection 9/21/21  cvEEG reviewed by Dr Brewer (Epilepsy), continue cvEEG for another day   Takes Keppra at home 500 mg BID, tapered off Keppra here   Telemetry   Seizure precautions, bed rail up at all times, bed rail padding  OOB only with supervision and assist    Diabetes Mellitus type 2  Takes Metformin at home  HbA1C 7.5  ISS   Monitor BG     DVT ppx: Lovenox     Dispo: likely DC in AM

## 2021-11-18 NOTE — PROGRESS NOTE ADULT - ASSESSMENT
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 Levy) who presents to EMU to establish the first diagnosis of a seizure disorder. Personally reviewed all imagines, labs, EEG and other studies.    No epileptiform discharges or seizure on EEG thus far.      Diagnosis on Discharge Note: seizure       Recommendation:  - cvEEG  - holding levetiracetam, will resume at time of discharge   - tele monitor  - bed rail up at all times  - bed rail padding  - OOB only with supervision and assist  - seizure precaution  - anticipate discharge home tomorrow on same antiepileptic medication   - follow-up with me in clinic: [facility] ([phone]), [facility] [place] [address]       Will continue to follow with you.   ______________________  Prudence Brewer MD   Director, Epilepsy/EMU - [facility]   Epilepsy Consult #: [phone] ([phone])

## 2021-11-19 ENCOUNTER — TRANSCRIPTION ENCOUNTER (OUTPATIENT)
Age: 56
End: 2021-11-19

## 2021-11-19 VITALS
HEART RATE: 62 BPM | TEMPERATURE: 98 F | OXYGEN SATURATION: 97 % | SYSTOLIC BLOOD PRESSURE: 99 MMHG | RESPIRATION RATE: 18 BRPM | DIASTOLIC BLOOD PRESSURE: 65 MMHG

## 2021-11-19 LAB — GLUCOSE BLDC GLUCOMTR-MCNC: 116 MG/DL — HIGH (ref 70–99)

## 2021-11-19 PROCEDURE — 83036 HEMOGLOBIN GLYCOSYLATED A1C: CPT

## 2021-11-19 PROCEDURE — 95700 EEG CONT REC W/VID EEG TECH: CPT

## 2021-11-19 PROCEDURE — 85025 COMPLETE CBC W/AUTO DIFF WBC: CPT

## 2021-11-19 PROCEDURE — 84100 ASSAY OF PHOSPHORUS: CPT

## 2021-11-19 PROCEDURE — 95714 VEEG EA 12-26 HR UNMNTR: CPT

## 2021-11-19 PROCEDURE — 80053 COMPREHEN METABOLIC PANEL: CPT

## 2021-11-19 PROCEDURE — 99239 HOSP IP/OBS DSCHRG MGMT >30: CPT

## 2021-11-19 PROCEDURE — 86769 SARS-COV-2 COVID-19 ANTIBODY: CPT

## 2021-11-19 PROCEDURE — 99233 SBSQ HOSP IP/OBS HIGH 50: CPT

## 2021-11-19 PROCEDURE — 82962 GLUCOSE BLOOD TEST: CPT

## 2021-11-19 PROCEDURE — 81025 URINE PREGNANCY TEST: CPT

## 2021-11-19 PROCEDURE — 83735 ASSAY OF MAGNESIUM: CPT

## 2021-11-19 RX ORDER — LEVETIRACETAM 250 MG/1
1000 TABLET, FILM COATED ORAL ONCE
Refills: 0 | Status: COMPLETED | OUTPATIENT
Start: 2021-11-19 | End: 2021-11-19

## 2021-11-19 RX ADMIN — LEVETIRACETAM 400 MILLIGRAM(S): 250 TABLET, FILM COATED ORAL at 11:09

## 2021-11-19 NOTE — DISCHARGE NOTE PROVIDER - NSDCFUSCHEDAPPT_GEN_ALL_CORE_FT
ANUSHA ALBERT ; 01/10/2022 ; [facility] [address] [place], [place] [address]ANUSHA BOSWELL ; 02/14/2022 ; [facility] [address] [place] [address]

## 2021-11-19 NOTE — DISCHARGE NOTE PROVIDER - NSDCMRMEDTOKEN_GEN_ALL_CORE_FT
levETIRAcetam 500 mg oral tablet: 1 tab(s) orally 2 times a day   metFORMIN 500 mg oral tablet: 1 tab(s) orally 2 times a day

## 2021-11-19 NOTE — EEG REPORT - NS EEG TEXT BOX
[facility]  Epilepsy Monitoring Unit Report    [facility]: [address] [place] [address], Phone [phone]  [facility]: [address] [place], [place] [address] [address], Phone [phone]  [place] Office: [address], [address], [place] [address] Phone [phone]    [facility]: [address] [place] [address], Phone [phone]  [place] Office: [address] [place] [address], Phone [phone]    Patient Name: Leda Isidro    Age: 56 year, : 1965  Patient ID: -, MRN #: -, [place]: -    Physician Ordering Inpatient EEG: -  Referral Source to EMU: elective admission – Dr. Brewer    EMU Study Started: 15:53 on 21    EMU Study Ended: pending discharge    Study Information:    EEG Recording Technique:  The patient underwent continuous Video-EEG monitoring, using Telemetry System hardware on the XLTek Digital System. EEG and video data were stored on a computer hard drive with important events saved in digital archive files. The material was reviewed by a physician (electroencephalographer / epileptologist) on a daily basis. Kalin and seizure detection algorithms were utilized and reviewed. An EEG Technician attended to the patient, and was available throughout daytime work hours.  The epilepsy center neurologist was available in person or on call 24-hours per day.    EEG Placement and Labeling of Electrodes:  The EEG was performed utilizing 20 channel referential EEG connections (coronal over temporal over parasagittal montage) using all standard 10-20 electrode placements with EKG, with additional electrodes placed in the inferior temporal region using the modified 10-10 montage electrode placements for elective admissions, or if deemed necessary. Recording was at a sampling rate of 256 samples per second per channel. Time synchronized digital video recording was done simultaneously with EEG recording. A low light infrared camera was used for low light recording.         History:  This is a 56 year-old female with a history of DM and left temporoparietal lesion s/p resection 21 (Dr. Levy) who presents to EMU to establish the first diagnosis of a seizure disorder.    Pt presented to [facility] on  with new onset BTC seizures x2 within 24hrs in setting of severe DKA (BS >500s). She was initially started on LEV, but quickly tapered off. Continue video EEG off LEV was normal. MRI brain revealed an enhancing lesion in the left temporoparietal lobe that was concerning for malignancy. CT C/A/P negative. Lesion was resected on  by Dr. Levy, with pathology positive for reactive gliosis. Pt was discharged on LEV 500mg BID for sz ppx. MRI C- and T-spine unremarkable. No further sz. Reports blurry vision in the right visual field that requires multiple eye blinking to regain visual clarity. In preparation for this EMU admission, LEV decreased to 250mg BID, with plan to be held while undergoing EEG study.    SEIZURE RISK FACTORS:  Hyperglycemia. Left temporoparietal lesion s/p resection 2021. Patient was a product of normal pregnancy and delivery. No history of febrile seizure, TBI, CNS infection, or FH of seizures.     CURRENT AED:  LEV 250mg BID – started 2021    PREVIOUS AED:  None    IMAGING:   Multiple MRIs, selected few below.    MRI brain w/wo 10/30/21 ([facility]): Normal temporal evolution of previously seen hemorrhage in the left occipital surgical bed. Decreased edema surrounding the surgical cavity. Somewhat thickened peripheral enhancement of the surgical bed. Nodular enhancement along the mesial aspect of the surgical cavity measures 0.8 x 0.8 x 0.9 cm (maximal anterior-posterior by transverse by craniocaudad dimensions). Nodular enhancement along the mesial aspect of the surgical cavity measures 2.2 x 0.8 x 0.9 cm. Nonspecific 1.2 x 0.9 x 0.9 cm focus of enhancement superior to the surgical cavity. While findings may represent postsurgical changes, presence of neoplasm is not excluded.     MRI C- and T-spines w/wo 10/30/21 ([facility]): unremarkable     MRI brain w/ contrast 9/15/21 ([facility]): Corresponding to the FLAIR hyperintensity and diffuse diffusion restriction signal in the left temporoparietal region, there is a heterogeneous and peripherally enhancing lesion measuring 2.5 x 1.5 x 2.9 cm in AP, TRV and craniocaudal dimensions. Superoposterior to the lesion and best seen on series 5 image 16 series 3 image 18 there are small ring-enhancing lesions, largest of which measures 4 mm. There is extension of the lesion close to the posterior horn of the left lateral ventricle without extension into the ependyma. No additional lesions are identified in the brain parenchyma. IMPRESSION: There is a peripherally enhancing mass with satellite smaller lesions as described, most likely represents primary versus metastatic disease however, the former is more likely secondary to absence of large vasogenic edema.    MRI brain w/o, epilepsy protocol 21 ([facility]): Abnormal signal in the left parietotemporal lobe most likely represents an acute infarction. There is no hemorrhagic conversion. There is no midline shift or herniation. Additional differential diagnostic consideration is post seizure focus. Further correlation with MRI of the brain with contrast is suggested to exclude underlying lesion.    NEUROPHYSIOLOGY:  None-elective EMU  – 21 ([facility]): normal; off AED  cvEEG  – 9/10/21 ([facility]): improvement from severe to mild-mod gen slowing with propofol taper    NEUROPSYCHOLOGY:   None    Home Antiepileptic Medication and Device  LEV 250mg BID    Interpretation:    Start Date: 2021 – Day 1                                Start Time – 15:53       Duration – 15h 05m    Daily EEG Visual Analysis  Findings: The background was continuous and reactive. During wakefulness, the posterior dominant rhythm consisted of asymmetric, well-modulated 9 Hz activity of higher amplitude in the left hemisphere due to breach effect.    Background Slowing:  No generalized background slowing was present.    Focal Slowing:   Near continuous theta/delta slowing in the left posterior quadrant (max P3/O1/T7/P7/T9/P9).    Sleep Background:  Drowsiness was characterized by fragmentation, attenuation, and slowing of the background activity.    Sleep was characterized by the presence of vertex waves, symmetric sleep spindles and K-complexes.    Other Non-Epileptiform Findings:  Breach effect max in the left posterior quadrant characterized by higher amplitude, sharply contoured waves and fast activities.    Interictal Epileptiform Activity:   None were present.    Events:  No events or seizures recorded.    Artifacts:  Intermittent myogenic and movement artifacts were noted.    ECG:  The heart rate on single channel ECG was predominantly between 70-80 BPM.    AED:  LEV 250mg/DC    Start Date: 2021 – Day 2                                       Start Time – 08:00      Duration – 24h    Daily EEG Visual Analysis  FINDINGS:  The background, artifacts and HR on single channel ECG were similar as previous day.    Interictal Epileptiform Activity:   None were present.     Events:  No events or seizures recorded.    AED:  None     EEG Summary:  Abnormal EEG in the awake, drowsy and asleep states.  - Near continuous theta/delta slowing in the left posterior quadrant (max P3/O1/T7/P7/T9/P9).  - Breach effect max in the left posterior quadrant characterized by higher amplitude, sharply contoured waves and fast activities.    Impression/Clinical Correlate:   – : No events or seizures were recorded.   – : No events or seizures were recorded.    During this EMU admission, no events or seizures were recorded. Interictal findings suggest structural abnormality and skull defect max in the left posterior quadrant.     ________________________________________    Prudence Brewer MD  Director, Epilepsy/EMU - [facility]

## 2021-11-19 NOTE — DISCHARGE NOTE PROVIDER - CARE PROVIDER_API CALL
Prudence Brewer)  EEGEpilepsy; Neurology  [address] [place] [address]  Phone: [phone]  Fax: [phone]  Follow Up Time: 1 week    PCP,   Phone: (   )    -  Fax: (   )    -  Follow Up Time: 1 week

## 2021-11-19 NOTE — DISCHARGE NOTE PROVIDER - HOSPITAL COURSE
56 year-old female with PMHx of DM, 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 Levy) who presents to EMU to evaluate for seizures. Pt was admitted at [facility] in Sep 21 with new onset seizures and was found to have lesion in the left temporoparietal lobe that was concerning for malignancy. Now s/p resection, pathology showing reactive gliosis. she was started on Keppra and has not had any seizures since discharge. Patient admitted for cvEEG. Tapered off keppra and monitored on cvEEG x 48 hours, which revealed 56 year-old female with PMHx of DM, 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 Levy) who presents to EMU to evaluate for seizures. Pt was admitted at [facility] in Sep 21 with new onset seizures and was found to have lesion in the left temporoparietal lobe that was concerning for malignancy. Now s/p resection, pathology showing reactive gliosis. she was started on Keppra and has not had any seizures since discharge. Patient admitted for cvEEG. Tapered off keppra and monitored on cvEEG x 48 hours, which revealed No epileptiform discharges or seizures. Pt was loaded with Keppra IV 100mg and will be discharged on Keppra 500 mg BID.     Vital Signs Last 24 Hrs  T(C): 36.6 (19 Nov 2021 04:57), Max: 36.7 (18 Nov 2021 17:50)  T(F): 97.9 (19 Nov 2021 04:57), Max: 98.1 (18 Nov 2021 17:50)  HR: 62 (19 Nov 2021 04:57) (62 - 67)  BP: 99/65 (19 Nov 2021 04:57) (99/65 - 111/74)  BP(mean): --  RR: 18 (19 Nov 2021 04:57) (18 - 18)  SpO2: 97% (19 Nov 2021 04:57) (97% - 99%)    Physical Exam:   CONSTITUTIONAL: NAD, lying in bed   ENMT: Moist oral mucosa  RESPIRATORY: Normal respiratory effort; lungs are clear to auscultation bilaterally  CARDIOVASCULAR: Regular rate and rhythm, normal S1 and S2, No lower extremity edema  ABDOMEN: Nontender to palpation, normoactive bowel sounds  MUSCULOSKELETAL:  No clubbing or cyanosis of digits  PSYCH: A+O to person, place, and time; affect appropriate  NEUROLOGY: No gross sensory or motor deficits   SKIN: No rashes    DC time: 35 mins

## 2021-11-19 NOTE — DISCHARGE NOTE PROVIDER - NSDCCPCAREPLAN_GEN_ALL_CORE_FT
PRINCIPAL DISCHARGE DIAGNOSIS  Diagnosis: Seizure  Assessment and Plan of Treatment: Please take your medications as prescribed  Follow up with Neurology

## 2021-11-19 NOTE — PROGRESS NOTE ADULT - SUBJECTIVE AND OBJECTIVE BOX
[facility]                                                                     MD Leda Desai DO                                              Epilepsy Consult #: [phone] ([phone])                                               Office: [address] [place] [address], [address]                                                 Phone: [phone]; Fax: [phone]                            ==============================================    EPILEPSY FOLLOW-UP NOTE      INTERVAL HISTORY:  No event overnight.     MEDICATIONS  (STANDING):  dextrose 40% Gel 15 Gram(s) Oral once  dextrose 5%. 1000 milliLiter(s) (50 mL/Hr) IV Continuous <Continuous>  dextrose 5%. 1000 milliLiter(s) (100 mL/Hr) IV Continuous <Continuous>  dextrose 50% Injectable 25 Gram(s) IV Push once  dextrose 50% Injectable 12.5 Gram(s) IV Push once  dextrose 50% Injectable 25 Gram(s) IV Push once  enoxaparin Injectable 40 milliGRAM(s) SubCutaneous daily  glucagon  Injectable 1 milliGRAM(s) IntraMuscular once  insulin lispro (ADMELOG) corrective regimen sliding scale   SubCutaneous three times a day before meals  insulin lispro (ADMELOG) corrective regimen sliding scale   SubCutaneous at bedtime    Vital Signs Last 24 Hrs  T(C): 36.6 (19 Nov 2021 04:57), Max: 36.7 (18 Nov 2021 17:50)  T(F): 97.9 (19 Nov 2021 04:57), Max: 98.1 (18 Nov 2021 17:50)  HR: 62 (19 Nov 2021 04:57) (62 - 62)  BP: 99/65 (19 Nov 2021 04:57) (99/65 - 111/74)  BP(mean): --  RR: 18 (19 Nov 2021 04:57) (18 - 18)  SpO2: 97% (19 Nov 2021 04:57) (97% - 99%)    GENERAL PHYSICAL EXAM:  GEN: no distress   HEENT: NCAT, OP clear  EYES: sclera white, conjunctiva clear, no nystagmus  NECK: supple  CV: RRR, no murmur     		  PULM: CTAB, no wheezing  ABD: soft, +BS, NT  EXT: peripheral pulse intact, no cyanosis  MSK: muscle tone and strength normal  SKIN: warm, dry    NEUROLOGICAL EXAM:  Mental Status  Orientation: alert and oriented to person, place, time, and situation   Language: clear and fluent    Cranial Nerves  II: full visual fields intact   III, IV, VI: PERRL, EOMI  V, VII: facial sensation and movement intact and symmetric   VIII: hearing intact   IX, X: uvula midline, soft palate elevates normally   XI: BL shoulder shrug intact   XII: tongue midline    Motor  5/5 BUE and BLE                 Tone and bulk are normal in upper and lower limbs  No pronator drift    Sensation  Intact to light touch and pinprick in all 4 EXTs    Reflex  2+ in BL biceps and patella                                    Plantar responses downward bilaterally    Coordination  Normal FTN bilaterally    Gait  Deferred       LABS:                          11.9   7.57  )-----------( 298      ( 17 Nov 2021 18:25 )             37.6     11-17    144  |  106  |  12.6  ----------------------------<  87  4.2   |  28.0  |  0.91    Ca    9.6      17 Nov 2021 17:12  Phos  3.4     11-17  Mg     2.1     11-17    TPro  6.4<L>  /  Alb  4.2  /  TBili  <0.2<L>  /  DBili  x   /  AST  9   /  ALT  8   /  AlkPhos  60  11-17    CAPILLARY BLOOD GLUCOSE      POCT Blood Glucose.: 101 mg/dL (18 Nov 2021 07:19)  POCT Blood Glucose.: 102 mg/dL (17 Nov 2021 21:30)  POCT Blood Glucose.: 82 mg/dL (17 Nov 2021 16:52)    LIVER FUNCTIONS - ( 17 Nov 2021 17:12 )  Alb: 4.2 g/dL / Pro: 6.4 g/dL / ALK PHOS: 60 U/L / ALT: 8 U/L / AST: 9 U/L / GGT: x                 OTHER IMAGING AND STUDIES:    U 11/17 - 11/19/21:  EEG Summary:  Abnormal EEG in the awake, drowsy and asleep states.  - Near continuous theta/delta slowing in the left posterior quadrant (max P3/O1/T7/P7/T9/P9).  - Breach effect max in the left posterior quadrant characterized by higher amplitude, sharply contoured waves and fast activities.    Impression/Clinical Correlate:  11/17 – 11/18: No events or seizures were recorded.  11/18 – 11/19: No events or seizures were recorded.    During this EMU admission, no events or seizures were recorded. Interictal findings suggest structural abnormality and skull defect max in the left posterior quadrant. 
[facility] Division of Hospital Medicine    Chief Complaint:  Seizure     SUBJECTIVE / OVERNIGHT EVENTS:  Pt says she feels well, no complaints     Patient denies chest pain, SOB, abd pain, N/V, fever, chills, dysuria or any other complaints. All remainder ROS negative.     MEDICATIONS  (STANDING):  dextrose 40% Gel 15 Gram(s) Oral once  dextrose 5%. 1000 milliLiter(s) (50 mL/Hr) IV Continuous <Continuous>  dextrose 5%. 1000 milliLiter(s) (100 mL/Hr) IV Continuous <Continuous>  dextrose 50% Injectable 25 Gram(s) IV Push once  dextrose 50% Injectable 12.5 Gram(s) IV Push once  dextrose 50% Injectable 25 Gram(s) IV Push once  enoxaparin Injectable 40 milliGRAM(s) SubCutaneous daily  glucagon  Injectable 1 milliGRAM(s) IntraMuscular once  insulin lispro (ADMELOG) corrective regimen sliding scale   SubCutaneous three times a day before meals  insulin lispro (ADMELOG) corrective regimen sliding scale   SubCutaneous at bedtime    MEDICATIONS  (PRN):  acetaminophen     Tablet .. 650 milliGRAM(s) Oral every 6 hours PRN Temp greater or equal to 38C (100.4F), Mild Pain (1 - 3)  aluminum hydroxide/magnesium hydroxide/simethicone Suspension 30 milliLiter(s) Oral every 4 hours PRN Dyspepsia  melatonin 3 milliGRAM(s) Oral at bedtime PRN Insomnia        I&O's Summary      PHYSICAL EXAM:  Vital Signs Last 24 Hrs  T(C): 36.4 (18 Nov 2021 04:39), Max: 37 (17 Nov 2021 15:05)  T(F): 97.5 (18 Nov 2021 04:39), Max: 98.6 (17 Nov 2021 15:05)  HR: 69 (18 Nov 2021 04:39) (69 - 80)  BP: 100/68 (18 Nov 2021 04:39) (100/68 - 107/62)  BP(mean): --  RR: 18 (18 Nov 2021 04:39) (16 - 18)  SpO2: 99% (18 Nov 2021 04:39) (99% - 99%)      CONSTITUTIONAL: NAD, lying in bed   ENMT: Moist oral mucosa  RESPIRATORY: Normal respiratory effort; lungs are clear to auscultation bilaterally  CARDIOVASCULAR: Regular rate and rhythm, normal S1 and S2, No lower extremity edema  ABDOMEN: Nontender to palpation, normoactive bowel sounds  MUSCULOSKELETAL:  No clubbing or cyanosis of digits  PSYCH: A+O to person, place, and time; affect appropriate  NEUROLOGY: No gross sensory or motor deficits   SKIN: No rashes      LABS:                        11.9   7.57  )-----------( 298      ( 17 Nov 2021 18:25 )             37.6     11-17    144  |  106  |  12.6  ----------------------------<  87  4.2   |  28.0  |  0.91    Ca    9.6      17 Nov 2021 17:12  Phos  3.4     11-17  Mg     2.1     11-17    TPro  6.4<L>  /  Alb  4.2  /  TBili  <0.2<L>  /  DBili  x   /  AST  9   /  ALT  8   /  AlkPhos  60  11-17            CAPILLARY BLOOD GLUCOSE      POCT Blood Glucose.: 101 mg/dL (18 Nov 2021 07:19)  POCT Blood Glucose.: 102 mg/dL (17 Nov 2021 21:30)  POCT Blood Glucose.: 82 mg/dL (17 Nov 2021 16:52)    
                                                                     [facility]                                                                     MD Leda Desai,                                               Epilepsy Consult #: [phone] ([phone])                                               Office: [address] [place] [address], [address]                                                 Phone: [phone]; Fax: [phone]                            ==============================================    EPILEPSY FOLLOW-UP NOTE      INTERVAL HISTORY:  No epileptiform discharges or seizure on cvEEG.    MEDICATIONS:   acetaminophen     Tablet .. 650 milliGRAM(s) Oral every 6 hours PRN Temp greater or equal to 38C (100.4F), Mild Pain (1 - 3)  aluminum hydroxide/magnesium hydroxide/simethicone Suspension 30 milliLiter(s) Oral every 4 hours PRN Dyspepsia  dextrose 40% Gel 15 Gram(s) Oral once  dextrose 5%. 1000 milliLiter(s) (50 mL/Hr) IV Continuous <Continuous>  dextrose 5%. 1000 milliLiter(s) (100 mL/Hr) IV Continuous <Continuous>  dextrose 50% Injectable 25 Gram(s) IV Push once  dextrose 50% Injectable 12.5 Gram(s) IV Push once  dextrose 50% Injectable 25 Gram(s) IV Push once  enoxaparin Injectable 40 milliGRAM(s) SubCutaneous daily  glucagon  Injectable 1 milliGRAM(s) IntraMuscular once  insulin lispro (ADMELOG) corrective regimen sliding scale   SubCutaneous three times a day before meals  insulin lispro (ADMELOG) corrective regimen sliding scale   SubCutaneous at bedtime  melatonin 3 milliGRAM(s) Oral at bedtime PRN Insomnia    LAST 24-HR VITALS:  T(C): 36.4 (11-18-21 @ 04:39), Max: 37 (11-17-21 @ 15:05)  HR: 69 (11-18-21 @ 04:39) (69 - 80)  BP: 100/68 (11-18-21 @ 04:39) (100/68 - 107/62)  ABP: --  RR: 18 (11-18-21 @ 04:39) (16 - 18)  SpO2: 99% (11-18-21 @ 04:39) (99% - 99%)  CVP(cm H2O): --    GENERAL PHYSICAL EXAM:  GEN: no distress   HEENT: NCAT, OP clear  EYES: sclera white, conjunctiva clear, no nystagmus  NECK: supple  CV: RRR, no murmur     		  PULM: CTAB, no wheezing  ABD: soft, +BS, NT  EXT: peripheral pulse intact, no cyanosis  MSK: muscle tone and strength normal  SKIN: warm, dry    NEUROLOGICAL EXAM:  Mental Status  Orientation: alert and oriented to person, place, time, and situation   Language: clear and fluent    Cranial Nerves  II: full visual fields intact   III, IV, VI: PERRL, EOMI  V, VII: facial sensation and movement intact and symmetric   VIII: hearing intact   IX, X: uvula midline, soft palate elevates normally   XI: BL shoulder shrug intact   XII: tongue midline    Motor  5/5 BUE and BLE                 Tone and bulk are normal in upper and lower limbs  No pronator drift    Sensation  Intact to light touch and pinprick in all 4 EXTs    Reflex  2+ in BL biceps and patella                                    Plantar responses downward bilaterally    Coordination  Normal FTN bilaterally    Gait  Deferred       LABS:                          11.9   7.57  )-----------( 298      ( 17 Nov 2021 18:25 )             37.6     11-17    144  |  106  |  12.6  ----------------------------<  87  4.2   |  28.0  |  0.91    Ca    9.6      17 Nov 2021 17:12  Phos  3.4     11-17  Mg     2.1     11-17    TPro  6.4<L>  /  Alb  4.2  /  TBili  <0.2<L>  /  DBili  x   /  AST  9   /  ALT  8   /  AlkPhos  60  11-17    CAPILLARY BLOOD GLUCOSE      POCT Blood Glucose.: 101 mg/dL (18 Nov 2021 07:19)  POCT Blood Glucose.: 102 mg/dL (17 Nov 2021 21:30)  POCT Blood Glucose.: 82 mg/dL (17 Nov 2021 16:52)    LIVER FUNCTIONS - ( 17 Nov 2021 17:12 )  Alb: 4.2 g/dL / Pro: 6.4 g/dL / ALK PHOS: 60 U/L / ALT: 8 U/L / AST: 9 U/L / GGT: x                 OTHER IMAGING AND STUDIES:    EMU 11/17 - 11/18/21:  EEG Summary:  Abnormal EEG in the awake, drowsy and asleep states.  - Near continuous theta/delta slowing in the left posterior quadrant (max P3/O1/T7/P7/T9/P9).  - Breach effect max in the left posterior quadrant characterized by higher amplitude, sharply contoured waves and fast activities.    Impression/Clinical Correlate:  11/17 – 11/18: No events or seizures were recorded.    Interictal findings suggest structural abnormality and skull defect max in the left posterior quadrant.

## 2021-11-19 NOTE — DISCHARGE NOTE NURSING/CASE MANAGEMENT/SOCIAL WORK - PATIENT PORTAL LINK FT
You can access the FollowMyHealth Patient Portal offered by [facility] by registering at the following website: [email or web address]. By joining [employer]’s FollowMyHealth portal, you will also be able to view your health information using other applications (apps) compatible with our system.

## 2021-11-19 NOTE — DISCHARGE NOTE NURSING/CASE MANAGEMENT/SOCIAL WORK - NSDCVIVACCINE_GEN_ALL_CORE_FT
COVID-19 vaccine, vector-nr, rS-Ad26, PF, 0.5 mL (Marlon); 13-Sep-2021 13:19; Kamilah Kim (RN); Marlon; [id number] (Exp. Date: 21-Sep-2021); IntraMuscular; Deltoid Left.; 0.5 milliLiter(s);

## 2021-11-19 NOTE — DISCHARGE NOTE PROVIDER - PROVIDER TOKENS
PROVIDER:[TOKEN:[[id number]],FOLLOWUP:[1 week]],FREE:[LAST:[PCP],PHONE:[(   )    -],FAX:[(   )    -],FOLLOWUP:[1 week]]

## 2021-11-19 NOTE — PROGRESS NOTE ADULT - ASSESSMENT
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 Levy) who presents to EMU to establish the first diagnosis of a seizure disorder. Personally reviewed all imagines, labs, EEG and other studies.    No epileptiform discharges or seizure on EEG thus far.      Diagnosis on Discharge Note: seizure       Recommendation:  - discontinue cvEEG  - bolus with levetiracetam 1000mg IV -> discharge on 500mg BID  - tele monitor  - bed rail up at all times  - bed rail padding  - OOB only with supervision and assist  - seizure precaution  - anticipate discharge home today  - follow-up with me in clinic: [facility] [place] ([phone]), [place] [address]       No acute intervention from Epilepsy at this time.  Please reconsult if needed.  ______________________  Prudence Brewer MD   Director, Epilepsy/EMU - [facility]   Epilepsy Consult #: [phone]-EPILEPSY ([phone])

## 2022-01-06 ENCOUNTER — APPOINTMENT (OUTPATIENT)
Dept: NEUROLOGY | Facility: CLINIC | Age: 57
End: 2022-01-06
Payer: MEDICAID

## 2022-01-06 DIAGNOSIS — G44.209 TENSION-TYPE HEADACHE, UNSPECIFIED, NOT INTRACTABLE: ICD-10-CM

## 2022-01-06 PROBLEM — E11.9 TYPE 2 DIABETES MELLITUS WITHOUT COMPLICATIONS: Chronic | Status: ACTIVE | Noted: 2021-11-17

## 2022-01-06 PROCEDURE — 99214 OFFICE O/P EST MOD 30 MIN: CPT | Mod: 95

## 2022-01-06 RX ORDER — GABAPENTIN 100 MG/1
100 CAPSULE ORAL
Qty: 60 | Refills: 2 | Status: ACTIVE | COMMUNITY
Start: 2022-01-06 | End: 1900-01-01

## 2022-01-11 NOTE — HISTORY OF PRESENT ILLNESS
[FreeTextEntry1] : Since last visit, patient states she has been doing well. She reports blurry vision in her right eye and continued headaches. She had an MRI brain which showed postsurgical changes, but was otherwise unremarkable. MRI C and T spine were unrevealing. Prior biopsy with nonspecific findings, but demyelinating listed within differential. Patient clinically, does not appear to have demyelinating disease, however, we discussed evaluation by MS specialist and she is in agreement with this plan. Patient states she has a history of migraines for over 10 years, but since her surgery her headaches feel different. She states her headaches are gradual onset sharp severe headaches in the back and front of her head. She states she feels she needs to lay down and sleep when it occurs. She denies photophobia, phonophobia or nausea with the headaches. She has no further complaints.

## 2022-01-11 NOTE — ASSESSMENT
[FreeTextEntry1] : 57 yo woman with headache 2/2 tension type headache vs headache due to visual deficit\par \par Referral to Neuro-ophthalmology for visual deficit\par Start gabapentin as outlined below for headache\par She was made aware of side effects of this medication and was advised to notify me if she experiences any\par She was advised to notify me for worsening symptoms.\par Referral to Dr. Nova, MS specialist given biopsy with demyelination in differential\par \par F/u in 2 months or sooner if necessary

## 2022-01-11 NOTE — PHYSICAL EXAM
[FreeTextEntry1] : Telehealth visit, exam limited\par Mental Status: AAO x 3, no dysarthria, communicating appropriately\par CN: moves eyes in all directions, VFF difficult to assess via telehealth, no gross facial asymmetry\par Motor:\par moves extremities at least 4/5 throughout\par Gait: steady\par  [General Appearance - Alert] : alert [Neck Appearance] : the appearance of the neck was normal [] : no respiratory distress [Skin Color & Pigmentation] : normal skin color and pigmentation

## 2022-01-15 NOTE — PROGRESS NOTE ADULT - SUBJECTIVE AND OBJECTIVE BOX
HPI:  54 year old female with reportedly no PMHx questionable "DKA attack" in the past per family was misdiagnosed presents to [facility] ED after being found unresponsive in pool of urine @ home yesterday. Pt reported last known normal in yesterday AM. On arrival by EMS, pt unresponsive, agonal, attempted intubation but was unsuccessful. BS in field >400. Brought to ED subsequently intubated. Labs revealing severe DKA w/ pH 6.9, serum co2 2, undetectable BS on f/s, 676 on BMP. Utox neg. CTH/chest unremarkable. ICU consulted. Seen and examined in ED. Tachycardic, normotensive, saturating 100% on 50% fio2. On 60mcg propofol, 11u insulin.  (08 Sep 2021 01:22)    INTERVAL HPI/OVERNIGHT EVENTS:  Patient seen and examined this morning with Dr. Levy. Scheduled family meetting this afternoon for any further questions from patient/family members. Pre oped for tomorrow afternoon.     Vital Signs Last 24 Hrs  T(C): 36.7 (20 Sep 2021 05:59), Max: 36.7 (19 Sep 2021 16:00)  T(F): 98.1 (20 Sep 2021 05:59), Max: 98.1 (20 Sep 2021 05:59)  HR: 60 (20 Sep 2021 05:59) (60 - 78)  BP: 96/61 (20 Sep 2021 05:59) (96/61 - 115/72)  BP(mean): --  RR: 16 (20 Sep 2021 05:59) (16 - 16)  SpO2: 97% (20 Sep 2021 05:59) (97% - 99%)    PHYSICAL EXAM:  GENERAL: NAD, well-groomed, well-developed  HEAD:  Atraumatic, normocephalic  ZORAIDA COMA SCORE: E-4 V-5 M-6 = 15       E: 4= opens eyes spontaneously 3= to voice 2= to noxious 1= no opening       V: 5= oriented 4= confused 3= inappropriate words 2= incomprehensible sounds 1= nonverbal 1T= intubated       M: 6= follows commands 5= localizes 4= withdraws 3= flexor posturing 2= extensor posturing 1= no movement  MENTAL STATUS: AAO x3; Awake; Opens eyes spontaneously; Appropriately conversant without aphasia; following simple commands  CRANIAL NERVES: Visual acuity normal for age, visual fields full to confrontation, PERRL. EOMI without nystagmus. Facial sensation intact V1-3 distribution b/l. Face symmetric w/ normal eye closure and smile, tongue midline. Hearing grossly intact. Speech clear. Head turning intact.   MOTOR: strength 5/5 b/l upper and lower extremities; no drift  SENSATION: grossly intact to light touch all extremities  CHEST/LUNG: Nonlabored breaths  HEART: +S1/+S2  SKIN: Warm, dry      LABS:             10.7   5.38  )-----------( 281      ( 19 Sep 2021 05:52 )             33.3     09-19    139  |  101  |  6.7<L>  ----------------------------<  140<H>  4.1   |  29.0  |  0.75    Ca    9.3      19 Sep 2021 05:52  Phos  3.6     09-19  Mg     1.8     09-19    TPro  5.4<L>  /  Alb  2.8<L>  /  TBili  0.3<L>  /  DBili  x   /  AST  28  /  ALT  35<H>  /  AlkPhos  51  09-19 09-19 @ 07:01  -  09-20 @ 07:00  --------------------------------------------------------  IN: 720 mL / OUT: 0 mL / NET: 720 mL        RADIOLOGY & ADDITIONAL TESTS:  MR Head w/ IV Cont (09.15.21 @ 15:08)   IMPRESSION:  Corresponding to the abnormal signal seen in the previous MRI,  There is a peripherally enhancing mass with satellite smaller lesions as described, most likely represents primary versus metastatic disease however, the former is more likely secondary to absence of large vasogenic edema. Tissue sampling is suggested.  There is no substantial intracranial arterial stenosis or large vessel occlusion.  There is no significant arterial stenosis in the neck or dissection. Discharged

## 2022-02-14 ENCOUNTER — APPOINTMENT (OUTPATIENT)
Dept: NEUROLOGY | Facility: CLINIC | Age: 57
End: 2022-02-14
Payer: MEDICAID

## 2022-02-14 DIAGNOSIS — Z51.81 ENCOUNTER FOR THERAPEUTIC DRUG LVL MONITORING: ICD-10-CM

## 2022-02-14 PROCEDURE — 99214 OFFICE O/P EST MOD 30 MIN: CPT

## 2022-02-14 NOTE — CONSULT LETTER
[Dear  ___] : Dear  [unfilled], [Sincerely,] : Sincerely, [FreeTextEntry1] : I had the pleasure of seeing your patient, ANUSHA ALBERT, in my office today. Please see my note below. \par \par If you have any questions, please do not hesitate to contact me.  [FreeTextEntry3] : Prudence Brewer MD\par Director, Epilepsy/EMU\par [facility] \par [facility] [place]\par [address] [place] [address] \par Tel: [phone]; Fax: [phone]

## 2022-02-14 NOTE — ASSESSMENT
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 Levy, pathology - reactive gliosis) with residual right homonymous hemianopsia who presents for follow-up for 2 BTC sz's in setting of severe DKA with BS > 500. Suspect 2 BTC sz's in September was provoked by hyperglycemia, but unable to exclude possibility of structural etiology (secondary to left temporoparietal lesion). But pt is now certainly at risk for developing focal epilepsy post-resection. Discussed with pt risks and benefits of staying on LEV vs taper off, and pt prefers to stay on LEV.\par \par \par - continue LEV 500mg BID \par - f/u in 4 months\par \par \par Personally reviewed all images, labs and other studies. More than 50% of time spent on counseling and educating patient on differential, workup, disease course, and treatment/management. All questions and concerns answered and addressed in detail to patient's complete satisfaction. Patient verbalized understanding and agreed to plan.\par

## 2022-02-14 NOTE — HISTORY OF PRESENT ILLNESS
[FreeTextEntry1] : PCP: Dr. Reece Bo\par \par LAST OFFICE VISIT: 11/1/21\par \par INTERIM HISTORY:\par Completed EMU 11/17 – 11/28/21. Although no IED or sz seen, patient decided to stay on LEV for sz ppx. Doing well. 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 Levy) who presents for posthospital followup for seizure.\par \par Pt presented to [facility] on 9/8 with new onset BTC seizures x2 within 24hrs in setting of severe DKA (BS >500s). She was initially started on LEV, but quickly tapered off. Continue video EEG off LEV was normal. MRI brain revealed an enhancing lesion in the left temporoparietal lobe that was concerning for malignancy. CT C/A/P negative. Lesion was resected on 9/21 by Dr. Levy, with pathology positive for reactive gliosis. Pt was discharged on LEV 500mg BID for sz ppx. MRI C- and T-spine unremarkable. No further sz. Reports blurry vision in the right visual field that requires multiple eye blinking to regain visual clarity. CURRENT ASM: LEV 500mg BID – started 9/2021.\par \par \par SEIZURE RISK FACTORS:\par Hyperglycemia. Left temporoparietal lesion s/p resection 9/2021. Patient was a product of normal pregnancy and delivery. No history of febrile seizure, TBI, CNS infection, or FH of seizures. \par \par PREVIOUS ASM:\par None\par \par IMAGING: \par Multiple MRIs, selected few below.\par \par MRI brain w/wo 10/30/21 ([place]): Normal temporal evolution of previously seen hemorrhage in the left occipital surgical bed. Decreased edema surrounding the surgical cavity. Somewhat thickened peripheral enhancement of the surgical bed. Nodular enhancement along the mesial aspect of the surgical cavity measures 0.8 x 0.8 x 0.9 cm (maximal anterior-posterior by transverse by craniocaudad dimensions). Nodular enhancement along the mesial aspect of the surgical cavity measures 2.2 x 0.8 x 0.9 cm. Nonspecific 1.2 x 0.9 x 0.9 cm focus of enhancement superior to the surgical cavity. While findings may represent postsurgical changes, presence of neoplasm is not excluded. \par \par MRI C- and T-spines w/wo 10/30/21 ([place]): unremarkable \par \par MRI brain w/ contrast 9/15/21 ([facility]): Corresponding to the FLAIR hyperintensity and diffuse diffusion restriction signal in the left temporoparietal region, there is a heterogeneous and peripherally enhancing lesion measuring 2.5 x 1.5 x 2.9 cm in AP, TRV and craniocaudal dimensions. Superoposterior to the lesion and best seen on series 5 image 16 series 3 image 18 there are small ring-enhancing lesions, largest of which measures 4 mm. There is extension of the lesion close to the posterior horn of the left lateral ventricle without extension into the ependyma. No additional lesions are identified in the brain parenchyma. IMPRESSION: There is a peripherally enhancing mass with satellite smaller lesions as described, most likely represents primary versus metastatic disease however, the former is more likely secondary to absence of large vasogenic edema.\par \par MRI brain w/o, epilepsy protocol 9/13/21 ([facility]): Abnormal signal in the left parietotemporal lobe most likely represents an acute infarction. There is no hemorrhagic conversion. There is no midline shift or herniation. Additional differential diagnostic consideration is post seizure focus. Further correlation with MRI of the brain with contrast is suggested to exclude underlying lesion.\par \par NEUROPHYSIOLOGY:\par EMU 11/17 – 11/28/21 ([facility]): 1) near-cont L PQ slowing; 2) breach L PQ\par None-elective EMU 9/13 – 9/14/21 ([facility]): normal; off AED\par cvEEG 9/8 – 9/10/21 ([facility]): improvement from severe to mild-mod gen slowing with propofol taper\par \par NEUROPSYCHOLOGY: \par none

## 2022-02-19 ENCOUNTER — LABORATORY RESULT (OUTPATIENT)
Age: 57
End: 2022-02-19

## 2022-02-28 ENCOUNTER — TRANSCRIPTION ENCOUNTER (OUTPATIENT)
Age: 57
End: 2022-02-28

## 2022-04-04 ENCOUNTER — RX RENEWAL (OUTPATIENT)
Age: 57
End: 2022-04-04

## 2022-04-15 ENCOUNTER — APPOINTMENT (OUTPATIENT)
Dept: NEUROLOGY | Facility: CLINIC | Age: 57
End: 2022-04-15
Payer: MEDICAID

## 2022-04-15 VITALS
DIASTOLIC BLOOD PRESSURE: 80 MMHG | HEART RATE: 83 BPM | HEIGHT: 61 IN | TEMPERATURE: 97.3 F | WEIGHT: 271 LBS | SYSTOLIC BLOOD PRESSURE: 122 MMHG | OXYGEN SATURATION: 97 % | BODY MASS INDEX: 51.16 KG/M2

## 2022-04-15 PROCEDURE — 99214 OFFICE O/P EST MOD 30 MIN: CPT

## 2022-04-15 NOTE — CONSULT LETTER
[Dear  ___] : Dear  [unfilled], [Sincerely,] : Sincerely, [FreeTextEntry1] : I had the pleasure of seeing your patient, ANUSHA ALBERT, in my office today. Please see my note below. \par \par If you have any questions, please do not hesitate to contact me.  [FreeTextEntry3] : Prudence Brewer MD\par Director, Epilepsy/EMU\par [facility] \par [facility] [place]\par [address] [place] [address] \par Tel: [phone]; Fax: [phone]

## 2022-04-17 NOTE — HISTORY OF PRESENT ILLNESS
[FreeTextEntry1] : PCP: Dr. Reece Bo\par \par LAST OFFICE VISIT: 2/14/22\par \par INTERIM HISTORY:\par Doing well. No sz. 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 Levy) who presents for posthospital followup for seizure.\par \par Pt presented to [facility] on 9/8 with new onset BTC seizures x2 within 24hrs in setting of severe DKA (BS >500s). She was initially started on LEV, but quickly tapered off. Continue video EEG off LEV was normal. MRI brain revealed an enhancing lesion in the left temporoparietal lobe that was concerning for malignancy. CT C/A/P negative. Lesion was resected on 9/21 by Dr. Levy, with pathology positive for reactive gliosis. Pt was discharged on LEV 500mg BID for sz ppx. MRI C- and T-spine unremarkable. No further sz. Reports blurry vision in the right visual field that requires multiple eye blinking to regain visual clarity. CURRENT ASM: LEV 500mg BID – started 9/2021.\par \par === 2/14/22 ===\par Completed EMU 11/17 – 11/28/21. Although no IED or sz seen, patient decided to stay on LEV for sz ppx. Doing well. No sz. CURRENT ASM: LEV 500mg BID - started 9/2021.\par \par \par SEIZURE RISK FACTORS:\par Hyperglycemia. Left temporoparietal lesion s/p resection 9/2021. Patient was a product of normal pregnancy and delivery. No history of febrile seizure, TBI, CNS infection, or FH of seizures. \par \par PREVIOUS ASM:\par None\par \par IMAGING: \par Multiple MRIs, selected few below.\par \par MRI brain w/wo 10/30/21 ([place]): Normal temporal evolution of previously seen hemorrhage in the left occipital surgical bed. Decreased edema surrounding the surgical cavity. Somewhat thickened peripheral enhancement of the surgical bed. Nodular enhancement along the mesial aspect of the surgical cavity measures 0.8 x 0.8 x 0.9 cm (maximal anterior-posterior by transverse by craniocaudad dimensions). Nodular enhancement along the mesial aspect of the surgical cavity measures 2.2 x 0.8 x 0.9 cm. Nonspecific 1.2 x 0.9 x 0.9 cm focus of enhancement superior to the surgical cavity. While findings may represent postsurgical changes, presence of neoplasm is not excluded. \par \par MRI C- and T-spines w/wo 10/30/21 ([place]): unremarkable \par \par MRI brain w/ contrast 9/15/21 ([facility]): Corresponding to the FLAIR hyperintensity and diffuse diffusion restriction signal in the left temporoparietal region, there is a heterogeneous and peripherally enhancing lesion measuring 2.5 x 1.5 x 2.9 cm in AP, TRV and craniocaudal dimensions. Superoposterior to the lesion and best seen on series 5 image 16 series 3 image 18 there are small ring-enhancing lesions, largest of which measures 4 mm. There is extension of the lesion close to the posterior horn of the left lateral ventricle without extension into the ependyma. No additional lesions are identified in the brain parenchyma. IMPRESSION: There is a peripherally enhancing mass with satellite smaller lesions as described, most likely represents primary versus metastatic disease however, the former is more likely secondary to absence of large vasogenic edema.\par \par MRI brain w/o, epilepsy protocol 9/13/21 ([facility]): Abnormal signal in the left parietotemporal lobe most likely represents an acute infarction. There is no hemorrhagic conversion. There is no midline shift or herniation. Additional differential diagnostic consideration is post seizure focus. Further correlation with MRI of the brain with contrast is suggested to exclude underlying lesion.\par \par NEUROPHYSIOLOGY:\par EMU 11/17 – 11/28/21 ([facility]): 1) near-cont L PQ slowing; 2) breach L PQ\par None-elective EMU 9/13 – 9/14/21 ([facility]): normal; off AED\par cvEEG 9/8 – 9/10/21 ([facility]): improvement from severe to mild-mod gen slowing with propofol taper\par \par NEUROPSYCHOLOGY: \par none

## 2022-04-17 NOTE — ASSESSMENT
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 Levy, pathology - reactive gliosis) with residual right homonymous hemianopsia who presents for follow-up for 2 BTC sz's in setting of severe DKA with BS > 500. Suspect 2 BTC sz's in September was provoked by hyperglycemia, but unable to exclude possibility of structural etiology (secondary to left temporoparietal lesion). But pt is now certainly at risk for developing focal epilepsy post-resection. Discussed with pt risks and benefits of staying on LEV vs taper off, and pt prefers to stay on LEV.\par \par - continue LEV 500mg BID \par - okay to drive local and short-distance\par - f/u in 4 months\par \par \par Personally reviewed all images, labs and other studies. More than 50% of time spent on counseling and educating patient on differential, workup, disease course, and treatment/management. All questions and concerns answered and addressed in detail to patient's complete satisfaction. Patient verbalized understanding and agreed to plan.\par

## 2022-07-13 ENCOUNTER — APPOINTMENT (OUTPATIENT)
Dept: NEUROLOGY | Facility: CLINIC | Age: 57
End: 2022-07-13

## 2022-10-21 NOTE — DISCHARGE NOTE PROVIDER - NSDCADMDATE_GEN_ALL_CORE_FT
Patient: Abdiel Terrell    Procedure Summary     Date: 10/21/22 Room / Location: [facility] OR 05 / [facility] MAIN OR    Anesthesia Start: 1007 Anesthesia Stop: 1129    Procedure: MINIMALLY INVASIVE LUMBAR LAMINECTOMY AND DISCECTOMY, LEFT APPROACH LUMBAR 4-LUMBAR 5 (Left: Back) Diagnosis:       Spinal stenosis, lumbar region, without neurogenic claudication      (Spinal stenosis, lumbar region, without neurogenic claudication [M48.061])    Surgeons: Rex Reis MD Provider: Wisam Macias MD    Anesthesia Type: general ASA Status: 2          Anesthesia Type: general    Vitals  Vitals Value Taken Time   /83 10/21/22 1205   Temp 36.1 °C (97 °F) 10/21/22 1200   Pulse 94 10/21/22 1207   Resp 14 10/21/22 1205   SpO2 98 % 10/21/22 1207   Vitals shown include unvalidated device data.        Post Anesthesia Care and Evaluation    Patient location during evaluation: bedside  Patient participation: complete - patient participated  Level of consciousness: awake  Pain management: adequate    Airway patency: patent  Anesthetic complications: No anesthetic complications  PONV Status: none  Cardiovascular status: acceptable and stable  Respiratory status: acceptable  Hydration status: acceptable    Comments: An Anesthesiologist personally participated in the most demanding procedures (including induction and emergence if applicable) in the anesthesia plan, monitored the course of anesthesia administration at frequent intervals and remained physically present and available for immediate diagnosis and treatment of emergencies.             17-Nov-2021 13:42

## 2023-03-13 ENCOUNTER — APPOINTMENT (OUTPATIENT)
Dept: NEUROLOGY | Facility: CLINIC | Age: 58
End: 2023-03-13

## 2023-06-19 ENCOUNTER — APPOINTMENT (OUTPATIENT)
Dept: NEUROLOGY | Facility: CLINIC | Age: 58
End: 2023-06-19
Payer: MEDICAID

## 2023-06-19 VITALS
DIASTOLIC BLOOD PRESSURE: 78 MMHG | HEIGHT: 61 IN | WEIGHT: 269 LBS | BODY MASS INDEX: 50.79 KG/M2 | SYSTOLIC BLOOD PRESSURE: 124 MMHG

## 2023-06-19 DIAGNOSIS — D49.6 NEOPLASM OF UNSPECIFIED BEHAVIOR OF BRAIN: ICD-10-CM

## 2023-06-19 DIAGNOSIS — H53.8 OTHER VISUAL DISTURBANCES: ICD-10-CM

## 2023-06-19 DIAGNOSIS — Z01.818 ENCOUNTER FOR OTHER PREPROCEDURAL EXAMINATION: ICD-10-CM

## 2023-06-19 DIAGNOSIS — H53.461 HOMONYMOUS BILATERAL FIELD DEFECTS, RIGHT SIDE: ICD-10-CM

## 2023-06-19 PROCEDURE — 99214 OFFICE O/P EST MOD 30 MIN: CPT

## 2023-06-19 RX ORDER — LEVETIRACETAM 500 MG/1
500 TABLET, FILM COATED ORAL
Qty: 60 | Refills: 3 | Status: DISCONTINUED | COMMUNITY
Start: 2021-10-12 | End: 2023-06-19

## 2023-06-19 RX ORDER — LEVETIRACETAM 1000 MG/1
TABLET, FILM COATED ORAL
Refills: 0 | Status: DISCONTINUED | COMMUNITY
End: 2023-06-19

## 2023-06-19 NOTE — ASSESSMENT
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 Levy, pathology - reactive gliosis) with residual right homonymous hemianopsia who presents for follow-up for 2 BTC sz's in setting of severe DKA with BS > 500. Suspect 2 BTC sz's in September was provoked by hyperglycemia, but unable to exclude possibility of structural etiology (secondary to left temporoparietal lesion). Discussed with pt risks and benefits of staying on LEV vs taper off, and pt prefers to stay on LEV.\par \par - Change LEV ER 500mg BID \par - needs VF testing with ophthalmology - if hemianopsia confirmed, may refer decision on driving to [facility].  \par - f/u in 4 months\par \par I have spent 30 minutes or longer reviewing patient data or discussing with the patient  the cause of seizures or seizure-like events and comorbid conditions, assessing the risk of recurrence, educating the patient or family to recognize seizures, discussing possible treatment options for seizures and comorbid conditions and documenting encounter and plan. More than 50% of time spent counseling and educating patient about epilepsy including safety issues, AED side effects and interactions, alcohol consumption, sleep deprivation, risks and driving privileges associated with the [facility]. Greater than 50% of the encounter time was spent on counseling and coordination of care for reviewing records in [employer], discussion with patient regarding plan.

## 2023-06-19 NOTE — PHYSICAL EXAM
[FreeTextEntry1] : Alert and oriented x 3, speech fluent, names easily, follows requests, good recall for recent and remote events.\par Visual field testing shows a right homonymous hemianopsia to finger counting.  EOM full without sustained nystagmus, PERRL, face symmetrical, no dysarthria\par Motor - symmetric strength. normal rapid-alternating movements.\par Sensory - intact LT bilaterally\par Coord - no tremor, ataxia\par Gait - stands without difficulty, normal gait.

## 2023-06-19 NOTE — HISTORY OF PRESENT ILLNESS
[FreeTextEntry1] : *** 06/19/2023  ***\par  Ms. ALBERT was last seen approximately a year ago.  Since then, she reports no convulsive seizures.  She continues taking levetiracetam 500 mg twice a day.  She reports nearly daily events of right leg jerking lasting 3 to 5 minutes without sensory involvement.  She has no visual involvement either.  She notes that since her surgery, she has poor vision on the right side.  She is driving short distances, and not driving at night.  She endorses that she sometimes has been surprised by objects on her right.\par \par *** 4/15/2022 ***\par Doing well. No sz. 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 Levy) who presents for posthospital followup for seizure.\par \par Pt presented to [facility] on 9/8 with new onset BTC seizures x2 within 24hrs in setting of severe DKA (BS >500s). She was initially started on LEV, but quickly tapered off. Continue video EEG off LEV was normal. MRI brain revealed an enhancing lesion in the left temporoparietal lobe that was concerning for malignancy. CT C/A/P negative. Lesion was resected on 9/21 by Dr. Levy, with pathology positive for reactive gliosis. Pt was discharged on LEV 500mg BID for sz ppx. MRI C- and T-spine unremarkable. No further sz. Reports blurry vision in the right visual field that requires multiple eye blinking to regain visual clarity. CURRENT ASM: LEV 500mg BID – started 9/2021.\par \par === 2/14/22 ===\par Completed EMU 11/17 – 11/28/21. Although no IED or sz seen, patient decided to stay on LEV for sz ppx. Doing well. No sz. CURRENT ASM: LEV 500mg BID - started 9/2021.\par \par \par SEIZURE RISK FACTORS:\par Hyperglycemia. Left temporoparietal lesion s/p resection 9/2021. Patient was a product of normal pregnancy and delivery. No history of febrile seizure, TBI, CNS infection, or FH of seizures. \par \par PREVIOUS ASM:\par None\par \par IMAGING: \par Multiple MRIs, selected few below.\par \par MRI brain w/wo 10/30/21 ([place]): Normal temporal evolution of previously seen hemorrhage in the left occipital surgical bed. Decreased edema surrounding the surgical cavity. Somewhat thickened peripheral enhancement of the surgical bed. Nodular enhancement along the mesial aspect of the surgical cavity measures 0.8 x 0.8 x 0.9 cm (maximal anterior-posterior by transverse by craniocaudad dimensions). Nodular enhancement along the mesial aspect of the surgical cavity measures 2.2 x 0.8 x 0.9 cm. Nonspecific 1.2 x 0.9 x 0.9 cm focus of enhancement superior to the surgical cavity. While findings may represent postsurgical changes, presence of neoplasm is not excluded. \par \par MRI C- and T-spines w/wo 10/30/21 ([place]): unremarkable \par \par MRI brain w/ contrast 9/15/21 ([facility]): Corresponding to the FLAIR hyperintensity and diffuse diffusion restriction signal in the left temporoparietal region, there is a heterogeneous and peripherally enhancing lesion measuring 2.5 x 1.5 x 2.9 cm in AP, TRV and craniocaudal dimensions. Superoposterior to the lesion and best seen on series 5 image 16 series 3 image 18 there are small ring-enhancing lesions, largest of which measures 4 mm. There is extension of the lesion close to the posterior horn of the left lateral ventricle without extension into the ependyma. No additional lesions are identified in the brain parenchyma. IMPRESSION: There is a peripherally enhancing mass with satellite smaller lesions as described, most likely represents primary versus metastatic disease however, the former is more likely secondary to absence of large vasogenic edema.\par \par MRI brain w/o, epilepsy protocol 9/13/21 ([facility]): Abnormal signal in the left parietotemporal lobe most likely represents an acute infarction. There is no hemorrhagic conversion. There is no midline shift or herniation. Additional differential diagnostic consideration is post seizure focus. Further correlation with MRI of the brain with contrast is suggested to exclude underlying lesion.\par \par NEUROPHYSIOLOGY:\par EMU 11/17 – 11/28/21 ([facility]): 1) near-cont L PQ slowing; 2) breach L PQ\par None-elective EMU 9/13 – 9/14/21 ([facility]): normal; off AED\par cvEEG 9/8 – 9/10/21 ([facility]): improvement from severe to mild-mod gen slowing with propofol taper\par \par NEUROPSYCHOLOGY: \par none

## 2023-07-25 NOTE — PATIENT PROFILE ADULT - HARM RISK FACTORS
Vanessa, this is Candace Hope, clinical pharmacist with [facility] that is part of your care team.  We have begun working on your prescription that your doctor has sent us. Our next steps include:     Working with your insurance company to obtain approval for your medication  Working with you to ensure your medication is affordable     We will be calling you along the way with updates on your medication but if you have any concerns or receive information that you would like to discuss please reach us at [phone].    Welcome call outcome: Patient/caregiver reached   no

## 2023-12-18 ENCOUNTER — RX RENEWAL (OUTPATIENT)
Age: 58
End: 2023-12-18

## 2024-01-03 ENCOUNTER — APPOINTMENT (OUTPATIENT)
Dept: NEUROLOGY | Facility: CLINIC | Age: 59
End: 2024-01-03
Payer: MEDICAID

## 2024-01-03 VITALS
WEIGHT: 282 LBS | DIASTOLIC BLOOD PRESSURE: 79 MMHG | BODY MASS INDEX: 53.24 KG/M2 | HEIGHT: 61 IN | SYSTOLIC BLOOD PRESSURE: 120 MMHG

## 2024-01-03 DIAGNOSIS — Z98.890 OTHER SPECIFIED POSTPROCEDURAL STATES: ICD-10-CM

## 2024-01-03 DIAGNOSIS — G93.9 DISORDER OF BRAIN, UNSPECIFIED: ICD-10-CM

## 2024-01-03 PROCEDURE — 99214 OFFICE O/P EST MOD 30 MIN: CPT

## 2024-01-03 RX ORDER — LEVETIRACETAM 750 MG/1
750 TABLET, EXTENDED RELEASE ORAL
Qty: 180 | Refills: 1 | Status: ACTIVE | COMMUNITY
Start: 2023-06-19 | End: 1900-01-01

## 2024-01-03 NOTE — PHYSICAL EXAM
[FreeTextEntry1] : Alert and oriented x 3, speech fluent, names easily, follows requests, good recall for recent and remote events. Visual field testing shows a right homonymous hemianopsia to finger counting.  EOM full without sustained nystagmus, PERRL, face symmetrical, no dysarthria Motor - symmetric strength. normal rapid-alternating movements. Sensory - intact LT bilaterally Coord - no tremor, ataxia Gait - stands without difficulty, normal gait.

## 2024-01-03 NOTE — ASSESSMENT
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 Levy, pathology - reactive gliosis) with residual right homonymous hemianopsia who presents for follow-up for 2 BTC sz's in setting of severe DKA with BS > 500. Suspect 2 BTC sz's in September was provoked by hyperglycemia, but unable to exclude possibility of structural etiology (secondary to left temporoparietal lesion). Discussed with pt risks and benefits of staying on LEV vs taper off, and pt prefers to stay on LEV.  - increase LEV ER 750mg BID - needs VF testing with ophthalmology - if hemianopsia confirmed, may refer to eval by [employer] - counselled on [facility] law that requires 12 mo seizure freedom to drive and that she is not currently seizure free and so should not drive.  - f/u in 2 months - repeat MRI head - assess interval change of lesion.   I have spent 30 minutes or longer reviewing patient data or discussing with the patient the cause of seizures or seizure-like events and comorbid conditions, assessing the risk of recurrence, educating the patient or family to recognize seizures, discussing possible treatment options for seizures and comorbid conditions and documenting encounter and plan. More than 50% of time spent counseling and educating patient about epilepsy including safety issues, AED side effects and interactions, alcohol consumption, sleep deprivation, risks and driving privileges associated with the [facility]. Greater than 50% of the encounter time was spent on counseling and coordination of care for reviewing records in [employer], discussion with patient regarding plan.

## 2024-01-03 NOTE — HISTORY OF PRESENT ILLNESS
[FreeTextEntry1] : *** 01/03/2024  *** Ms. ALBERT reports that she gets a brief seizure with "tremor" approximately monthly, duration approximately 4 minutes.  The timing of seizure is random.  She has been driving in daytime - she usually drives with another person in car. Her daughters report that she sometimes moves over excessively to right.  She would like interval MRI, c/o headaches.   *** 06/19/2023  ***  Ms. ALBERT was last seen approximately a year ago.  Since then, she reports no convulsive seizures.  She continues taking levetiracetam 500 mg twice a day.  She reports nearly daily events of right leg jerking lasting 3 to 5 minutes without sensory involvement.  She has no visual involvement either.  She notes that since her surgery, she has poor vision on the right side.  She is driving short distances, and not driving at night.  She endorses that she sometimes has been surprised by objects on her right.  *** 4/15/2022 *** Doing well. No sz. Interested in resuming driving.  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 Levy) who presents for posthospital followup for seizure.  Pt presented to [facility] on 9/8 with new onset BTC seizures x2 within 24hrs in setting of severe DKA (BS >500s). She was initially started on LEV, but quickly tapered off. Continue video EEG off LEV was normal. MRI brain revealed an enhancing lesion in the left temporoparietal lobe that was concerning for malignancy. CT C/A/P negative. Lesion was resected on 9/21 by Dr. Levy, with pathology positive for reactive gliosis. Pt was discharged on LEV 500mg BID for sz ppx. MRI C- and T-spine unremarkable. No further sz. Reports blurry vision in the right visual field that requires multiple eye blinking to regain visual clarity. CURRENT ASM: LEV 500mg BID - started 9/2021.  === 2/14/22 === Completed EMU 11/17 - 11/28/21. Although no IED or sz seen, patient decided to stay on LEV for sz ppx. Doing well. No sz. CURRENT ASM: LEV 500mg BID - started 9/2021.   SEIZURE RISK FACTORS: Hyperglycemia. Left temporoparietal lesion s/p resection 9/2021. Patient was a product of normal pregnancy and delivery. No history of febrile seizure, TBI, CNS infection, or FH of seizures.   PREVIOUS ASM: None  IMAGING:  Multiple MRIs, selected few below.  MRI brain w/wo 10/30/21 ([place]): Normal temporal evolution of previously seen hemorrhage in the left occipital surgical bed. Decreased edema surrounding the surgical cavity. Somewhat thickened peripheral enhancement of the surgical bed. Nodular enhancement along the mesial aspect of the surgical cavity measures 0.8 x 0.8 x 0.9 cm (maximal anterior-posterior by transverse by craniocaudad dimensions). Nodular enhancement along the mesial aspect of the surgical cavity measures 2.2 x 0.8 x 0.9 cm. Nonspecific 1.2 x 0.9 x 0.9 cm focus of enhancement superior to the surgical cavity. While findings may represent postsurgical changes, presence of neoplasm is not excluded.   MRI C- and T-spines w/wo 10/30/21 ([place]): unremarkable   MRI brain w/ contrast 9/15/21 ([facility]): Corresponding to the FLAIR hyperintensity and diffuse diffusion restriction signal in the left temporoparietal region, there is a heterogeneous and peripherally enhancing lesion measuring 2.5 x 1.5 x 2.9 cm in AP, TRV and craniocaudal dimensions. Superoposterior to the lesion and best seen on series 5 image 16 series 3 image 18 there are small ring-enhancing lesions, largest of which measures 4 mm. There is extension of the lesion close to the posterior horn of the left lateral ventricle without extension into the ependyma. No additional lesions are identified in the brain parenchyma. IMPRESSION: There is a peripherally enhancing mass with satellite smaller lesions as described, most likely represents primary versus metastatic disease however, the former is more likely secondary to absence of large vasogenic edema.  MRI brain w/o, epilepsy protocol 9/13/21 ([facility]): Abnormal signal in the left parietotemporal lobe most likely represents an acute infarction. There is no hemorrhagic conversion. There is no midline shift or herniation. Additional differential diagnostic consideration is post seizure focus. Further correlation with MRI of the brain with contrast is suggested to exclude underlying lesion.  NEUROPHYSIOLOGY: EMU 11/17 - 11/28/21 ([facility]): 1) near-cont L PQ slowing; 2) breach L PQ None-elective EMU 9/13 - 9/14/21 ([facility]): normal; off AED cvEEG 9/8 - 9/10/21 ([facility]): improvement from severe to mild-mod gen slowing with propofol taper  NEUROPSYCHOLOGY:  none

## 2024-01-10 LAB
PO2 BLDV: <42 MMHG — LOW (ref 25–45)
PO2 BLDV: <42 MMHG — LOW (ref 25–45)
SAO2 % BLDV: SIGNIFICANT CHANGE UP %
SAO2 % BLDV: SIGNIFICANT CHANGE UP %

## 2024-01-19 NOTE — DISCHARGE NOTE PROVIDER - NSDCHHATTENDCERT_GEN_ALL_CORE
See TCM 1/19/24   My signature below certifies that the above stated patient is homebound and upon completion of the Face-To-Face encounter, has the need for intermittent skilled nursing, physical therapy and/or speech or occupational therapy services in their home for their current diagnosis as outlined in their initial plan of care. These services will continue to be monitored by myself or another physician.

## 2024-01-20 ENCOUNTER — APPOINTMENT (OUTPATIENT)
Dept: MRI IMAGING | Facility: CLINIC | Age: 59
End: 2024-01-20

## 2024-01-20 ENCOUNTER — OUTPATIENT (OUTPATIENT)
Dept: OUTPATIENT SERVICES | Facility: HOSPITAL | Age: 59
LOS: 1 days | End: 2024-01-20
Payer: MEDICAID

## 2024-01-20 ENCOUNTER — RESULT REVIEW (OUTPATIENT)
Age: 59
End: 2024-01-20

## 2024-01-20 DIAGNOSIS — R56.9 UNSPECIFIED CONVULSIONS: ICD-10-CM

## 2024-01-20 DIAGNOSIS — G93.9 DISORDER OF BRAIN, UNSPECIFIED: Chronic | ICD-10-CM

## 2024-01-20 DIAGNOSIS — Z98.890 OTHER SPECIFIED POSTPROCEDURAL STATES: Chronic | ICD-10-CM

## 2024-01-20 PROCEDURE — 76377 3D RENDER W/INTRP POSTPROCES: CPT | Mod: 26

## 2024-01-20 PROCEDURE — 70553 MRI BRAIN STEM W/O & W/DYE: CPT | Mod: 26

## 2024-02-07 ENCOUNTER — EMERGENCY (EMERGENCY)
Facility: HOSPITAL | Age: 59
LOS: 1 days | Discharge: HOME | End: 2024-02-07
Attending: STUDENT IN AN ORGANIZED HEALTH CARE EDUCATION/TRAINING PROGRAM
Payer: COMMERCIAL

## 2024-02-07 VITALS
SYSTOLIC BLOOD PRESSURE: 126 MMHG | RESPIRATION RATE: 18 BRPM | WEIGHT: 278.88 LBS | HEART RATE: 78 BPM | OXYGEN SATURATION: 98 % | TEMPERATURE: 98 F | HEIGHT: 61 IN | DIASTOLIC BLOOD PRESSURE: 93 MMHG

## 2024-02-07 VITALS
SYSTOLIC BLOOD PRESSURE: 109 MMHG | HEART RATE: 63 BPM | DIASTOLIC BLOOD PRESSURE: 75 MMHG | RESPIRATION RATE: 18 BRPM | OXYGEN SATURATION: 99 % | TEMPERATURE: 98 F

## 2024-02-07 DIAGNOSIS — G93.9 DISORDER OF BRAIN, UNSPECIFIED: Chronic | ICD-10-CM

## 2024-02-07 DIAGNOSIS — Z98.890 OTHER SPECIFIED POSTPROCEDURAL STATES: Chronic | ICD-10-CM

## 2024-02-07 LAB
BASE EXCESS BLDV CALC-SCNC: 1.9 MMOL/L — SIGNIFICANT CHANGE UP (ref -2–3)
BASOPHILS # BLD AUTO: 0.03 K/UL — SIGNIFICANT CHANGE UP (ref 0–0.2)
BASOPHILS NFR BLD AUTO: 0.4 % — SIGNIFICANT CHANGE UP (ref 0–2)
CA-I SERPL-SCNC: 1.21 MMOL/L — SIGNIFICANT CHANGE UP (ref 1.15–1.33)
CHLORIDE BLDV-SCNC: 97 MMOL/L — SIGNIFICANT CHANGE UP (ref 96–108)
EOSINOPHIL # BLD AUTO: 0.2 K/UL — SIGNIFICANT CHANGE UP (ref 0–0.5)
EOSINOPHIL NFR BLD AUTO: 2.6 % — SIGNIFICANT CHANGE UP (ref 0–6)
GAS PNL BLDV: 133 MMOL/L — LOW (ref 136–145)
GAS PNL BLDV: SIGNIFICANT CHANGE UP
GLUCOSE BLDV-MCNC: 305 MG/DL — HIGH (ref 70–99)
HCO3 BLDV-SCNC: 28 MMOL/L — SIGNIFICANT CHANGE UP (ref 22–29)
HCT VFR BLD CALC: 43.6 % — SIGNIFICANT CHANGE UP (ref 34.5–45)
HCT VFR BLDA CALC: 45 % — SIGNIFICANT CHANGE UP
HGB BLD CALC-MCNC: 15 G/DL — SIGNIFICANT CHANGE UP (ref 11.7–16.1)
HGB BLD-MCNC: 14.9 G/DL — SIGNIFICANT CHANGE UP (ref 11.5–15.5)
IMM GRANULOCYTES NFR BLD AUTO: 0.1 % — SIGNIFICANT CHANGE UP (ref 0–0.9)
LACTATE BLDV-MCNC: 1.9 MMOL/L — SIGNIFICANT CHANGE UP (ref 0.5–2)
LYMPHOCYTES # BLD AUTO: 3.76 K/UL — HIGH (ref 1–3.3)
LYMPHOCYTES # BLD AUTO: 49.7 % — HIGH (ref 13–44)
MCHC RBC-ENTMCNC: 30.7 PG — SIGNIFICANT CHANGE UP (ref 27–34)
MCHC RBC-ENTMCNC: 34.2 GM/DL — SIGNIFICANT CHANGE UP (ref 32–36)
MCV RBC AUTO: 89.7 FL — SIGNIFICANT CHANGE UP (ref 80–100)
MONOCYTES # BLD AUTO: 0.42 K/UL — SIGNIFICANT CHANGE UP (ref 0–0.9)
MONOCYTES NFR BLD AUTO: 5.6 % — SIGNIFICANT CHANGE UP (ref 2–14)
NEUTROPHILS # BLD AUTO: 3.14 K/UL — SIGNIFICANT CHANGE UP (ref 1.8–7.4)
NEUTROPHILS NFR BLD AUTO: 41.6 % — LOW (ref 43–77)
PCO2 BLDV: 56 MMHG — HIGH (ref 39–42)
PH BLDV: 7.31 — LOW (ref 7.32–7.43)
PLATELET # BLD AUTO: 260 K/UL — SIGNIFICANT CHANGE UP (ref 150–400)
PO2 BLDV: 50 MMHG — HIGH (ref 25–45)
POTASSIUM BLDV-SCNC: 4.2 MMOL/L — SIGNIFICANT CHANGE UP (ref 3.5–5.1)
RBC # BLD: 4.86 M/UL — SIGNIFICANT CHANGE UP (ref 3.8–5.2)
RBC # FLD: 13 % — SIGNIFICANT CHANGE UP (ref 10.3–14.5)
SAO2 % BLDV: 79.6 % — SIGNIFICANT CHANGE UP
WBC # BLD: 7.56 K/UL — SIGNIFICANT CHANGE UP (ref 3.8–10.5)
WBC # FLD AUTO: 7.56 K/UL — SIGNIFICANT CHANGE UP (ref 3.8–10.5)

## 2024-02-07 PROCEDURE — 99284 EMERGENCY DEPT VISIT MOD MDM: CPT

## 2024-02-07 RX ORDER — SODIUM CHLORIDE 9 MG/ML
1000 INJECTION INTRAMUSCULAR; INTRAVENOUS; SUBCUTANEOUS ONCE
Refills: 0 | Status: COMPLETED | OUTPATIENT
Start: 2024-02-07 | End: 2024-02-07

## 2024-02-07 RX ADMIN — SODIUM CHLORIDE 1000 MILLILITER(S): 9 INJECTION INTRAMUSCULAR; INTRAVENOUS; SUBCUTANEOUS at 23:08

## 2024-02-07 NOTE — ED PROVIDER NOTE - ATTENDING CONTRIBUTION TO CARE
58y F w/ hx DM (on metformin, tresiba), seizures; presents for hyperglycemia. Pt had routine blood work done yesterday, and was called today to go to the ED because blood sugar on labs was 442. Pt admits to sometimes being noncompliant with her tresiba. Has not been checking her blood sugar at home because her glucometer broke. Denies recent illness. On exam, pt in no distress, +dry oral mucosa. Lungs CTAB, abdomen soft and nontender. Fingerstick on arrival 355. Will check labs, treat with fluids. Pt advised of need for close monitoring of blood sugar at home and importance of adherence to medications. Plan for discharge if no other acute findings on lab work.

## 2024-02-07 NOTE — ED PROVIDER NOTE - PATIENT PORTAL LINK FT
You can access the FollowMyHealth Patient Portal offered by [facility] by registering at the following website: [email or web address]. By joining [employer]’s FollowMyHealth portal, you will also be able to view your health information using other applications (apps) compatible with our system.

## 2024-02-07 NOTE — ED ADULT TRIAGE NOTE - BEFAST SCREENING
Physical Therapy    Pt received supine with reporting not sleeping well last night and not wanting to work with therapy at this time. PT will defer at this time and will follow up as able.      Professionally,     Flavio Loving, PTA Negative

## 2024-02-07 NOTE — ED PROVIDER NOTE - CLINICAL SUMMARY MEDICAL DECISION MAKING FREE TEXT BOX
Pt is a 57 yo female with PMH of DM, seizures, s/p L temporoparietal mass resection 2 years ago, presenting with hyperglycemia. Pt was at her PCP office for a regular follow up and was told to come into the ED for further evaluation of hyperglycemia, 442 in office. Pt reports that she has had fatigue, increased urination and thirst for the past few days, but is otherwise asymptomatic. Pt has been compliant on her DM meds. Pt denies, confusion, dizziness, headache, vision changes, chest pain, SOB, fever, chills, cough, abdominal pain, N/V/D, dysuria. physical exam unremarkable. labs, ns bolus 1L, uc/ua.

## 2024-02-07 NOTE — ED PROVIDER NOTE - OBJECTIVE STATEMENT
Pt is a 59 yo female with PMH of DM, seizures, s/p L temporoparietal mass resection 2 years ago, presenting with hyperglycemia. Pt was at her PCP office for a regular follow up and was told to come into the ED for further evaluation of hyperglycemia, 442 in office. Pt reports that she has had fatigue, increased urination and thirst for the past few days, but is otherwise asymptomatic. Pt has been compliant on her DM meds. Pt denies, confusion, dizziness, headache, vision changes, chest pain, SOB, fever, chills, cough, abdominal pain, N/V/D, dysuria. Pt is a 57 yo female with PMH of DM, seizures on Keppra, s/p L temporoparietal mass resection 2 years ago, presenting with hyperglycemia. Pt was at her PCP office for a regular follow up and was told to come into the ED for further evaluation of hyperglycemia, 442 in office. Pt reports that she has had fatigue, increased urination and thirst for the past few days, but is otherwise asymptomatic. Pt has been compliant on her DM meds. Pt denies, confusion, dizziness, headache, vision changes, chest pain, SOB, fever, chills, cough, abdominal pain, N/V/D, dysuria.

## 2024-02-07 NOTE — ED ADULT NURSE REASSESSMENT NOTE - NS ED NURSE REASSESS COMMENT FT1
Assumed care of patient at 23:40 from dayshift RN Maryann, charting as noted, patient is awake, calm, RR even and unlabored, denies sob, denies chest pain, iv patent, labs and urine sent to lab, patient reports feeling better, fs has improved, no complaints at this time.

## 2024-02-07 NOTE — ED PROVIDER NOTE - PROGRESS NOTE DETAILS
Noe: Pt does not wish to wait for result of CMP/UA. Low clinical suspicion for DKA; minimal acidosis on VBG. Blood sugar improved with fluids. Hyperglycemia likely 2/2 medication noncompliance. Will discharge with return precautions. Noe: Pt does not wish to wait for result of CMP. Low clinical suspicion for DKA; minimal acidosis on VBG. Blood sugar improved with fluids. Hyperglycemia likely 2/2 medication noncompliance. Will treat for UTI with Keflex. Stable for discharge with return precautions.

## 2024-02-08 LAB
ACETONE SERPL-MCNC: NEGATIVE — SIGNIFICANT CHANGE UP
ALBUMIN SERPL ELPH-MCNC: 3.5 G/DL — SIGNIFICANT CHANGE UP (ref 3.3–5.2)
ALP SERPL-CCNC: 75 U/L — SIGNIFICANT CHANGE UP (ref 40–120)
ALT FLD-CCNC: 9 U/L — SIGNIFICANT CHANGE UP
AMORPH CRY # UR COMP ASSIST: PRESENT
ANION GAP SERPL CALC-SCNC: 10 MMOL/L — SIGNIFICANT CHANGE UP (ref 5–17)
APPEARANCE UR: ABNORMAL
AST SERPL-CCNC: 21 U/L — SIGNIFICANT CHANGE UP
BACTERIA # UR AUTO: ABNORMAL /HPF
BILIRUB SERPL-MCNC: 0.3 MG/DL — LOW (ref 0.4–2)
BILIRUB UR-MCNC: NEGATIVE — SIGNIFICANT CHANGE UP
BUN SERPL-MCNC: 7.5 MG/DL — LOW (ref 8–20)
CALCIUM SERPL-MCNC: 8.6 MG/DL — SIGNIFICANT CHANGE UP (ref 8.4–10.5)
CHLORIDE SERPL-SCNC: 100 MMOL/L — SIGNIFICANT CHANGE UP (ref 96–108)
CO2 SERPL-SCNC: 26 MMOL/L — SIGNIFICANT CHANGE UP (ref 22–29)
COLOR SPEC: YELLOW — SIGNIFICANT CHANGE UP
CREAT SERPL-MCNC: 0.78 MG/DL — SIGNIFICANT CHANGE UP (ref 0.5–1.3)
DIFF PNL FLD: ABNORMAL
EGFR: 87 ML/MIN/1.73M2 — SIGNIFICANT CHANGE UP
GLUCOSE SERPL-MCNC: 251 MG/DL — HIGH (ref 70–99)
GLUCOSE UR QL: >=1000 MG/DL
KETONES UR-MCNC: ABNORMAL MG/DL
LEUKOCYTE ESTERASE UR-ACNC: ABNORMAL
NITRITE UR-MCNC: POSITIVE
PH UR: 5.5 — SIGNIFICANT CHANGE UP (ref 5–8)
POTASSIUM SERPL-MCNC: 4.4 MMOL/L — SIGNIFICANT CHANGE UP (ref 3.5–5.3)
POTASSIUM SERPL-SCNC: 4.4 MMOL/L — SIGNIFICANT CHANGE UP (ref 3.5–5.3)
PROT SERPL-MCNC: 6.1 G/DL — LOW (ref 6.6–8.7)
PROT UR-MCNC: 30 MG/DL
RBC CASTS # UR COMP ASSIST: 1 /HPF — SIGNIFICANT CHANGE UP (ref 0–4)
SODIUM SERPL-SCNC: 136 MMOL/L — SIGNIFICANT CHANGE UP (ref 135–145)
SP GR SPEC: >1.03 — HIGH (ref 1–1.03)
SQUAMOUS # UR AUTO: 26 /HPF — HIGH (ref 0–5)
UROBILINOGEN FLD QL: 1 MG/DL — SIGNIFICANT CHANGE UP (ref 0.2–1)
WBC UR QL: 145 /HPF — HIGH (ref 0–5)

## 2024-02-08 PROCEDURE — 36415 COLL VENOUS BLD VENIPUNCTURE: CPT

## 2024-02-08 PROCEDURE — 84100 ASSAY OF PHOSPHORUS: CPT

## 2024-02-08 PROCEDURE — 85014 HEMATOCRIT: CPT

## 2024-02-08 PROCEDURE — 82435 ASSAY OF BLOOD CHLORIDE: CPT

## 2024-02-08 PROCEDURE — 84132 ASSAY OF SERUM POTASSIUM: CPT

## 2024-02-08 PROCEDURE — 85025 COMPLETE CBC W/AUTO DIFF WBC: CPT

## 2024-02-08 PROCEDURE — 99283 EMERGENCY DEPT VISIT LOW MDM: CPT

## 2024-02-08 PROCEDURE — 81001 URINALYSIS AUTO W/SCOPE: CPT

## 2024-02-08 PROCEDURE — 82803 BLOOD GASES ANY COMBINATION: CPT

## 2024-02-08 PROCEDURE — 83735 ASSAY OF MAGNESIUM: CPT

## 2024-02-08 PROCEDURE — 87086 URINE CULTURE/COLONY COUNT: CPT

## 2024-02-08 PROCEDURE — 87186 SC STD MICRODIL/AGAR DIL: CPT

## 2024-02-08 PROCEDURE — 82962 GLUCOSE BLOOD TEST: CPT

## 2024-02-08 PROCEDURE — 80053 COMPREHEN METABOLIC PANEL: CPT

## 2024-02-08 PROCEDURE — 82947 ASSAY GLUCOSE BLOOD QUANT: CPT

## 2024-02-08 PROCEDURE — 83605 ASSAY OF LACTIC ACID: CPT

## 2024-02-08 PROCEDURE — 84295 ASSAY OF SERUM SODIUM: CPT

## 2024-02-08 PROCEDURE — 82330 ASSAY OF CALCIUM: CPT

## 2024-02-08 PROCEDURE — 82009 KETONE BODYS QUAL: CPT

## 2024-02-08 PROCEDURE — 85018 HEMOGLOBIN: CPT

## 2024-02-08 RX ORDER — CEPHALEXIN 500 MG
500 CAPSULE ORAL ONCE
Refills: 0 | Status: COMPLETED | OUTPATIENT
Start: 2024-02-08 | End: 2024-02-08

## 2024-02-08 RX ORDER — CEPHALEXIN 500 MG
1 CAPSULE ORAL
Qty: 15 | Refills: 0
Start: 2024-02-08 | End: 2024-02-12

## 2024-02-08 RX ADMIN — Medication 500 MILLIGRAM(S): at 00:59

## 2024-02-08 NOTE — ED ADULT NURSE NOTE - NSFALLUNIVINTERV_ED_ALL_ED
Bed/Stretcher in lowest position, wheels locked, appropriate side rails in place/Call bell, personal items and telephone in reach/Instruct patient to call for assistance before getting out of bed/chair/stretcher/Non-slip footwear applied when patient is off stretcher/[place] to call system/Physically safe environment - no spills, clutter or unnecessary equipment/Purposeful proactive rounding/Room/bathroom lighting operational, light cord in reach

## 2024-02-08 NOTE — ED ADULT NURSE NOTE - OBJECTIVE STATEMENT
Pt A&Ox4 arrives c/o high blood sugar. States she got blood work yesterday and was called to go the hospital. PMHx of DM type 2. States has increased urination. Denies n/v/d, dizziness, CP, SOB. Airway patent. Respirations even and unlabored. Bed locked in lowest position with siderails raised.

## 2024-04-03 ENCOUNTER — APPOINTMENT (OUTPATIENT)
Dept: NEUROLOGY | Facility: CLINIC | Age: 59
End: 2024-04-03
Payer: MEDICARE

## 2024-04-03 VITALS
SYSTOLIC BLOOD PRESSURE: 125 MMHG | BODY MASS INDEX: 53.05 KG/M2 | OXYGEN SATURATION: 97 % | HEART RATE: 70 BPM | HEIGHT: 61 IN | DIASTOLIC BLOOD PRESSURE: 90 MMHG | WEIGHT: 281 LBS

## 2024-04-03 DIAGNOSIS — G40.109 LOCALIZATION-RELATED (FOCAL) (PARTIAL) SYMPTOMATIC EPILEPSY AND EPILEPTIC SYNDROMES WITH SIMPLE PARTIAL SEIZURES, NOT INTRACTABLE, W/OUT STATUS EPILEPTICUS: ICD-10-CM

## 2024-04-03 DIAGNOSIS — R56.9 UNSPECIFIED CONVULSIONS: ICD-10-CM

## 2024-04-03 PROCEDURE — 99214 OFFICE O/P EST MOD 30 MIN: CPT

## 2024-04-03 NOTE — HISTORY OF PRESENT ILLNESS
[FreeTextEntry1] : *** 04/03/2024  *** Ms. ALBERT reports that she had not had any recurrent seizures since last visit when dose was changed to levetiracetam  q12.  Ms. ALBERT did not have eval by ophthalmology.  She is now c/o droopy right eye lid since a couple of weeks. No other complaints, no levetiracetam side effects. Repeat MRI in Jan show occip-par encephalomalacia - stable lesion.    *** 01/03/2024  *** Ms. ALBERT reports that she gets a brief seizure with "tremor" approximately monthly, duration approximately 4 minutes.  The timing of seizure is random.  She has been driving in daytime - she usually drives with another person in car. Her daughters report that she sometimes moves over excessively to right.  She would like interval MRI, c/o headaches.   *** 06/19/2023  ***  Ms. ALBERT was last seen approximately a year ago.  Since then, she reports no convulsive seizures.  She continues taking levetiracetam 500 mg twice a day.  She reports nearly daily events of right leg jerking lasting 3 to 5 minutes without sensory involvement.  She has no visual involvement either.  She notes that since her surgery, she has poor vision on the right side.  She is driving short distances, and not driving at night.  She endorses that she sometimes has been surprised by objects on her right.  *** 4/15/2022 *** Doing well. No sz. Interested in resuming driving.  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 Levy) who presents for posthospital followup for seizure.  Pt presented to [facility] on 9/8 with new onset BTC seizures x2 within 24hrs in setting of severe DKA (BS >500s). She was initially started on LEV, but quickly tapered off. Continue video EEG off LEV was normal. MRI brain revealed an enhancing lesion in the left temporoparietal lobe that was concerning for malignancy. CT C/A/P negative. Lesion was resected on 9/21 by Dr. Levy, with pathology positive for reactive gliosis. Pt was discharged on LEV 500mg BID for sz ppx. MRI C- and T-spine unremarkable. No further sz. Reports blurry vision in the right visual field that requires multiple eye blinking to regain visual clarity. CURRENT ASM: LEV 500mg BID - started 9/2021.  === 2/14/22 === Completed EMU 11/17 - 11/28/21. Although no IED or sz seen, patient decided to stay on LEV for sz ppx. Doing well. No sz. CURRENT ASM: LEV 500mg BID - started 9/2021.   SEIZURE RISK FACTORS: Hyperglycemia. Left temporoparietal lesion s/p resection 9/2021. Patient was a product of normal pregnancy and delivery. No history of febrile seizure, TBI, CNS infection, or FH of seizures.   PREVIOUS ASM: None  IMAGING:  Multiple MRIs, selected few below.  MRI brain w/wo 10/30/21 ([place]): Normal temporal evolution of previously seen hemorrhage in the left occipital surgical bed. Decreased edema surrounding the surgical cavity. Somewhat thickened peripheral enhancement of the surgical bed. Nodular enhancement along the mesial aspect of the surgical cavity measures 0.8 x 0.8 x 0.9 cm (maximal anterior-posterior by transverse by craniocaudad dimensions). Nodular enhancement along the mesial aspect of the surgical cavity measures 2.2 x 0.8 x 0.9 cm. Nonspecific 1.2 x 0.9 x 0.9 cm focus of enhancement superior to the surgical cavity. While findings may represent postsurgical changes, presence of neoplasm is not excluded.   MRI C- and T-spines w/wo 10/30/21 ([place]): unremarkable   MRI brain w/ contrast 9/15/21 ([facility]): Corresponding to the FLAIR hyperintensity and diffuse diffusion restriction signal in the left temporoparietal region, there is a heterogeneous and peripherally enhancing lesion measuring 2.5 x 1.5 x 2.9 cm in AP, TRV and craniocaudal dimensions. Superoposterior to the lesion and best seen on series 5 image 16 series 3 image 18 there are small ring-enhancing lesions, largest of which measures 4 mm. There is extension of the lesion close to the posterior horn of the left lateral ventricle without extension into the ependyma. No additional lesions are identified in the brain parenchyma. IMPRESSION: There is a peripherally enhancing mass with satellite smaller lesions as described, most likely represents primary versus metastatic disease however, the former is more likely secondary to absence of large vasogenic edema.  MRI brain w/o, epilepsy protocol 9/13/21 ([facility]): Abnormal signal in the left parietotemporal lobe most likely represents an acute infarction. There is no hemorrhagic conversion. There is no midline shift or herniation. Additional differential diagnostic consideration is post seizure focus. Further correlation with MRI of the brain with contrast is suggested to exclude underlying lesion.  NEUROPHYSIOLOGY: EMU 11/17 - 11/28/21 ([facility]): 1) near-cont L PQ slowing; 2) breach L PQ None-elective EMU 9/13 - 9/14/21 ([facility]): normal; off AED cvEEG 9/8 - 9/10/21 ([facility]): improvement from severe to mild-mod gen slowing with propofol taper  NEUROPSYCHOLOGY:  none

## 2024-04-03 NOTE — ASSESSMENT
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 Levy, pathology - reactive gliosis) with residual right homonymous hemianopsia who presents for follow-up for 2 BTC sz's in setting of severe DKA with BS > 500. Suspect 2 BTC sz's in September was provoked by hyperglycemia, but unable to exclude possibility of structural etiology (secondary to left temporoparietal lesion). Discussed with pt risks and benefits of staying on LEV vs taper off, and pt prefers to stay on LEV.  - continue LEV ER 750mg BID - needs VF testing with ophthalmology - if hemianopsia confirmed, may refer to eval by DMV - f/u in 6 months  I have spent 30 minutes or longer reviewing patient data or discussing with the patient the cause of seizures or seizure-like events and comorbid conditions, assessing the risk of recurrence, educating the patient or family to recognize seizures, discussing possible treatment options for seizures and comorbid conditions and documenting encounter and plan. More than 50% of time spent counseling and educating patient about epilepsy including safety issues, AED side effects and interactions, alcohol consumption, sleep deprivation, risks and driving privileges associated with the [facility]. Greater than 50% of the encounter time was spent on counseling and coordination of care for reviewing records in [employer], discussion with patient regarding plan.

## 2024-07-08 ENCOUNTER — RX RENEWAL (OUTPATIENT)
Age: 59
End: 2024-07-08

## 2024-07-08 RX ORDER — LEVETIRACETAM 500 MG/1
500 TABLET, FILM COATED, EXTENDED RELEASE ORAL
Qty: 180 | Refills: 3 | Status: ACTIVE | COMMUNITY
Start: 2024-07-08 | End: 1900-01-01